# Patient Record
Sex: FEMALE | Race: WHITE | Employment: OTHER | URBAN - METROPOLITAN AREA
[De-identification: names, ages, dates, MRNs, and addresses within clinical notes are randomized per-mention and may not be internally consistent; named-entity substitution may affect disease eponyms.]

---

## 2017-01-25 ENCOUNTER — HOSPITAL ENCOUNTER (OUTPATIENT)
Dept: LAB | Facility: HOSPITAL | Age: 72
Discharge: HOME | End: 2017-01-25
Attending: INTERNAL MEDICINE
Payer: MEDICARE

## 2017-01-25 DIAGNOSIS — I48.0 PAROXYSMAL ATRIAL FIBRILLATION (HCC): ICD-10-CM

## 2017-01-25 DIAGNOSIS — I48.92 ATRIAL FLUTTER (HCC): ICD-10-CM

## 2017-01-25 LAB — POC INR: 2.3 (ref 0.8–1.3)

## 2017-01-25 PROCEDURE — 85610 PROTHROMBIN TIME: CPT

## 2017-02-08 ENCOUNTER — PRIOR ORIGINAL RECORDS (OUTPATIENT)
Dept: OTHER | Age: 72
End: 2017-02-08

## 2017-02-08 PROCEDURE — 84439 ASSAY OF FREE THYROXINE: CPT | Performed by: INTERNAL MEDICINE

## 2017-02-08 PROCEDURE — 84443 ASSAY THYROID STIM HORMONE: CPT | Performed by: INTERNAL MEDICINE

## 2017-02-08 PROCEDURE — 36415 COLL VENOUS BLD VENIPUNCTURE: CPT | Performed by: INTERNAL MEDICINE

## 2017-02-22 ENCOUNTER — HOSPITAL ENCOUNTER (OUTPATIENT)
Dept: LAB | Facility: HOSPITAL | Age: 72
Discharge: HOME | End: 2017-02-22
Attending: INTERNAL MEDICINE
Payer: MEDICARE

## 2017-02-22 DIAGNOSIS — I48.0 PAROXYSMAL ATRIAL FIBRILLATION (HCC): ICD-10-CM

## 2017-02-22 DIAGNOSIS — I48.92 ATRIAL FLUTTER (HCC): ICD-10-CM

## 2017-02-22 LAB — POC INR: 2.6 (ref 0.8–1.3)

## 2017-02-22 PROCEDURE — 85610 PROTHROMBIN TIME: CPT

## 2017-02-27 ENCOUNTER — PRIOR ORIGINAL RECORDS (OUTPATIENT)
Dept: OTHER | Age: 72
End: 2017-02-27

## 2017-03-02 LAB
FREE T4: 1.3 MG/DL
THYROID STIMULATING HORMONE: 2.06 MLU/L

## 2017-03-22 ENCOUNTER — PRIOR ORIGINAL RECORDS (OUTPATIENT)
Dept: OTHER | Age: 72
End: 2017-03-22

## 2017-03-22 ENCOUNTER — HOSPITAL ENCOUNTER (OUTPATIENT)
Dept: LAB | Facility: HOSPITAL | Age: 72
Discharge: HOME | End: 2017-03-22
Attending: INTERNAL MEDICINE
Payer: MEDICARE

## 2017-03-22 DIAGNOSIS — I48.0 PAROXYSMAL ATRIAL FIBRILLATION (HCC): ICD-10-CM

## 2017-03-22 DIAGNOSIS — I48.92 ATRIAL FLUTTER (HCC): ICD-10-CM

## 2017-03-22 LAB
ALT SERPL-CCNC: 20 U/L (ref 14–54)
AST SERPL-CCNC: 18 U/L (ref 15–41)
CHOLEST SMN-MCNC: 182 MG/DL (ref ?–200)
HDLC SERPL-MCNC: 90 MG/DL (ref 45–?)
HDLC SERPL: 2.02 {RATIO} (ref ?–4.44)
LDLC SERPL CALC-MCNC: 70 MG/DL (ref ?–130)
NONHDLC SERPL-MCNC: 92 MG/DL (ref ?–130)
POC INR: 2.7 (ref 0.8–1.3)
TRIGLYCERIDES: 108 MG/DL (ref ?–150)
VLDL: 22 MG/DL (ref 5–40)

## 2017-03-22 PROCEDURE — 85610 PROTHROMBIN TIME: CPT

## 2017-03-22 PROCEDURE — 80061 LIPID PANEL: CPT | Performed by: INTERNAL MEDICINE

## 2017-03-22 PROCEDURE — 36415 COLL VENOUS BLD VENIPUNCTURE: CPT | Performed by: INTERNAL MEDICINE

## 2017-03-22 PROCEDURE — 84450 TRANSFERASE (AST) (SGOT): CPT | Performed by: INTERNAL MEDICINE

## 2017-03-22 PROCEDURE — 84460 ALANINE AMINO (ALT) (SGPT): CPT | Performed by: INTERNAL MEDICINE

## 2017-03-28 LAB
ALT (SGPT): 20 U/L
AST (SGOT): 18 U/L
CHOLESTEROL, TOTAL: 182 MG/DL
HDL CHOLESTEROL: 90 MG/DL
LDL CHOLESTEROL: 70 MG/DL
TRIGLYCERIDES: 108 MG/DL

## 2017-04-18 ENCOUNTER — PRIOR ORIGINAL RECORDS (OUTPATIENT)
Dept: OTHER | Age: 72
End: 2017-04-18

## 2017-04-19 ENCOUNTER — HOSPITAL ENCOUNTER (OUTPATIENT)
Dept: LAB | Facility: HOSPITAL | Age: 72
Discharge: HOME | End: 2017-04-19
Attending: INTERNAL MEDICINE
Payer: MEDICARE

## 2017-04-19 DIAGNOSIS — I48.92 ATRIAL FLUTTER (HCC): ICD-10-CM

## 2017-04-19 DIAGNOSIS — I48.0 PAROXYSMAL ATRIAL FIBRILLATION (HCC): ICD-10-CM

## 2017-04-19 PROCEDURE — 85610 PROTHROMBIN TIME: CPT

## 2017-04-26 ENCOUNTER — OFFICE VISIT (OUTPATIENT)
Dept: HEMATOLOGY/ONCOLOGY | Facility: HOSPITAL | Age: 72
End: 2017-04-26
Attending: INTERNAL MEDICINE
Payer: MEDICARE

## 2017-04-26 VITALS
TEMPERATURE: 98 F | SYSTOLIC BLOOD PRESSURE: 104 MMHG | OXYGEN SATURATION: 98 % | HEART RATE: 64 BPM | DIASTOLIC BLOOD PRESSURE: 50 MMHG | BODY MASS INDEX: 26.53 KG/M2 | HEIGHT: 64.02 IN | RESPIRATION RATE: 18 BRPM | WEIGHT: 155.38 LBS

## 2017-04-26 DIAGNOSIS — M85.80 OSTEOPENIA, UNSPECIFIED LOCATION: ICD-10-CM

## 2017-04-26 DIAGNOSIS — C50.912 MALIGNANT NEOPLASM OF LEFT FEMALE BREAST, UNSPECIFIED SITE OF BREAST: Primary | ICD-10-CM

## 2017-04-26 PROCEDURE — 99213 OFFICE O/P EST LOW 20 MIN: CPT | Performed by: INTERNAL MEDICINE

## 2017-04-26 NOTE — PROGRESS NOTES
Pt here for 6 month MD f/u for hx of breast ca. Continues on anastrozole daily and tolerating well. Pt states she is feeling good and denies any complaints at this time.      Education Record    Learner:  Patient    Disease / Diagnosis: breast ca    Shay Sutherland

## 2017-04-26 NOTE — PROGRESS NOTES
[facility] Progress Note      Patient Name:  Maria C Peters  YOB: 1945  Medical Record Number:  [id number]    Date of visit: 4/26/2017    CHIEF COMPLAINT: Stage IIA left breast carcinoma, status post lumpectomy and radiation.     HPI total) by mouth daily. Disp: 3 tablet Rfl: 0   simvastatin (ZOCOR) 40 MG Oral Tab Take 40 mg by mouth nightly. Disp:  Rfl:    Vitamin B-12 (VITAMIN B12) 500 MCG Oral Tab Take 500 mcg by mouth daily.  Disp:  Rfl:    Metoprolol Tartrate (LOPRESSOR) 25 MG Oral GIGI Azevedo  [address], [place], [address]    4/26/2017

## 2017-05-17 ENCOUNTER — HOSPITAL ENCOUNTER (OUTPATIENT)
Dept: LAB | Facility: HOSPITAL | Age: 72
Discharge: HOME | End: 2017-05-17
Attending: INTERNAL MEDICINE
Payer: MEDICARE

## 2017-05-17 DIAGNOSIS — I48.0 PAROXYSMAL ATRIAL FIBRILLATION (HCC): ICD-10-CM

## 2017-05-17 PROCEDURE — 85610 PROTHROMBIN TIME: CPT

## 2017-05-31 ENCOUNTER — SNF/IP PROF CHARGE ONLY (OUTPATIENT)
Dept: HEMATOLOGY/ONCOLOGY | Facility: HOSPITAL | Age: 72
End: 2017-05-31

## 2017-05-31 DIAGNOSIS — C50.912 MALIGNANT NEOPLASM OF LEFT BREAST IN FEMALE, ESTROGEN RECEPTOR POSITIVE, UNSPECIFIED SITE OF BREAST (HCC): ICD-10-CM

## 2017-05-31 DIAGNOSIS — Z17.0 MALIGNANT NEOPLASM OF LEFT BREAST IN FEMALE, ESTROGEN RECEPTOR POSITIVE, UNSPECIFIED SITE OF BREAST (HCC): ICD-10-CM

## 2017-05-31 PROCEDURE — G9678 ONCOLOGY CARE MODEL SERVICE: HCPCS | Performed by: INTERNAL MEDICINE

## 2017-06-05 ENCOUNTER — HOSPITAL ENCOUNTER (EMERGENCY)
Facility: HOSPITAL | Age: 72
Discharge: HOME | End: 2017-06-06
Attending: EMERGENCY MEDICINE
Payer: MEDICARE

## 2017-06-05 DIAGNOSIS — R04.0 EPISTAXIS: Primary | ICD-10-CM

## 2017-06-05 PROCEDURE — 36415 COLL VENOUS BLD VENIPUNCTURE: CPT

## 2017-06-05 PROCEDURE — 85610 PROTHROMBIN TIME: CPT | Performed by: EMERGENCY MEDICINE

## 2017-06-05 PROCEDURE — 99283 EMERGENCY DEPT VISIT LOW MDM: CPT

## 2017-06-05 PROCEDURE — 85025 COMPLETE CBC W/AUTO DIFF WBC: CPT | Performed by: EMERGENCY MEDICINE

## 2017-06-05 PROCEDURE — 80053 COMPREHEN METABOLIC PANEL: CPT | Performed by: EMERGENCY MEDICINE

## 2017-06-06 VITALS
SYSTOLIC BLOOD PRESSURE: 149 MMHG | BODY MASS INDEX: 26.46 KG/M2 | DIASTOLIC BLOOD PRESSURE: 84 MMHG | RESPIRATION RATE: 18 BRPM | OXYGEN SATURATION: 96 % | TEMPERATURE: 99 F | HEART RATE: 75 BPM | WEIGHT: 155 LBS | HEIGHT: 64 IN

## 2017-06-06 NOTE — ED PROVIDER NOTES
Patient Seen in: [facility] Emergency Department    History   Patient presents with:  Nose Bleed (nasopharyngeal)    Stated Complaint: nose bleed    HPI    The patient is a 71-year-old female with a long history of atrial fibrillation who is been in Cap,  Take 1 capsule (5 mg total) by mouth daily.    Warfarin Sodium (COUMADIN) 5 MG Oral Tab,  Takes 2.5 mg on tues, thurs, Saturday and 5 mg mon, wed, fri, sun  Patient taking differently: Takes 2.5 mg on tues, thurs, Saturday and Sunday and 5 mg mon, wed symmetric motor strength and sensation. HEENT: No lymphadenopathy, TMs nml. Oropharynx nml. No blood in the retro-oropharynx. No active bleeding. There is a small amount of dried blood in her right nostril. No source of bleeding.   Left nostril is tariq On arrival the patient a mechanical nasal clip was placed. She was observed in the emergency department for a couple of hours. Blood was obtained and peripheral IV access was established. MDM     She has not had any further rebleeding.   She has been

## 2017-06-14 ENCOUNTER — HOSPITAL ENCOUNTER (OUTPATIENT)
Dept: LAB | Facility: HOSPITAL | Age: 72
Discharge: HOME | End: 2017-06-14
Attending: INTERNAL MEDICINE
Payer: MEDICARE

## 2017-06-14 DIAGNOSIS — I48.0 PAROXYSMAL ATRIAL FIBRILLATION (HCC): ICD-10-CM

## 2017-06-14 PROCEDURE — 85610 PROTHROMBIN TIME: CPT

## 2017-06-30 ENCOUNTER — SNF/IP PROF CHARGE ONLY (OUTPATIENT)
Dept: HEMATOLOGY/ONCOLOGY | Facility: HOSPITAL | Age: 72
End: 2017-06-30

## 2017-06-30 DIAGNOSIS — Z17.0 MALIGNANT NEOPLASM OF LEFT BREAST IN FEMALE, ESTROGEN RECEPTOR POSITIVE, UNSPECIFIED SITE OF BREAST (HCC): ICD-10-CM

## 2017-06-30 DIAGNOSIS — C50.912 MALIGNANT NEOPLASM OF LEFT BREAST IN FEMALE, ESTROGEN RECEPTOR POSITIVE, UNSPECIFIED SITE OF BREAST (HCC): ICD-10-CM

## 2017-06-30 PROCEDURE — G9678 ONCOLOGY CARE MODEL SERVICE: HCPCS | Performed by: INTERNAL MEDICINE

## 2017-07-05 RX ORDER — ANASTROZOLE 1 MG/1
TABLET ORAL
Qty: 90 TABLET | Refills: 2 | OUTPATIENT
Start: 2017-07-05

## 2017-07-06 DIAGNOSIS — C50.919 BREAST CANCER (HCC): Primary | ICD-10-CM

## 2017-07-06 RX ORDER — ANASTROZOLE 1 MG/1
TABLET ORAL
Qty: 90 TABLET | Refills: 2 | OUTPATIENT
Start: 2017-07-06

## 2017-07-07 RX ORDER — ANASTROZOLE 1 MG/1
1 TABLET ORAL
Qty: 90 TABLET | Refills: 3 | Status: SHIPPED | OUTPATIENT
Start: 2017-07-07 | End: 2018-06-18

## 2017-07-18 ENCOUNTER — HOSPITAL ENCOUNTER (OUTPATIENT)
Dept: LAB | Facility: HOSPITAL | Age: 72
Discharge: HOME | End: 2017-07-18
Attending: INTERNAL MEDICINE
Payer: MEDICARE

## 2017-07-18 DIAGNOSIS — I48.0 PAROXYSMAL ATRIAL FIBRILLATION (HCC): ICD-10-CM

## 2017-07-18 LAB — POC INR: 3.5 (ref 0.8–1.3)

## 2017-07-18 PROCEDURE — 85610 PROTHROMBIN TIME: CPT

## 2017-07-31 ENCOUNTER — SNF/IP PROF CHARGE ONLY (OUTPATIENT)
Dept: HEMATOLOGY/ONCOLOGY | Facility: HOSPITAL | Age: 72
End: 2017-07-31

## 2017-07-31 DIAGNOSIS — Z17.0 MALIGNANT NEOPLASM OF LEFT BREAST IN FEMALE, ESTROGEN RECEPTOR POSITIVE, UNSPECIFIED SITE OF BREAST (HCC): ICD-10-CM

## 2017-07-31 DIAGNOSIS — C50.912 MALIGNANT NEOPLASM OF LEFT BREAST IN FEMALE, ESTROGEN RECEPTOR POSITIVE, UNSPECIFIED SITE OF BREAST (HCC): ICD-10-CM

## 2017-07-31 PROCEDURE — G9678 ONCOLOGY CARE MODEL SERVICE: HCPCS | Performed by: INTERNAL MEDICINE

## 2017-08-02 ENCOUNTER — HOSPITAL ENCOUNTER (OUTPATIENT)
Dept: LAB | Facility: HOSPITAL | Age: 72
Discharge: HOME | End: 2017-08-02
Attending: INTERNAL MEDICINE
Payer: MEDICARE

## 2017-08-02 ENCOUNTER — PRIOR ORIGINAL RECORDS (OUTPATIENT)
Dept: OTHER | Age: 72
End: 2017-08-02

## 2017-08-02 DIAGNOSIS — Z51.81 MEDICATION MONITORING ENCOUNTER: ICD-10-CM

## 2017-08-02 DIAGNOSIS — I48.0 PAROXYSMAL ATRIAL FIBRILLATION (HCC): ICD-10-CM

## 2017-08-02 LAB
POC INR: 2.7 (ref 0.8–1.3)
TSI SER-ACNC: 1.58 MIU/ML (ref 0.35–5.5)

## 2017-08-02 PROCEDURE — 85610 PROTHROMBIN TIME: CPT

## 2017-08-02 PROCEDURE — 36415 COLL VENOUS BLD VENIPUNCTURE: CPT

## 2017-08-02 PROCEDURE — 84443 ASSAY THYROID STIM HORMONE: CPT

## 2017-08-16 LAB — THYROID STIMULATING HORMONE: 1.58 MLU/L

## 2017-08-30 ENCOUNTER — HOSPITAL ENCOUNTER (OUTPATIENT)
Dept: LAB | Facility: HOSPITAL | Age: 72
Discharge: HOME | End: 2017-08-30
Attending: INTERNAL MEDICINE
Payer: MEDICARE

## 2017-08-30 DIAGNOSIS — I48.0 PAROXYSMAL ATRIAL FIBRILLATION (HCC): ICD-10-CM

## 2017-08-30 LAB — POC INR: 3.3 (ref 0.8–1.3)

## 2017-08-30 PROCEDURE — 85610 PROTHROMBIN TIME: CPT

## 2017-08-31 ENCOUNTER — SNF/IP PROF CHARGE ONLY (OUTPATIENT)
Dept: HEMATOLOGY/ONCOLOGY | Facility: HOSPITAL | Age: 72
End: 2017-08-31

## 2017-08-31 DIAGNOSIS — Z17.0 MALIGNANT NEOPLASM OF LEFT BREAST IN FEMALE, ESTROGEN RECEPTOR POSITIVE, UNSPECIFIED SITE OF BREAST (HCC): ICD-10-CM

## 2017-08-31 DIAGNOSIS — C50.912 MALIGNANT NEOPLASM OF LEFT BREAST IN FEMALE, ESTROGEN RECEPTOR POSITIVE, UNSPECIFIED SITE OF BREAST (HCC): ICD-10-CM

## 2017-08-31 PROCEDURE — G9678 ONCOLOGY CARE MODEL SERVICE: HCPCS | Performed by: INTERNAL MEDICINE

## 2017-09-12 ENCOUNTER — EMPLOYEE HEALTH (OUTPATIENT)
Dept: OTHER | Facility: HOSPITAL | Age: 72
End: 2017-09-12
Attending: PREVENTIVE MEDICINE

## 2017-09-12 ENCOUNTER — CHARTING TRANS (OUTPATIENT)
Dept: OTHER | Age: 72
End: 2017-09-12

## 2017-09-12 DIAGNOSIS — Z11.1 SCREENING-PULMONARY TB: Primary | ICD-10-CM

## 2017-09-12 PROCEDURE — 86480 TB TEST CELL IMMUN MEASURE: CPT

## 2017-09-14 LAB
M TB TUBERC IFN-G BLD QL: NEGATIVE
M TB TUBERC IFN-G/MITOGEN IGNF BLD: 0 IU/ML
M TB TUBERC IGNF/MITOGEN IGNF CONTROL: >10 IU/ML
MITOGEN IGNF BCKGRD COR BLD-ACNC: 0.04 IU/ML

## 2017-09-20 ENCOUNTER — HOSPITAL ENCOUNTER (OUTPATIENT)
Dept: LAB | Facility: HOSPITAL | Age: 72
Discharge: HOME | End: 2017-09-20
Attending: INTERNAL MEDICINE
Payer: MEDICARE

## 2017-09-20 DIAGNOSIS — I48.0 PAROXYSMAL ATRIAL FIBRILLATION (HCC): ICD-10-CM

## 2017-09-20 LAB — POC INR: 2.8 (ref 0.8–1.3)

## 2017-09-20 PROCEDURE — 85610 PROTHROMBIN TIME: CPT

## 2017-09-30 ENCOUNTER — SNF/IP PROF CHARGE ONLY (OUTPATIENT)
Dept: HEMATOLOGY/ONCOLOGY | Facility: HOSPITAL | Age: 72
End: 2017-09-30

## 2017-09-30 DIAGNOSIS — Z17.0 MALIGNANT NEOPLASM OF LEFT BREAST IN FEMALE, ESTROGEN RECEPTOR POSITIVE, UNSPECIFIED SITE OF BREAST (HCC): ICD-10-CM

## 2017-09-30 DIAGNOSIS — C50.912 MALIGNANT NEOPLASM OF LEFT BREAST IN FEMALE, ESTROGEN RECEPTOR POSITIVE, UNSPECIFIED SITE OF BREAST (HCC): ICD-10-CM

## 2017-09-30 PROCEDURE — G9678 ONCOLOGY CARE MODEL SERVICE: HCPCS | Performed by: INTERNAL MEDICINE

## 2017-10-17 ENCOUNTER — HOSPITAL ENCOUNTER (OUTPATIENT)
Dept: LAB | Facility: HOSPITAL | Age: 72
Discharge: HOME | End: 2017-10-17
Attending: INTERNAL MEDICINE
Payer: MEDICARE

## 2017-10-17 DIAGNOSIS — I48.0 PAROXYSMAL ATRIAL FIBRILLATION (HCC): ICD-10-CM

## 2017-10-17 PROCEDURE — 85610 PROTHROMBIN TIME: CPT

## 2017-10-18 ENCOUNTER — HOSPITAL ENCOUNTER (OUTPATIENT)
Dept: MAMMOGRAPHY | Facility: HOSPITAL | Age: 72
Discharge: HOME | End: 2017-10-18
Attending: INTERNAL MEDICINE
Payer: MEDICARE

## 2017-10-18 DIAGNOSIS — C50.912 MALIGNANT NEOPLASM OF LEFT FEMALE BREAST (HCC): ICD-10-CM

## 2017-10-18 PROCEDURE — 77062 BREAST TOMOSYNTHESIS BI: CPT | Performed by: INTERNAL MEDICINE

## 2017-10-18 PROCEDURE — 77066 DX MAMMO INCL CAD BI: CPT | Performed by: INTERNAL MEDICINE

## 2017-10-25 ENCOUNTER — HOSPITAL ENCOUNTER (OUTPATIENT)
Dept: LAB | Facility: HOSPITAL | Age: 72
Discharge: HOME | End: 2017-10-25
Attending: INTERNAL MEDICINE
Payer: MEDICARE

## 2017-10-25 ENCOUNTER — PRIOR ORIGINAL RECORDS (OUTPATIENT)
Dept: OTHER | Age: 72
End: 2017-10-25

## 2017-10-25 PROCEDURE — 36415 COLL VENOUS BLD VENIPUNCTURE: CPT | Performed by: INTERNAL MEDICINE

## 2017-10-25 PROCEDURE — 84450 TRANSFERASE (AST) (SGOT): CPT | Performed by: INTERNAL MEDICINE

## 2017-10-25 PROCEDURE — 84460 ALANINE AMINO (ALT) (SGPT): CPT | Performed by: INTERNAL MEDICINE

## 2017-10-25 PROCEDURE — 82947 ASSAY GLUCOSE BLOOD QUANT: CPT | Performed by: INTERNAL MEDICINE

## 2017-10-25 PROCEDURE — 80061 LIPID PANEL: CPT | Performed by: INTERNAL MEDICINE

## 2017-10-31 ENCOUNTER — SNF/IP PROF CHARGE ONLY (OUTPATIENT)
Dept: HEMATOLOGY/ONCOLOGY | Facility: HOSPITAL | Age: 72
End: 2017-10-31

## 2017-10-31 DIAGNOSIS — Z17.0 MALIGNANT NEOPLASM OF LEFT BREAST IN FEMALE, ESTROGEN RECEPTOR POSITIVE, UNSPECIFIED SITE OF BREAST (HCC): ICD-10-CM

## 2017-10-31 DIAGNOSIS — C50.912 MALIGNANT NEOPLASM OF LEFT BREAST IN FEMALE, ESTROGEN RECEPTOR POSITIVE, UNSPECIFIED SITE OF BREAST (HCC): ICD-10-CM

## 2017-10-31 PROCEDURE — G9678 ONCOLOGY CARE MODEL SERVICE: HCPCS | Performed by: INTERNAL MEDICINE

## 2017-11-01 ENCOUNTER — OFFICE VISIT (OUTPATIENT)
Dept: HEMATOLOGY/ONCOLOGY | Facility: HOSPITAL | Age: 72
End: 2017-11-01
Attending: INTERNAL MEDICINE
Payer: MEDICARE

## 2017-11-01 VITALS
BODY MASS INDEX: 26.21 KG/M2 | HEIGHT: 64.02 IN | DIASTOLIC BLOOD PRESSURE: 63 MMHG | HEART RATE: 67 BPM | SYSTOLIC BLOOD PRESSURE: 118 MMHG | WEIGHT: 153.5 LBS | RESPIRATION RATE: 16 BRPM | TEMPERATURE: 97 F | OXYGEN SATURATION: 95 %

## 2017-11-01 DIAGNOSIS — M85.80 OSTEOPENIA, UNSPECIFIED LOCATION: ICD-10-CM

## 2017-11-01 DIAGNOSIS — I89.0 LYMPHEDEMA: ICD-10-CM

## 2017-11-01 DIAGNOSIS — Z17.0 MALIGNANT NEOPLASM OF LEFT BREAST IN FEMALE, ESTROGEN RECEPTOR POSITIVE, UNSPECIFIED SITE OF BREAST (HCC): ICD-10-CM

## 2017-11-01 DIAGNOSIS — C50.912 MALIGNANT NEOPLASM OF LEFT BREAST IN FEMALE, ESTROGEN RECEPTOR POSITIVE, UNSPECIFIED SITE OF BREAST (HCC): ICD-10-CM

## 2017-11-01 DIAGNOSIS — M89.9 BONE DISORDER: Primary | ICD-10-CM

## 2017-11-01 PROCEDURE — 99213 OFFICE O/P EST LOW 20 MIN: CPT | Performed by: INTERNAL MEDICINE

## 2017-11-01 NOTE — PROGRESS NOTES
[facility] Progress Note      Patient Name:  Mayi Vance  YOB: 1945  Medical Record Number:  [id number]    Date of visit: 11/1/2017    CHIEF COMPLAINT: Stage IIA left breast carcinoma, status post lumpectomy and radiation.     HPI MG Oral Tab Take 1 tablet (100 mg total) by mouth daily. Disp: 3 tablet Rfl: 0   simvastatin (ZOCOR) 40 MG Oral Tab Take 40 mg by mouth nightly. Disp:  Rfl:    Vitamin B-12 (VITAMIN B12) 500 MCG Oral Tab Take 500 mcg by mouth daily.  Disp:  Rfl:    Metoprol showed osteopenia iwith T score -1.5. Consider repeating in 2 years. # Left breast fullness: tethering noted left breast. Mild lymphedema left ant axillary fold-appears stable. No mass. Watch for now and consider lymphedema Rx if worse.     ORDERS JACI

## 2017-11-01 NOTE — PROGRESS NOTES
Pt here with spouse for 6 month MD f/u for hx of breast ca. Continues on anastrozole daily; denies complaints.      Education Record    Learner:  Patient    Disease / Diagnosis:    Barriers / Limitations:  None   Comments:    Method:  Discussion   Comments:

## 2017-11-10 LAB
ALT (SGPT): 22 U/L
AST (SGOT): 17 U/L
CHOLESTEROL, TOTAL: 168 MG/DL
GLUCOSE: 96 MG/DL
HDL CHOLESTEROL: 91 MG/DL
LDL CHOLESTEROL: 52 MG/DL
TRIGLYCERIDES: 124 MG/DL

## 2017-11-14 ENCOUNTER — PRIOR ORIGINAL RECORDS (OUTPATIENT)
Dept: OTHER | Age: 72
End: 2017-11-14

## 2017-11-14 ENCOUNTER — HOSPITAL ENCOUNTER (OUTPATIENT)
Dept: LAB | Facility: HOSPITAL | Age: 72
Discharge: HOME | End: 2017-11-14
Attending: INTERNAL MEDICINE
Payer: MEDICARE

## 2017-11-14 DIAGNOSIS — I48.0 PAROXYSMAL ATRIAL FIBRILLATION (HCC): ICD-10-CM

## 2017-11-14 PROCEDURE — 85610 PROTHROMBIN TIME: CPT

## 2017-11-24 ENCOUNTER — PRIOR ORIGINAL RECORDS (OUTPATIENT)
Dept: OTHER | Age: 72
End: 2017-11-24

## 2017-12-12 ENCOUNTER — HOSPITAL ENCOUNTER (OUTPATIENT)
Dept: LAB | Facility: HOSPITAL | Age: 72
Discharge: HOME | End: 2017-12-12
Attending: INTERNAL MEDICINE
Payer: MEDICARE

## 2017-12-12 DIAGNOSIS — I48.0 PAROXYSMAL ATRIAL FIBRILLATION (HCC): ICD-10-CM

## 2017-12-12 PROCEDURE — 85610 PROTHROMBIN TIME: CPT

## 2018-01-10 ENCOUNTER — HOSPITAL ENCOUNTER (OUTPATIENT)
Dept: LAB | Facility: HOSPITAL | Age: 73
Discharge: HOME | End: 2018-01-10
Attending: INTERNAL MEDICINE
Payer: MEDICARE

## 2018-01-10 DIAGNOSIS — I48.0 PAROXYSMAL ATRIAL FIBRILLATION (HCC): ICD-10-CM

## 2018-01-10 LAB — POC INR: 2.3 (ref 0.8–1.3)

## 2018-01-10 PROCEDURE — 85610 PROTHROMBIN TIME: CPT

## 2018-02-14 ENCOUNTER — PRIOR ORIGINAL RECORDS (OUTPATIENT)
Dept: OTHER | Age: 73
End: 2018-02-14

## 2018-02-14 ENCOUNTER — HOSPITAL ENCOUNTER (OUTPATIENT)
Dept: LAB | Facility: HOSPITAL | Age: 73
Discharge: HOME | End: 2018-02-14
Attending: INTERNAL MEDICINE
Payer: MEDICARE

## 2018-02-14 DIAGNOSIS — T46.2X5A HYPERTHYROIDISM SECONDARY TO AMIODARONE: ICD-10-CM

## 2018-02-14 DIAGNOSIS — E05.80 HYPERTHYROIDISM SECONDARY TO AMIODARONE: ICD-10-CM

## 2018-02-14 DIAGNOSIS — Z79.899 MEDICATION MANAGEMENT: ICD-10-CM

## 2018-02-14 DIAGNOSIS — I48.0 PAROXYSMAL ATRIAL FIBRILLATION (HCC): ICD-10-CM

## 2018-02-14 LAB
FREE T4: 1.3 NG/DL (ref 0.9–1.8)
POC INR: 2.4 (ref 0.8–1.3)
TSI SER-ACNC: 2.58 MIU/ML (ref 0.35–5.5)

## 2018-02-14 PROCEDURE — 36415 COLL VENOUS BLD VENIPUNCTURE: CPT

## 2018-02-14 PROCEDURE — 84439 ASSAY OF FREE THYROXINE: CPT

## 2018-02-14 PROCEDURE — 84443 ASSAY THYROID STIM HORMONE: CPT

## 2018-02-14 PROCEDURE — 85610 PROTHROMBIN TIME: CPT

## 2018-03-14 ENCOUNTER — HOSPITAL ENCOUNTER (OUTPATIENT)
Dept: LAB | Facility: HOSPITAL | Age: 73
Discharge: HOME | End: 2018-03-14
Attending: INTERNAL MEDICINE
Payer: MEDICARE

## 2018-03-14 DIAGNOSIS — I48.0 PAROXYSMAL ATRIAL FIBRILLATION (HCC): ICD-10-CM

## 2018-03-14 LAB — POC INR: 2.9 (ref 0.8–1.3)

## 2018-03-14 PROCEDURE — 85610 PROTHROMBIN TIME: CPT

## 2018-04-11 ENCOUNTER — HOSPITAL ENCOUNTER (OUTPATIENT)
Dept: LAB | Facility: HOSPITAL | Age: 73
Discharge: HOME | End: 2018-04-11
Attending: INTERNAL MEDICINE
Payer: MEDICARE

## 2018-04-11 ENCOUNTER — TELEPHONE (OUTPATIENT)
Dept: HEMATOLOGY/ONCOLOGY | Facility: HOSPITAL | Age: 73
End: 2018-04-11

## 2018-04-11 DIAGNOSIS — I48.0 PAROXYSMAL ATRIAL FIBRILLATION (HCC): ICD-10-CM

## 2018-04-11 DIAGNOSIS — M89.9 BONE DISORDER: ICD-10-CM

## 2018-04-11 PROCEDURE — 85610 PROTHROMBIN TIME: CPT

## 2018-04-11 PROCEDURE — 36415 COLL VENOUS BLD VENIPUNCTURE: CPT | Performed by: INTERNAL MEDICINE

## 2018-04-11 PROCEDURE — 82306 VITAMIN D 25 HYDROXY: CPT | Performed by: INTERNAL MEDICINE

## 2018-04-11 NOTE — TELEPHONE ENCOUNTER
Bhumi Woodruff MD  [facility] [address] [facility]             Call, Vit D ok, if she is on OTC, may continue      Spoke to  and left message with him.

## 2018-05-02 ENCOUNTER — OFFICE VISIT (OUTPATIENT)
Dept: HEMATOLOGY/ONCOLOGY | Facility: HOSPITAL | Age: 73
End: 2018-05-02
Attending: INTERNAL MEDICINE
Payer: MEDICARE

## 2018-05-02 VITALS
TEMPERATURE: 98 F | HEIGHT: 64.02 IN | WEIGHT: 153.19 LBS | BODY MASS INDEX: 26.15 KG/M2 | HEART RATE: 79 BPM | SYSTOLIC BLOOD PRESSURE: 123 MMHG | RESPIRATION RATE: 18 BRPM | DIASTOLIC BLOOD PRESSURE: 73 MMHG | OXYGEN SATURATION: 97 %

## 2018-05-02 DIAGNOSIS — C50.912 MALIGNANT NEOPLASM OF LEFT BREAST IN FEMALE, ESTROGEN RECEPTOR POSITIVE, UNSPECIFIED SITE OF BREAST (HCC): Primary | ICD-10-CM

## 2018-05-02 DIAGNOSIS — M85.88 OSTEOPENIA OF SPINE: ICD-10-CM

## 2018-05-02 DIAGNOSIS — Z17.0 MALIGNANT NEOPLASM OF LEFT BREAST IN FEMALE, ESTROGEN RECEPTOR POSITIVE, UNSPECIFIED SITE OF BREAST (HCC): Primary | ICD-10-CM

## 2018-05-02 PROCEDURE — 99213 OFFICE O/P EST LOW 20 MIN: CPT | Performed by: INTERNAL MEDICINE

## 2018-05-02 NOTE — PROGRESS NOTES
[facility] Progress Note      Patient Name:  Maria C Peters  YOB: 1945  Medical Record Number:  [id number]    Date of visit: 5/2/2018    CHIEF COMPLAINT: Stage IIA left breast carcinoma, status post lumpectomy and radiation.     HPI: 50 tablet Rfl: 11   Amiodarone HCl (PACERONE) 100 MG Oral Tab Take 1 tablet (100 mg total) by mouth daily. Disp: 3 tablet Rfl: 0   simvastatin (ZOCOR) 40 MG Oral Tab Take 40 mg by mouth nightly.  Disp:  Rfl:    Vitamin B-12 (VITAMIN B12) 500 MCG Oral Tab Ta positive lymph node. Discussed small benefit of extending endocrine Rx and she wishes to remain on it at least till 10/18 and will consider extending if DEXA is stable. # Osteopenia:  DEXA 10/16 showed osteopenia iwith T score -1.5. Repeat this fall.

## 2018-05-02 NOTE — PROGRESS NOTES
Pt here for 6 month MD f/u for h/o LB ca. Continues on anastrozole daily without complaint. Pt states she has been feeling well and denies any issues or concerns at this time.      Education Record    Learner:  Patient, spouse    Disease / Diagnosis:    Bar

## 2018-05-09 ENCOUNTER — HOSPITAL ENCOUNTER (OUTPATIENT)
Dept: LAB | Facility: HOSPITAL | Age: 73
Discharge: HOME | End: 2018-05-09
Attending: INTERNAL MEDICINE
Payer: MEDICARE

## 2018-05-09 ENCOUNTER — PRIOR ORIGINAL RECORDS (OUTPATIENT)
Dept: OTHER | Age: 73
End: 2018-05-09

## 2018-05-09 DIAGNOSIS — I48.0 PAROXYSMAL ATRIAL FIBRILLATION (HCC): ICD-10-CM

## 2018-05-09 PROCEDURE — 84450 TRANSFERASE (AST) (SGOT): CPT | Performed by: INTERNAL MEDICINE

## 2018-05-09 PROCEDURE — 80061 LIPID PANEL: CPT | Performed by: INTERNAL MEDICINE

## 2018-05-09 PROCEDURE — 85610 PROTHROMBIN TIME: CPT

## 2018-05-09 PROCEDURE — 84460 ALANINE AMINO (ALT) (SGPT): CPT | Performed by: INTERNAL MEDICINE

## 2018-05-09 PROCEDURE — 82947 ASSAY GLUCOSE BLOOD QUANT: CPT | Performed by: INTERNAL MEDICINE

## 2018-05-09 PROCEDURE — 36415 COLL VENOUS BLD VENIPUNCTURE: CPT | Performed by: INTERNAL MEDICINE

## 2018-05-17 LAB
ALT (SGPT): 20 U/L
AST (SGOT): 17 U/L
CHOLESTEROL, TOTAL: 166 MG/DL
GLUCOSE: 100 MG/DL
HDL CHOLESTEROL: 88 MG/DL
LDL CHOLESTEROL: 55 MG/DL
TRIGLYCERIDES: 113 MG/DL

## 2018-06-05 ENCOUNTER — PRIOR ORIGINAL RECORDS (OUTPATIENT)
Dept: OTHER | Age: 73
End: 2018-06-05

## 2018-06-06 ENCOUNTER — HOSPITAL ENCOUNTER (OUTPATIENT)
Dept: LAB | Facility: HOSPITAL | Age: 73
Discharge: HOME | End: 2018-06-06
Attending: INTERNAL MEDICINE
Payer: MEDICARE

## 2018-06-06 DIAGNOSIS — I48.92 ATRIAL FLUTTER (HCC): ICD-10-CM

## 2018-06-06 DIAGNOSIS — I48.0 PAROXYSMAL ATRIAL FIBRILLATION (HCC): ICD-10-CM

## 2018-06-06 PROCEDURE — 85610 PROTHROMBIN TIME: CPT

## 2018-06-15 ENCOUNTER — HOSPITAL ENCOUNTER (OUTPATIENT)
Dept: LAB | Facility: HOSPITAL | Age: 73
Discharge: HOME | End: 2018-06-15
Attending: INTERNAL MEDICINE
Payer: MEDICARE

## 2018-06-15 DIAGNOSIS — I48.92 ATRIAL FLUTTER (HCC): ICD-10-CM

## 2018-06-15 DIAGNOSIS — I48.0 PAROXYSMAL ATRIAL FIBRILLATION (HCC): ICD-10-CM

## 2018-06-15 PROCEDURE — 85610 PROTHROMBIN TIME: CPT

## 2018-06-18 DIAGNOSIS — C50.919 BREAST CANCER (HCC): ICD-10-CM

## 2018-06-19 RX ORDER — ANASTROZOLE 1 MG/1
TABLET ORAL
Qty: 90 TABLET | Refills: 2 | Status: SHIPPED | OUTPATIENT
Start: 2018-06-19 | End: 2019-03-05

## 2018-06-20 ENCOUNTER — HOSPITAL ENCOUNTER (OUTPATIENT)
Dept: LAB | Facility: HOSPITAL | Age: 73
Discharge: HOME | End: 2018-06-20
Attending: INTERNAL MEDICINE
Payer: MEDICARE

## 2018-06-20 DIAGNOSIS — I48.92 ATRIAL FLUTTER (HCC): ICD-10-CM

## 2018-06-20 DIAGNOSIS — I48.0 PAROXYSMAL ATRIAL FIBRILLATION (HCC): ICD-10-CM

## 2018-06-20 PROCEDURE — 85610 PROTHROMBIN TIME: CPT

## 2018-06-25 ENCOUNTER — HOSPITAL ENCOUNTER (OUTPATIENT)
Dept: LAB | Facility: HOSPITAL | Age: 73
Discharge: HOME | End: 2018-06-25
Attending: INTERNAL MEDICINE
Payer: MEDICARE

## 2018-06-25 DIAGNOSIS — I48.92 ATRIAL FLUTTER (HCC): ICD-10-CM

## 2018-06-25 DIAGNOSIS — I48.0 PAROXYSMAL ATRIAL FIBRILLATION (HCC): ICD-10-CM

## 2018-06-25 PROCEDURE — 85610 PROTHROMBIN TIME: CPT

## 2018-06-30 ENCOUNTER — SNF/IP PROF CHARGE ONLY (OUTPATIENT)
Dept: HEMATOLOGY/ONCOLOGY | Facility: HOSPITAL | Age: 73
End: 2018-06-30

## 2018-06-30 DIAGNOSIS — C50.912 MALIGNANT NEOPLASM OF LEFT BREAST IN FEMALE, ESTROGEN RECEPTOR POSITIVE, UNSPECIFIED SITE OF BREAST (HCC): ICD-10-CM

## 2018-06-30 DIAGNOSIS — Z17.0 MALIGNANT NEOPLASM OF LEFT BREAST IN FEMALE, ESTROGEN RECEPTOR POSITIVE, UNSPECIFIED SITE OF BREAST (HCC): ICD-10-CM

## 2018-06-30 PROCEDURE — G9678 ONCOLOGY CARE MODEL SERVICE: HCPCS | Performed by: INTERNAL MEDICINE

## 2018-07-05 ENCOUNTER — HOSPITAL ENCOUNTER (OUTPATIENT)
Dept: LAB | Facility: HOSPITAL | Age: 73
Discharge: HOME | End: 2018-07-05
Attending: INTERNAL MEDICINE
Payer: MEDICARE

## 2018-07-05 DIAGNOSIS — I48.92 ATRIAL FLUTTER (HCC): ICD-10-CM

## 2018-07-05 DIAGNOSIS — I48.0 PAROXYSMAL ATRIAL FIBRILLATION (HCC): ICD-10-CM

## 2018-07-05 LAB — POC INR: 2.4 (ref 0.8–1.3)

## 2018-07-05 PROCEDURE — 85610 PROTHROMBIN TIME: CPT

## 2018-07-17 ENCOUNTER — PRIOR ORIGINAL RECORDS (OUTPATIENT)
Dept: OTHER | Age: 73
End: 2018-07-17

## 2018-07-20 ENCOUNTER — HOSPITAL ENCOUNTER (OUTPATIENT)
Dept: LAB | Facility: HOSPITAL | Age: 73
Discharge: HOME | End: 2018-07-20
Attending: INTERNAL MEDICINE
Payer: MEDICARE

## 2018-07-20 DIAGNOSIS — I48.0 PAROXYSMAL ATRIAL FIBRILLATION (HCC): ICD-10-CM

## 2018-07-20 DIAGNOSIS — I48.92 ATRIAL FLUTTER (HCC): ICD-10-CM

## 2018-07-20 LAB — POC INR: 2.7 (ref 0.8–1.3)

## 2018-07-20 PROCEDURE — 85610 PROTHROMBIN TIME: CPT

## 2018-07-27 LAB
FREE T4: 1.3 MG/DL
THYROID STIMULATING HORMONE: 2.58 MLU/L

## 2018-07-31 ENCOUNTER — SNF/IP PROF CHARGE ONLY (OUTPATIENT)
Dept: HEMATOLOGY/ONCOLOGY | Facility: HOSPITAL | Age: 73
End: 2018-07-31

## 2018-07-31 DIAGNOSIS — Z17.0 MALIGNANT NEOPLASM OF LEFT BREAST IN FEMALE, ESTROGEN RECEPTOR POSITIVE, UNSPECIFIED SITE OF BREAST (HCC): ICD-10-CM

## 2018-07-31 DIAGNOSIS — C50.912 MALIGNANT NEOPLASM OF LEFT BREAST IN FEMALE, ESTROGEN RECEPTOR POSITIVE, UNSPECIFIED SITE OF BREAST (HCC): ICD-10-CM

## 2018-07-31 PROCEDURE — G9678 ONCOLOGY CARE MODEL SERVICE: HCPCS | Performed by: INTERNAL MEDICINE

## 2018-08-15 ENCOUNTER — APPOINTMENT (OUTPATIENT)
Dept: LAB | Facility: HOSPITAL | Age: 73
End: 2018-08-15
Attending: INTERNAL MEDICINE
Payer: MEDICARE

## 2018-08-15 ENCOUNTER — HOSPITAL ENCOUNTER (OUTPATIENT)
Dept: LAB | Facility: HOSPITAL | Age: 73
Discharge: HOME | End: 2018-08-15
Attending: INTERNAL MEDICINE
Payer: MEDICARE

## 2018-08-15 DIAGNOSIS — E05.80 HYPERTHYROIDISM SECONDARY TO AMIODARONE: ICD-10-CM

## 2018-08-15 DIAGNOSIS — Z79.899 MEDICATION MANAGEMENT: ICD-10-CM

## 2018-08-15 DIAGNOSIS — T46.2X5A HYPERTHYROIDISM SECONDARY TO AMIODARONE: ICD-10-CM

## 2018-08-15 LAB
INR BLD: 2.78 (ref 0.9–1.1)
PSA SERPL DL<=0.01 NG/ML-MCNC: 30.2 SECONDS (ref 12.4–14.7)
T4 FREE SERPL-MCNC: 1.3 NG/DL (ref 0.9–1.8)
TSI SER-ACNC: 1.74 MIU/ML (ref 0.35–5.5)

## 2018-08-15 PROCEDURE — 36415 COLL VENOUS BLD VENIPUNCTURE: CPT

## 2018-08-15 PROCEDURE — 84443 ASSAY THYROID STIM HORMONE: CPT

## 2018-08-15 PROCEDURE — 84439 ASSAY OF FREE THYROXINE: CPT

## 2018-08-15 PROCEDURE — 85610 PROTHROMBIN TIME: CPT | Performed by: INTERNAL MEDICINE

## 2018-08-31 ENCOUNTER — SNF/IP PROF CHARGE ONLY (OUTPATIENT)
Dept: HEMATOLOGY/ONCOLOGY | Facility: HOSPITAL | Age: 73
End: 2018-08-31

## 2018-08-31 DIAGNOSIS — Z17.0 MALIGNANT NEOPLASM OF LEFT BREAST IN FEMALE, ESTROGEN RECEPTOR POSITIVE, UNSPECIFIED SITE OF BREAST (HCC): ICD-10-CM

## 2018-08-31 DIAGNOSIS — C50.912 MALIGNANT NEOPLASM OF LEFT BREAST IN FEMALE, ESTROGEN RECEPTOR POSITIVE, UNSPECIFIED SITE OF BREAST (HCC): ICD-10-CM

## 2018-08-31 PROCEDURE — G9678 ONCOLOGY CARE MODEL SERVICE: HCPCS | Performed by: INTERNAL MEDICINE

## 2018-09-12 ENCOUNTER — PRIOR ORIGINAL RECORDS (OUTPATIENT)
Dept: OTHER | Age: 73
End: 2018-09-12

## 2018-09-12 ENCOUNTER — HOSPITAL ENCOUNTER (OUTPATIENT)
Dept: LAB | Facility: HOSPITAL | Age: 73
Discharge: HOME | End: 2018-09-12
Attending: INTERNAL MEDICINE
Payer: MEDICARE

## 2018-09-12 LAB
INR BLD: 2.46 (ref 0.9–1.1)
INR: 2.46
PSA SERPL DL<=0.01 NG/ML-MCNC: 27.5 SECONDS (ref 12.4–14.7)

## 2018-09-12 PROCEDURE — 36415 COLL VENOUS BLD VENIPUNCTURE: CPT | Performed by: INTERNAL MEDICINE

## 2018-09-12 PROCEDURE — 85610 PROTHROMBIN TIME: CPT | Performed by: INTERNAL MEDICINE

## 2018-09-30 ENCOUNTER — SNF/IP PROF CHARGE ONLY (OUTPATIENT)
Dept: HEMATOLOGY/ONCOLOGY | Facility: HOSPITAL | Age: 73
End: 2018-09-30

## 2018-09-30 DIAGNOSIS — Z17.0 MALIGNANT NEOPLASM OF LEFT BREAST IN FEMALE, ESTROGEN RECEPTOR POSITIVE, UNSPECIFIED SITE OF BREAST (HCC): ICD-10-CM

## 2018-09-30 DIAGNOSIS — C50.912 MALIGNANT NEOPLASM OF LEFT BREAST IN FEMALE, ESTROGEN RECEPTOR POSITIVE, UNSPECIFIED SITE OF BREAST (HCC): ICD-10-CM

## 2018-09-30 PROCEDURE — G9678 ONCOLOGY CARE MODEL SERVICE: HCPCS | Performed by: INTERNAL MEDICINE

## 2018-10-10 ENCOUNTER — PRIOR ORIGINAL RECORDS (OUTPATIENT)
Dept: OTHER | Age: 73
End: 2018-10-10

## 2018-10-10 ENCOUNTER — HOSPITAL ENCOUNTER (OUTPATIENT)
Dept: LAB | Facility: HOSPITAL | Age: 73
Discharge: HOME | End: 2018-10-10
Attending: INTERNAL MEDICINE
Payer: MEDICARE

## 2018-10-10 LAB — INR: 2.4

## 2018-10-10 PROCEDURE — 85610 PROTHROMBIN TIME: CPT | Performed by: INTERNAL MEDICINE

## 2018-10-10 PROCEDURE — 36415 COLL VENOUS BLD VENIPUNCTURE: CPT | Performed by: INTERNAL MEDICINE

## 2018-10-24 ENCOUNTER — HOSPITAL ENCOUNTER (OUTPATIENT)
Dept: MAMMOGRAPHY | Facility: HOSPITAL | Age: 73
Discharge: HOME | End: 2018-10-24
Attending: INTERNAL MEDICINE
Payer: MEDICARE

## 2018-10-24 ENCOUNTER — HOSPITAL ENCOUNTER (OUTPATIENT)
Dept: BONE DENSITY | Age: 73
Discharge: HOME | End: 2018-10-24
Attending: INTERNAL MEDICINE
Payer: MEDICARE

## 2018-10-24 DIAGNOSIS — C50.912 MALIGNANT NEOPLASM OF LEFT BREAST IN FEMALE, ESTROGEN RECEPTOR POSITIVE, UNSPECIFIED SITE OF BREAST (HCC): ICD-10-CM

## 2018-10-24 DIAGNOSIS — Z17.0 MALIGNANT NEOPLASM OF LEFT BREAST IN FEMALE, ESTROGEN RECEPTOR POSITIVE, UNSPECIFIED SITE OF BREAST (HCC): ICD-10-CM

## 2018-10-24 DIAGNOSIS — M85.88 OSTEOPENIA OF SPINE: ICD-10-CM

## 2018-10-24 PROCEDURE — 77080 DXA BONE DENSITY AXIAL: CPT | Performed by: INTERNAL MEDICINE

## 2018-10-24 PROCEDURE — 77062 BREAST TOMOSYNTHESIS BI: CPT | Performed by: INTERNAL MEDICINE

## 2018-10-24 PROCEDURE — 76642 ULTRASOUND BREAST LIMITED: CPT | Performed by: INTERNAL MEDICINE

## 2018-10-24 PROCEDURE — 77066 DX MAMMO INCL CAD BI: CPT | Performed by: INTERNAL MEDICINE

## 2018-10-25 ENCOUNTER — TELEPHONE (OUTPATIENT)
Dept: HEMATOLOGY/ONCOLOGY | Facility: HOSPITAL | Age: 73
End: 2018-10-25

## 2018-10-25 NOTE — TELEPHONE ENCOUNTER
Renetta Mata MD  [facility] [address] [facility]             Call, mammo ok      Pt's spouse aware of results and verbalized understanding.

## 2018-10-31 ENCOUNTER — SNF/IP PROF CHARGE ONLY (OUTPATIENT)
Dept: HEMATOLOGY/ONCOLOGY | Facility: HOSPITAL | Age: 73
End: 2018-10-31

## 2018-10-31 DIAGNOSIS — C50.912 MALIGNANT NEOPLASM OF LEFT BREAST IN FEMALE, ESTROGEN RECEPTOR POSITIVE, UNSPECIFIED SITE OF BREAST (HCC): ICD-10-CM

## 2018-10-31 DIAGNOSIS — Z17.0 MALIGNANT NEOPLASM OF LEFT BREAST IN FEMALE, ESTROGEN RECEPTOR POSITIVE, UNSPECIFIED SITE OF BREAST (HCC): ICD-10-CM

## 2018-10-31 PROCEDURE — G9678 ONCOLOGY CARE MODEL SERVICE: HCPCS | Performed by: INTERNAL MEDICINE

## 2018-11-05 ENCOUNTER — PRIOR ORIGINAL RECORDS (OUTPATIENT)
Dept: OTHER | Age: 73
End: 2018-11-05

## 2018-11-05 ENCOUNTER — HOSPITAL ENCOUNTER (OUTPATIENT)
Dept: LAB | Facility: HOSPITAL | Age: 73
Discharge: HOME | End: 2018-11-05
Attending: INTERNAL MEDICINE
Payer: MEDICARE

## 2018-11-05 LAB — INR: 2.47

## 2018-11-05 PROCEDURE — 85610 PROTHROMBIN TIME: CPT | Performed by: INTERNAL MEDICINE

## 2018-11-05 PROCEDURE — 36415 COLL VENOUS BLD VENIPUNCTURE: CPT | Performed by: INTERNAL MEDICINE

## 2018-11-07 ENCOUNTER — OFFICE VISIT (OUTPATIENT)
Dept: HEMATOLOGY/ONCOLOGY | Facility: HOSPITAL | Age: 73
End: 2018-11-07
Attending: INTERNAL MEDICINE
Payer: MEDICARE

## 2018-11-07 VITALS
OXYGEN SATURATION: 94 % | TEMPERATURE: 97 F | HEART RATE: 60 BPM | SYSTOLIC BLOOD PRESSURE: 144 MMHG | WEIGHT: 151.81 LBS | HEIGHT: 64.02 IN | BODY MASS INDEX: 25.92 KG/M2 | DIASTOLIC BLOOD PRESSURE: 77 MMHG | RESPIRATION RATE: 18 BRPM

## 2018-11-07 DIAGNOSIS — M85.88 OSTEOPENIA OF SPINE: ICD-10-CM

## 2018-11-07 DIAGNOSIS — C50.912 MALIGNANT NEOPLASM OF LEFT BREAST IN FEMALE, ESTROGEN RECEPTOR POSITIVE, UNSPECIFIED SITE OF BREAST (HCC): Primary | ICD-10-CM

## 2018-11-07 DIAGNOSIS — Z17.0 MALIGNANT NEOPLASM OF LEFT BREAST IN FEMALE, ESTROGEN RECEPTOR POSITIVE, UNSPECIFIED SITE OF BREAST (HCC): Primary | ICD-10-CM

## 2018-11-07 PROCEDURE — 99213 OFFICE O/P EST LOW 20 MIN: CPT | Performed by: INTERNAL MEDICINE

## 2018-11-07 NOTE — PROGRESS NOTES
[facility] Progress Note      Patient Name:  Bob Fox  YOB: 1945  Medical Record Number:  [id number]    Date of visit: 11/7/2018    CHIEF COMPLAINT: Stage IIA left breast carcinoma, status post lumpectomy and radiation.     HPI week. Hold x1 week. Repeat PRN with flares. Disp: 15 g Rfl: 3   ramipril (ALTACE) 5 MG Oral Cap Take 1 capsule (5 mg total) by mouth daily.  Disp: 30 capsule Rfl: 11   Warfarin Sodium (COUMADIN) 5 MG Oral Tab Takes 2.5 mg on tues, thurs, Saturday and 5 mg m psychosocial intervention were identified. ASSESSMENT AND PLAN:     # Stage II left breast carcinoma, status post lumpectomy (T1N1MX). She continues to tolerate anastrozole well. She completes 5 years of endocrine Rx in 3/18.   Only risk factor is posit

## 2018-11-07 NOTE — PROGRESS NOTES
Pt here for MD f/u for h/o breast ca. Continues on letrozole daily. Reports that the last 2 months she has been experiencing right shoulder pain and unable to lift past shoulder level; denies any trauma or injury to area.      Outpatient Oncology Care Plan

## 2018-11-30 ENCOUNTER — SNF/IP PROF CHARGE ONLY (OUTPATIENT)
Dept: HEMATOLOGY/ONCOLOGY | Facility: HOSPITAL | Age: 73
End: 2018-11-30

## 2018-11-30 DIAGNOSIS — Z17.0 MALIGNANT NEOPLASM OF LEFT BREAST IN FEMALE, ESTROGEN RECEPTOR POSITIVE, UNSPECIFIED SITE OF BREAST (HCC): ICD-10-CM

## 2018-11-30 DIAGNOSIS — C50.912 MALIGNANT NEOPLASM OF LEFT BREAST IN FEMALE, ESTROGEN RECEPTOR POSITIVE, UNSPECIFIED SITE OF BREAST (HCC): ICD-10-CM

## 2018-11-30 PROCEDURE — G9678 ONCOLOGY CARE MODEL SERVICE: HCPCS | Performed by: INTERNAL MEDICINE

## 2018-12-05 ENCOUNTER — HOSPITAL ENCOUNTER (OUTPATIENT)
Dept: LAB | Facility: HOSPITAL | Age: 73
Discharge: HOME | End: 2018-12-05
Attending: INTERNAL MEDICINE
Payer: MEDICARE

## 2018-12-05 ENCOUNTER — PRIOR ORIGINAL RECORDS (OUTPATIENT)
Dept: OTHER | Age: 73
End: 2018-12-05

## 2018-12-05 DIAGNOSIS — I48.92 ATRIAL FLUTTER (HCC): ICD-10-CM

## 2018-12-05 DIAGNOSIS — I48.0 PAROXYSMAL ATRIAL FIBRILLATION (HCC): ICD-10-CM

## 2018-12-05 LAB — INR: 3.4

## 2018-12-05 PROCEDURE — 85610 PROTHROMBIN TIME: CPT

## 2018-12-19 ENCOUNTER — HOSPITAL ENCOUNTER (OUTPATIENT)
Dept: LAB | Facility: HOSPITAL | Age: 73
Discharge: HOME | End: 2018-12-19
Attending: INTERNAL MEDICINE
Payer: MEDICARE

## 2018-12-19 ENCOUNTER — PRIOR ORIGINAL RECORDS (OUTPATIENT)
Dept: OTHER | Age: 73
End: 2018-12-19

## 2018-12-19 DIAGNOSIS — I48.0 PAROXYSMAL ATRIAL FIBRILLATION (HCC): ICD-10-CM

## 2018-12-19 DIAGNOSIS — I48.92 ATRIAL FLUTTER (HCC): ICD-10-CM

## 2018-12-19 LAB — INR: 3

## 2018-12-19 PROCEDURE — 85610 PROTHROMBIN TIME: CPT

## 2019-01-02 ENCOUNTER — HOSPITAL ENCOUNTER (OUTPATIENT)
Dept: LAB | Facility: HOSPITAL | Age: 74
Discharge: HOME | End: 2019-01-02
Attending: INTERNAL MEDICINE
Payer: MEDICARE

## 2019-01-02 ENCOUNTER — PRIOR ORIGINAL RECORDS (OUTPATIENT)
Dept: OTHER | Age: 74
End: 2019-01-02

## 2019-01-02 DIAGNOSIS — I48.92 ATRIAL FLUTTER (HCC): ICD-10-CM

## 2019-01-02 DIAGNOSIS — I48.0 PAROXYSMAL ATRIAL FIBRILLATION (HCC): ICD-10-CM

## 2019-01-02 LAB
INR: 2.3
POC INR: 2.3 (ref 0.8–1.3)

## 2019-01-02 PROCEDURE — 85610 PROTHROMBIN TIME: CPT

## 2019-01-16 ENCOUNTER — PRIOR ORIGINAL RECORDS (OUTPATIENT)
Dept: OTHER | Age: 74
End: 2019-01-16

## 2019-01-16 ENCOUNTER — HOSPITAL ENCOUNTER (OUTPATIENT)
Dept: LAB | Facility: HOSPITAL | Age: 74
Discharge: HOME | End: 2019-01-16
Attending: INTERNAL MEDICINE
Payer: MEDICARE

## 2019-01-16 DIAGNOSIS — I48.92 ATRIAL FLUTTER (HCC): ICD-10-CM

## 2019-01-16 DIAGNOSIS — I48.0 PAROXYSMAL ATRIAL FIBRILLATION (HCC): ICD-10-CM

## 2019-01-16 LAB
ALBUMIN SERPL-MCNC: 3.6 G/DL (ref 3.1–4.5)
ALBUMIN/GLOB SERPL: 0.9 {RATIO} (ref 1–2)
ALP LIVER SERPL-CCNC: 81 U/L (ref 55–142)
ALT SERPL-CCNC: 20 U/L (ref 14–54)
ANION GAP SERPL CALC-SCNC: 6 MMOL/L (ref 0–18)
AST SERPL-CCNC: 15 U/L (ref 15–41)
BASOPHILS # BLD AUTO: 0.02 X10(3) UL (ref 0–0.1)
BASOPHILS NFR BLD AUTO: 0.6 %
BILIRUB SERPL-MCNC: 0.4 MG/DL (ref 0.1–2)
BUN BLD-MCNC: 21 MG/DL (ref 8–20)
BUN/CREAT SERPL: 23.3 (ref 10–20)
CALCIUM BLD-MCNC: 8.9 MG/DL (ref 8.3–10.3)
CHLORIDE SERPL-SCNC: 110 MMOL/L (ref 101–111)
CHOLEST SMN-MCNC: 172 MG/DL (ref ?–200)
CO2 SERPL-SCNC: 25 MMOL/L (ref 22–32)
CREAT BLD-MCNC: 0.9 MG/DL (ref 0.55–1.02)
EOSINOPHIL # BLD AUTO: 0.02 X10(3) UL (ref 0–0.3)
EOSINOPHIL NFR BLD AUTO: 0.6 %
ERYTHROCYTE [DISTWIDTH] IN BLOOD BY AUTOMATED COUNT: 12.8 % (ref 11.5–16)
GLOBULIN PLAS-MCNC: 3.9 G/DL (ref 2.8–4.4)
GLUCOSE BLD-MCNC: 94 MG/DL (ref 70–99)
HCT VFR BLD AUTO: 40.1 % (ref 34–50)
HDLC SERPL-MCNC: 88 MG/DL (ref 40–59)
HGB BLD-MCNC: 13 G/DL (ref 12–16)
IMMATURE GRANULOCYTE COUNT: 0.01 X10(3) UL (ref 0–1)
IMMATURE GRANULOCYTE RATIO %: 0.3 %
INR: 2.5
LDLC SERPL CALC-MCNC: 63 MG/DL (ref ?–100)
LYMPHOCYTES # BLD AUTO: 1.61 X10(3) UL (ref 0.9–4)
LYMPHOCYTES NFR BLD AUTO: 46.1 %
M PROTEIN MFR SERPL ELPH: 7.5 G/DL (ref 6.4–8.2)
MCH RBC QN AUTO: 29 PG (ref 27–33.2)
MCHC RBC AUTO-ENTMCNC: 32.4 G/DL (ref 31–37)
MCV RBC AUTO: 89.3 FL (ref 81–100)
MONOCYTES # BLD AUTO: 0.65 X10(3) UL (ref 0.1–1)
MONOCYTES NFR BLD AUTO: 18.6 %
NEUTROPHIL ABS PRELIM: 1.18 X10 (3) UL (ref 1.3–6.7)
NEUTROPHILS # BLD AUTO: 1.18 X10(3) UL (ref 1.3–6.7)
NEUTROPHILS NFR BLD AUTO: 33.8 %
NONHDLC SERPL-MCNC: 84 MG/DL (ref ?–130)
OSMOLALITY SERPL CALC.SUM OF ELEC: 295 MOSM/KG (ref 275–295)
PLATELET # BLD AUTO: 172 10(3)UL (ref 150–450)
POC INR: 2.5 (ref 0.8–1.3)
POTASSIUM SERPL-SCNC: 4.3 MMOL/L (ref 3.6–5.1)
RBC # BLD AUTO: 4.49 X10(6)UL (ref 3.8–5.1)
RED CELL DISTRIBUTION WIDTH-SD: 42.4 FL (ref 35.1–46.3)
SODIUM SERPL-SCNC: 141 MMOL/L (ref 136–144)
TRIGL SERPL-MCNC: 106 MG/DL (ref 30–149)
VLDLC SERPL CALC-MCNC: 21 MG/DL (ref 0–30)
WBC # BLD AUTO: 3.5 X10(3) UL (ref 4–13)

## 2019-01-16 PROCEDURE — 80061 LIPID PANEL: CPT | Performed by: INTERNAL MEDICINE

## 2019-01-16 PROCEDURE — 36415 COLL VENOUS BLD VENIPUNCTURE: CPT | Performed by: INTERNAL MEDICINE

## 2019-01-16 PROCEDURE — 80053 COMPREHEN METABOLIC PANEL: CPT | Performed by: INTERNAL MEDICINE

## 2019-01-16 PROCEDURE — 85610 PROTHROMBIN TIME: CPT

## 2019-01-16 PROCEDURE — 85025 COMPLETE CBC W/AUTO DIFF WBC: CPT | Performed by: INTERNAL MEDICINE

## 2019-02-05 ENCOUNTER — PRIOR ORIGINAL RECORDS (OUTPATIENT)
Dept: OTHER | Age: 74
End: 2019-02-05

## 2019-02-05 ENCOUNTER — MYAURORA ACCOUNT LINK (OUTPATIENT)
Dept: OTHER | Age: 74
End: 2019-02-05

## 2019-02-06 LAB
ALBUMIN: 3.6 G/DL
ALKALINE PHOSPHATATE(ALK PHOS): 81 IU/L
BILIRUBIN TOTAL: 0.4 MG/DL
BUN: 21 MG/DL
CALCIUM: 8.9 MG/DL
CHLORIDE: 110 MEQ/L
CHOLESTEROL, TOTAL: 172 MG/DL
CREATININE, SERUM: 0.9 MG/DL
GLOBULIN: 3.9 G/DL
GLUCOSE: 94 MG/DL
HDL CHOLESTEROL: 88 MG/DL
HEMATOCRIT: 40.1 %
HEMOGLOBIN: 13 G/DL
LDL CHOLESTEROL: 63 MG/DL
PLATELETS: 172 K/UL
POTASSIUM, SERUM: 4.3 MEQ/L
PROTEIN, TOTAL: 7.5 G/DL
RED BLOOD COUNT: 4.49 X 10-6/U
SGOT (AST): 15 IU/L
SGPT (ALT): 20 IU/L
SODIUM: 141 MEQ/L
TRIGLYCERIDES: 106 MG/DL
WHITE BLOOD COUNT: 3.5 X 10-3/U

## 2019-02-13 ENCOUNTER — PRIOR ORIGINAL RECORDS (OUTPATIENT)
Dept: OTHER | Age: 74
End: 2019-02-13

## 2019-02-13 ENCOUNTER — ANTI-COAG (OUTPATIENT)
Dept: CARDIOLOGY | Age: 74
End: 2019-02-13

## 2019-02-13 ENCOUNTER — HOSPITAL ENCOUNTER (OUTPATIENT)
Dept: LAB | Facility: HOSPITAL | Age: 74
Discharge: HOME | End: 2019-02-13
Attending: INTERNAL MEDICINE
Payer: MEDICARE

## 2019-02-13 DIAGNOSIS — I48.92 ATRIAL FLUTTER, UNSPECIFIED TYPE (CMD): ICD-10-CM

## 2019-02-13 DIAGNOSIS — I48.92 ATRIAL FLUTTER (HCC): ICD-10-CM

## 2019-02-13 DIAGNOSIS — I48.0 PAROXYSMAL ATRIAL FIBRILLATION (HCC): ICD-10-CM

## 2019-02-13 DIAGNOSIS — I48.0 PAROXYSMAL ATRIAL FIBRILLATION (CMD): ICD-10-CM

## 2019-02-13 LAB
INR: 2.5
POC INR: 2.5 (ref 0.8–1.3)

## 2019-02-13 PROCEDURE — 85610 PROTHROMBIN TIME: CPT

## 2019-02-20 ENCOUNTER — HOSPITAL ENCOUNTER (OUTPATIENT)
Dept: LAB | Facility: HOSPITAL | Age: 74
Discharge: HOME | End: 2019-02-20
Attending: INTERNAL MEDICINE
Payer: MEDICARE

## 2019-02-20 DIAGNOSIS — T46.2X5A HYPERTHYROIDISM SECONDARY TO AMIODARONE: ICD-10-CM

## 2019-02-20 DIAGNOSIS — E05.80 HYPERTHYROIDISM SECONDARY TO AMIODARONE: ICD-10-CM

## 2019-02-20 DIAGNOSIS — Z79.899 ENCOUNTER FOR LONG-TERM (CURRENT) USE OF OTHER MEDICATIONS: ICD-10-CM

## 2019-02-20 LAB
T4 FREE SERPL-MCNC: 1.4 NG/DL (ref 0.8–1.7)
TSI SER-ACNC: 1.9 MIU/ML (ref 0.36–3.74)

## 2019-02-20 PROCEDURE — 84439 ASSAY OF FREE THYROXINE: CPT | Performed by: INTERNAL MEDICINE

## 2019-02-20 PROCEDURE — 84443 ASSAY THYROID STIM HORMONE: CPT | Performed by: INTERNAL MEDICINE

## 2019-02-20 PROCEDURE — 36415 COLL VENOUS BLD VENIPUNCTURE: CPT | Performed by: INTERNAL MEDICINE

## 2019-02-27 PROBLEM — I48.0 PAROXYSMAL ATRIAL FIBRILLATION (CMD): Status: ACTIVE | Noted: 2019-02-27

## 2019-02-27 PROBLEM — I48.92 UNSPECIFIED ATRIAL FLUTTER (CMD): Status: ACTIVE | Noted: 2019-02-27

## 2019-02-28 VITALS
HEIGHT: 63 IN | SYSTOLIC BLOOD PRESSURE: 118 MMHG | HEART RATE: 80 BPM | BODY MASS INDEX: 26.4 KG/M2 | WEIGHT: 149 LBS | DIASTOLIC BLOOD PRESSURE: 80 MMHG

## 2019-02-28 VITALS
BODY MASS INDEX: 26.93 KG/M2 | HEART RATE: 74 BPM | WEIGHT: 152 LBS | DIASTOLIC BLOOD PRESSURE: 90 MMHG | HEIGHT: 63 IN | SYSTOLIC BLOOD PRESSURE: 114 MMHG

## 2019-02-28 VITALS
BODY MASS INDEX: 27.11 KG/M2 | DIASTOLIC BLOOD PRESSURE: 74 MMHG | HEIGHT: 63 IN | HEART RATE: 66 BPM | SYSTOLIC BLOOD PRESSURE: 130 MMHG | WEIGHT: 153 LBS

## 2019-03-01 VITALS
HEIGHT: 63 IN | WEIGHT: 155 LBS | BODY MASS INDEX: 27.46 KG/M2 | SYSTOLIC BLOOD PRESSURE: 140 MMHG | DIASTOLIC BLOOD PRESSURE: 76 MMHG | HEART RATE: 64 BPM

## 2019-03-05 DIAGNOSIS — C50.919 BREAST CANCER (HCC): ICD-10-CM

## 2019-03-05 RX ORDER — ANASTROZOLE 1 MG/1
1 TABLET ORAL
COMMUNITY
Start: 2013-09-26 | End: 2019-09-24 | Stop reason: ALTCHOICE

## 2019-03-05 RX ORDER — RAMIPRIL 5 MG/1
CAPSULE ORAL
Qty: 90 CAPSULE | Refills: 1 | Status: SHIPPED | OUTPATIENT
Start: 2019-03-05 | End: 2019-09-03 | Stop reason: SDUPTHER

## 2019-03-05 RX ORDER — ANASTROZOLE 1 MG/1
TABLET ORAL
Qty: 90 TABLET | Refills: 1 | Status: SHIPPED | OUTPATIENT
Start: 2019-03-05 | End: 2019-09-03

## 2019-03-05 RX ORDER — AMIODARONE HYDROCHLORIDE 200 MG/1
200 TABLET ORAL
COMMUNITY
Start: 2017-12-19 | End: 2019-07-16 | Stop reason: SDUPTHER

## 2019-03-05 RX ORDER — AMOXICILLIN 500 MG/1
500 TABLET, FILM COATED ORAL
COMMUNITY
Start: 2014-01-21

## 2019-03-05 RX ORDER — RAMIPRIL 5 MG/1
5 CAPSULE ORAL
COMMUNITY
Start: 2018-10-11 | End: 2019-03-05 | Stop reason: SDUPTHER

## 2019-03-05 RX ORDER — CYANOCOBALAMIN (VITAMIN B-12) 1000 MCG
TABLET, SUBLINGUAL SUBLINGUAL
COMMUNITY
Start: 2018-07-17 | End: 2019-09-24 | Stop reason: SDUPTHER

## 2019-03-05 RX ORDER — WARFARIN SODIUM 5 MG/1
5 TABLET ORAL
COMMUNITY
Start: 2018-10-11 | End: 2019-07-15 | Stop reason: SDUPTHER

## 2019-03-05 RX ORDER — ASPIRIN 81 MG/1
81 TABLET ORAL
COMMUNITY
Start: 2018-07-17 | End: 2019-09-24 | Stop reason: ALTCHOICE

## 2019-03-05 RX ORDER — SIMVASTATIN 40 MG
40 TABLET ORAL
COMMUNITY
Start: 2018-02-12 | End: 2019-08-12 | Stop reason: SDUPTHER

## 2019-03-07 ENCOUNTER — TELEPHONE (OUTPATIENT)
Dept: CARDIOLOGY | Age: 74
End: 2019-03-07

## 2019-03-13 ENCOUNTER — ANTI-COAG (OUTPATIENT)
Dept: CARDIOLOGY | Age: 74
End: 2019-03-13

## 2019-03-13 ENCOUNTER — HOSPITAL ENCOUNTER (OUTPATIENT)
Dept: LAB | Facility: HOSPITAL | Age: 74
Discharge: HOME | End: 2019-03-13
Attending: INTERNAL MEDICINE
Payer: MEDICARE

## 2019-03-13 DIAGNOSIS — I48.0 PAROXYSMAL ATRIAL FIBRILLATION (CMD): ICD-10-CM

## 2019-03-13 DIAGNOSIS — I48.92 ATRIAL FLUTTER, UNSPECIFIED TYPE (CMD): ICD-10-CM

## 2019-03-13 DIAGNOSIS — I48.92 ATRIAL FLUTTER (HCC): ICD-10-CM

## 2019-03-13 DIAGNOSIS — I48.0 PAROXYSMAL ATRIAL FIBRILLATION (HCC): ICD-10-CM

## 2019-03-13 LAB
INR PPP: 2.3
POC INR: 2.3 (ref 0.8–1.3)

## 2019-03-13 PROCEDURE — 85610 PROTHROMBIN TIME: CPT

## 2019-04-10 ENCOUNTER — HOSPITAL ENCOUNTER (OUTPATIENT)
Dept: LAB | Facility: HOSPITAL | Age: 74
Discharge: HOME | End: 2019-04-10
Attending: INTERNAL MEDICINE
Payer: MEDICARE

## 2019-04-10 ENCOUNTER — ANTI-COAG (OUTPATIENT)
Dept: CARDIOLOGY | Age: 74
End: 2019-04-10

## 2019-04-10 DIAGNOSIS — I48.92 ATRIAL FLUTTER, UNSPECIFIED TYPE (CMD): ICD-10-CM

## 2019-04-10 DIAGNOSIS — I48.0 PAROXYSMAL ATRIAL FIBRILLATION (CMD): ICD-10-CM

## 2019-04-10 DIAGNOSIS — I48.0 PAROXYSMAL ATRIAL FIBRILLATION (HCC): ICD-10-CM

## 2019-04-10 DIAGNOSIS — I48.92 ATRIAL FLUTTER (HCC): ICD-10-CM

## 2019-04-10 LAB — INR PPP: 2.9

## 2019-04-10 PROCEDURE — 85610 PROTHROMBIN TIME: CPT

## 2019-05-06 NOTE — PROGRESS NOTES
[facility] Progress Note      Patient Name:  Ru Valdez  YOB: 1945  Medical Record Number:  [id number]    Date of visit: 5/7/2019    CHIEF COMPLAINT: Stage IIA left breast carcinoma, status post lumpectomy and radiation.     HPI: 600 PLUS-VIT D OR) Take 600 mg by mouth 2 (two) times daily. Disp:  Rfl:    Mometasone Furoate 0.1 % External Cream Apply to AAs of face BID x1 week. Hold x1 week. Repeat PRN with flares.  Disp: 15 g Rfl: 3   ramipril (ALTACE) 5 MG Oral Cap Take 1 capsule recommend 10 years of endocrine therapy. Plan to complete 3/23. Due for mammogram 10/19. # Osteopenia:  DEXA 10/18 showed osteopenia iwith T score -1.3 is better than DEXA 10/16. Repeat 2 years.       ORDERS PLACED:        [facility]AR[facility] 2D+3D DIAGNOSTIC MA

## 2019-05-07 ENCOUNTER — ANTI-COAG (OUTPATIENT)
Dept: CARDIOLOGY | Age: 74
End: 2019-05-07

## 2019-05-07 ENCOUNTER — HOSPITAL ENCOUNTER (OUTPATIENT)
Dept: LAB | Facility: HOSPITAL | Age: 74
Discharge: HOME | End: 2019-05-07
Attending: INTERNAL MEDICINE
Payer: MEDICARE

## 2019-05-07 ENCOUNTER — OFFICE VISIT (OUTPATIENT)
Dept: HEMATOLOGY/ONCOLOGY | Facility: HOSPITAL | Age: 74
End: 2019-05-07
Attending: INTERNAL MEDICINE
Payer: MEDICARE

## 2019-05-07 VITALS
DIASTOLIC BLOOD PRESSURE: 68 MMHG | WEIGHT: 145 LBS | HEART RATE: 59 BPM | SYSTOLIC BLOOD PRESSURE: 128 MMHG | TEMPERATURE: 96 F | RESPIRATION RATE: 18 BRPM | BODY MASS INDEX: 24.75 KG/M2 | HEIGHT: 64.02 IN

## 2019-05-07 DIAGNOSIS — I48.0 PAROXYSMAL ATRIAL FIBRILLATION (CMD): ICD-10-CM

## 2019-05-07 DIAGNOSIS — I48.0 PAROXYSMAL ATRIAL FIBRILLATION (HCC): ICD-10-CM

## 2019-05-07 DIAGNOSIS — I48.92 ATRIAL FLUTTER, UNSPECIFIED TYPE (CMD): ICD-10-CM

## 2019-05-07 DIAGNOSIS — Z17.0 MALIGNANT NEOPLASM OF LEFT BREAST IN FEMALE, ESTROGEN RECEPTOR POSITIVE, UNSPECIFIED SITE OF BREAST (HCC): Primary | ICD-10-CM

## 2019-05-07 DIAGNOSIS — C50.912 MALIGNANT NEOPLASM OF LEFT BREAST IN FEMALE, ESTROGEN RECEPTOR POSITIVE, UNSPECIFIED SITE OF BREAST (HCC): Primary | ICD-10-CM

## 2019-05-07 LAB — INR PPP: 3.2

## 2019-05-07 PROCEDURE — 99213 OFFICE O/P EST LOW 20 MIN: CPT | Performed by: INTERNAL MEDICINE

## 2019-05-07 PROCEDURE — 85610 PROTHROMBIN TIME: CPT

## 2019-05-07 RX ORDER — WARFARIN SODIUM 5 MG/1
5 TABLET ORAL NIGHTLY
COMMUNITY

## 2019-05-20 DIAGNOSIS — I48.92 ATRIAL FLUTTER, UNSPECIFIED TYPE (CMD): Primary | ICD-10-CM

## 2019-05-22 ENCOUNTER — ANTI-COAG (OUTPATIENT)
Dept: CARDIOLOGY | Age: 74
End: 2019-05-22

## 2019-05-22 ENCOUNTER — HOSPITAL ENCOUNTER (OUTPATIENT)
Dept: LAB | Facility: HOSPITAL | Age: 74
Discharge: HOME | End: 2019-05-22
Attending: INTERNAL MEDICINE
Payer: MEDICARE

## 2019-05-22 DIAGNOSIS — I48.92 ATRIAL FLUTTER, UNSPECIFIED TYPE (CMD): ICD-10-CM

## 2019-05-22 DIAGNOSIS — I48.0 PAROXYSMAL ATRIAL FIBRILLATION (CMD): ICD-10-CM

## 2019-05-22 DIAGNOSIS — I48.0 PAROXYSMAL ATRIAL FIBRILLATION (HCC): ICD-10-CM

## 2019-05-22 LAB — INR PPP: 2.6

## 2019-05-22 PROCEDURE — 85610 PROTHROMBIN TIME: CPT

## 2019-05-29 ENCOUNTER — HOSPITAL ENCOUNTER (OUTPATIENT)
Dept: CV DIAGNOSTICS | Facility: HOSPITAL | Age: 74
Discharge: HOME | End: 2019-05-29
Attending: INTERNAL MEDICINE
Payer: MEDICARE

## 2019-05-29 DIAGNOSIS — I48.92 ATRIAL FLUTTER, UNSPECIFIED TYPE (CMD): ICD-10-CM

## 2019-05-29 DIAGNOSIS — I48.92 ATRIAL FLUTTER, UNSPECIFIED TYPE (HCC): ICD-10-CM

## 2019-05-29 PROCEDURE — 93306 TTE W/DOPPLER COMPLETE: CPT | Performed by: INTERNAL MEDICINE

## 2019-06-10 ENCOUNTER — TELEPHONE (OUTPATIENT)
Dept: CARDIOLOGY | Age: 74
End: 2019-06-10

## 2019-06-12 ENCOUNTER — HOSPITAL ENCOUNTER (OUTPATIENT)
Dept: LAB | Facility: HOSPITAL | Age: 74
Discharge: HOME | End: 2019-06-12
Attending: INTERNAL MEDICINE
Payer: MEDICARE

## 2019-06-12 ENCOUNTER — ANTI-COAG (OUTPATIENT)
Dept: CARDIOLOGY | Age: 74
End: 2019-06-12

## 2019-06-12 ENCOUNTER — WALK IN (OUTPATIENT)
Dept: URGENT CARE | Age: 74
End: 2019-06-12

## 2019-06-12 VITALS
DIASTOLIC BLOOD PRESSURE: 68 MMHG | OXYGEN SATURATION: 95 % | HEART RATE: 64 BPM | WEIGHT: 140 LBS | BODY MASS INDEX: 23.9 KG/M2 | TEMPERATURE: 98 F | SYSTOLIC BLOOD PRESSURE: 118 MMHG | RESPIRATION RATE: 18 BRPM | HEIGHT: 64 IN

## 2019-06-12 DIAGNOSIS — I48.0 PAROXYSMAL ATRIAL FIBRILLATION (CMD): ICD-10-CM

## 2019-06-12 DIAGNOSIS — J02.9 SORE THROAT: ICD-10-CM

## 2019-06-12 DIAGNOSIS — I48.0 PAROXYSMAL ATRIAL FIBRILLATION (HCC): ICD-10-CM

## 2019-06-12 DIAGNOSIS — I48.92 ATRIAL FLUTTER, UNSPECIFIED TYPE (CMD): ICD-10-CM

## 2019-06-12 DIAGNOSIS — J02.9 PHARYNGITIS, UNSPECIFIED ETIOLOGY: Primary | ICD-10-CM

## 2019-06-12 LAB
INR PPP: 2.7
INTERNAL PROCEDURAL CONTROLS ACCEPTABLE: YES
S PYO AG THROAT QL IA.RAPID: NEGATIVE

## 2019-06-12 PROCEDURE — 99213 OFFICE O/P EST LOW 20 MIN: CPT | Performed by: NURSE PRACTITIONER

## 2019-06-12 PROCEDURE — 85610 PROTHROMBIN TIME: CPT

## 2019-06-12 PROCEDURE — 87880 STREP A ASSAY W/OPTIC: CPT | Performed by: NURSE PRACTITIONER

## 2019-06-12 RX ORDER — CHOLECALCIFEROL (VITAMIN D3) 125 MCG
500 CAPSULE ORAL
COMMUNITY

## 2019-06-12 RX ORDER — ANASTROZOLE 1 MG/1
TABLET ORAL
COMMUNITY
Start: 2019-03-05

## 2019-06-12 RX ORDER — TRIAMCINOLONE ACETONIDE 0.25 MG/ML
LOTION TOPICAL PRN
COMMUNITY
Start: 2018-09-07

## 2019-06-12 RX ORDER — AMIODARONE HYDROCHLORIDE 100 MG/1
100 TABLET ORAL
COMMUNITY
Start: 2015-12-14 | End: 2019-07-16 | Stop reason: SDUPTHER

## 2019-06-12 SDOH — HEALTH STABILITY: MENTAL HEALTH: HOW OFTEN DO YOU HAVE A DRINK CONTAINING ALCOHOL?: NEVER

## 2019-06-12 ASSESSMENT — ENCOUNTER SYMPTOMS
EYE REDNESS: 0
WEAKNESS: 0
ABDOMINAL PAIN: 0
CHILLS: 0
WHEEZING: 0
EYE PAIN: 0
RHINORRHEA: 1
VOMITING: 0
FEVER: 0
DIARRHEA: 0
HEADACHES: 0
SINUS PAIN: 0
NAUSEA: 0
CHEST TIGHTNESS: 0
ACTIVITY CHANGE: 0
SINUS PRESSURE: 0
SORE THROAT: 1
VERTIGO: 0
APPETITE CHANGE: 0
SHORTNESS OF BREATH: 0
DIZZINESS: 0
FATIGUE: 1
EYE DISCHARGE: 0
COUGH: 0
EYE ITCHING: 0

## 2019-06-30 ENCOUNTER — SNF/IP PROF CHARGE ONLY (OUTPATIENT)
Dept: HEMATOLOGY/ONCOLOGY | Facility: HOSPITAL | Age: 74
End: 2019-06-30

## 2019-06-30 DIAGNOSIS — Z17.0 MALIGNANT NEOPLASM OF LEFT BREAST IN FEMALE, ESTROGEN RECEPTOR POSITIVE, UNSPECIFIED SITE OF BREAST (HCC): ICD-10-CM

## 2019-06-30 DIAGNOSIS — C50.912 MALIGNANT NEOPLASM OF LEFT BREAST IN FEMALE, ESTROGEN RECEPTOR POSITIVE, UNSPECIFIED SITE OF BREAST (HCC): ICD-10-CM

## 2019-06-30 PROCEDURE — G9678 ONCOLOGY CARE MODEL SERVICE: HCPCS | Performed by: INTERNAL MEDICINE

## 2019-07-10 ENCOUNTER — ANTI-COAG (OUTPATIENT)
Dept: CARDIOLOGY | Age: 74
End: 2019-07-10

## 2019-07-10 ENCOUNTER — HOSPITAL ENCOUNTER (OUTPATIENT)
Dept: LAB | Facility: HOSPITAL | Age: 74
Discharge: HOME | End: 2019-07-10
Attending: INTERNAL MEDICINE
Payer: MEDICARE

## 2019-07-10 DIAGNOSIS — I48.92 ATRIAL FLUTTER, UNSPECIFIED TYPE (CMD): ICD-10-CM

## 2019-07-10 DIAGNOSIS — I48.0 PAROXYSMAL ATRIAL FIBRILLATION (CMD): ICD-10-CM

## 2019-07-10 DIAGNOSIS — I48.0 PAROXYSMAL ATRIAL FIBRILLATION (HCC): ICD-10-CM

## 2019-07-10 LAB
INR PPP: 2.6
POC INR: 2.6 (ref 0.8–1.3)

## 2019-07-10 PROCEDURE — 85610 PROTHROMBIN TIME: CPT

## 2019-07-15 RX ORDER — WARFARIN SODIUM 5 MG/1
TABLET ORAL
Qty: 90 TABLET | Refills: 0 | Status: SHIPPED | OUTPATIENT
Start: 2019-07-15 | End: 2019-11-11 | Stop reason: SDUPTHER

## 2019-07-16 ENCOUNTER — TELEPHONE (OUTPATIENT)
Dept: CARDIOLOGY | Age: 74
End: 2019-07-16

## 2019-07-16 RX ORDER — AMIODARONE HYDROCHLORIDE 200 MG/1
TABLET ORAL
Qty: 45 TABLET | Refills: 0 | OUTPATIENT
Start: 2019-07-16

## 2019-07-16 RX ORDER — AMIODARONE HYDROCHLORIDE 200 MG/1
200 TABLET ORAL DAILY
Qty: 30 TABLET | Refills: 6 | Status: SHIPPED | OUTPATIENT
Start: 2019-07-16 | End: 2019-09-24 | Stop reason: ALTCHOICE

## 2019-07-31 ENCOUNTER — SNF/IP PROF CHARGE ONLY (OUTPATIENT)
Dept: HEMATOLOGY/ONCOLOGY | Facility: HOSPITAL | Age: 74
End: 2019-07-31

## 2019-07-31 DIAGNOSIS — C50.912 MALIGNANT NEOPLASM OF LEFT BREAST IN FEMALE, ESTROGEN RECEPTOR POSITIVE, UNSPECIFIED SITE OF BREAST (HCC): ICD-10-CM

## 2019-07-31 DIAGNOSIS — Z17.0 MALIGNANT NEOPLASM OF LEFT BREAST IN FEMALE, ESTROGEN RECEPTOR POSITIVE, UNSPECIFIED SITE OF BREAST (HCC): ICD-10-CM

## 2019-07-31 PROCEDURE — G9678 ONCOLOGY CARE MODEL SERVICE: HCPCS | Performed by: INTERNAL MEDICINE

## 2019-08-07 ENCOUNTER — HOSPITAL ENCOUNTER (OUTPATIENT)
Dept: LAB | Facility: HOSPITAL | Age: 74
Discharge: HOME | End: 2019-08-07
Attending: INTERNAL MEDICINE
Payer: MEDICARE

## 2019-08-07 ENCOUNTER — ANTI-COAG (OUTPATIENT)
Dept: CARDIOLOGY | Age: 74
End: 2019-08-07

## 2019-08-07 DIAGNOSIS — I48.0 PAROXYSMAL ATRIAL FIBRILLATION (CMD): ICD-10-CM

## 2019-08-07 DIAGNOSIS — I48.92 ATRIAL FLUTTER, UNSPECIFIED TYPE (CMD): ICD-10-CM

## 2019-08-07 DIAGNOSIS — I48.0 PAROXYSMAL ATRIAL FIBRILLATION (HCC): ICD-10-CM

## 2019-08-07 LAB
INR PPP: 3.2
POC INR: 3.2 (ref 0.8–1.3)

## 2019-08-07 PROCEDURE — 85610 PROTHROMBIN TIME: CPT

## 2019-08-14 RX ORDER — SIMVASTATIN 40 MG
TABLET ORAL
Qty: 90 TABLET | Refills: 2 | Status: SHIPPED | OUTPATIENT
Start: 2019-08-14 | End: 2019-09-24 | Stop reason: SDUPTHER

## 2019-08-14 RX ORDER — SIMVASTATIN 40 MG
40 TABLET ORAL NIGHTLY
Qty: 90 TABLET | OUTPATIENT
Start: 2019-08-14

## 2019-08-27 ENCOUNTER — APPOINTMENT (OUTPATIENT)
Dept: GENERAL RADIOLOGY | Facility: HOSPITAL | Age: 74
End: 2019-08-27
Payer: MEDICARE

## 2019-08-27 ENCOUNTER — HOSPITAL ENCOUNTER (EMERGENCY)
Facility: HOSPITAL | Age: 74
Discharge: HOME | End: 2019-08-27
Attending: EMERGENCY MEDICINE
Payer: MEDICARE

## 2019-08-27 ENCOUNTER — APPOINTMENT (OUTPATIENT)
Dept: CT IMAGING | Facility: HOSPITAL | Age: 74
End: 2019-08-27
Attending: EMERGENCY MEDICINE
Payer: MEDICARE

## 2019-08-27 ENCOUNTER — ANTI-COAG (OUTPATIENT)
Dept: CARDIOLOGY | Age: 74
End: 2019-08-27

## 2019-08-27 VITALS
HEART RATE: 74 BPM | RESPIRATION RATE: 16 BRPM | SYSTOLIC BLOOD PRESSURE: 164 MMHG | TEMPERATURE: 98 F | BODY MASS INDEX: 24.75 KG/M2 | HEIGHT: 64 IN | WEIGHT: 145 LBS | OXYGEN SATURATION: 95 % | DIASTOLIC BLOOD PRESSURE: 81 MMHG

## 2019-08-27 DIAGNOSIS — S53.401A SPRAIN OF RIGHT ELBOW, INITIAL ENCOUNTER: ICD-10-CM

## 2019-08-27 DIAGNOSIS — I48.0 PAROXYSMAL ATRIAL FIBRILLATION (CMD): ICD-10-CM

## 2019-08-27 DIAGNOSIS — I48.92 ATRIAL FLUTTER, UNSPECIFIED TYPE (CMD): ICD-10-CM

## 2019-08-27 DIAGNOSIS — S80.02XA CONTUSION OF LEFT KNEE, INITIAL ENCOUNTER: ICD-10-CM
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
## 2019-08-27 LAB
INR BLD: 2.39 (ref 0.9–1.1)
INR PPP: 2.4
PSA SERPL DL<=0.01 NG/ML-MCNC: 27.6 SECONDS (ref 12.5–14.7)

## 2019-08-27 PROCEDURE — 36415 COLL VENOUS BLD VENIPUNCTURE: CPT

## 2019-08-27 PROCEDURE — 85610 PROTHROMBIN TIME: CPT | Performed by: EMERGENCY MEDICINE

## 2019-08-27 PROCEDURE — 99284 EMERGENCY DEPT VISIT MOD MDM: CPT

## 2019-08-27 PROCEDURE — 73080 X-RAY EXAM OF ELBOW: CPT | Performed by: EMERGENCY MEDICINE

## 2019-08-27 PROCEDURE — 70450 CT HEAD/BRAIN W/O DYE: CPT | Performed by: EMERGENCY MEDICINE

## 2019-08-27 PROCEDURE — 73560 X-RAY EXAM OF KNEE 1 OR 2: CPT | Performed by: EMERGENCY MEDICINE

## 2019-08-27 RX ORDER — ACETAMINOPHEN 500 MG
1000 TABLET ORAL ONCE
Status: COMPLETED | OUTPATIENT
Start: 2019-08-27 | End: 2019-08-27

## 2019-08-27 NOTE — ED PROVIDER NOTES
Patient Seen in: [facility] Emergency Department    History   Patient presents with:  Fall (musculoskeletal, neurologic)    Stated Complaint: fall/on coumadin/no head injury    HPI    71-year-old with history of atrial fibrillation on Coumadin prese HISTORY  3/1/2013    re-excision left lumpectomy   • RADIATION LEFT  2013                    Social History    Tobacco Use      Smoking status: Former Smoker        Types: Cigarettes        Quit date: 1988        Years since quittin.6      Smok - Abnormal; Notable for the following components:       Result Value    PT 27.6 (*)     INR 2.39 (*)     All other components within normal limits     CT brain: No acute intracranial abnormality or skull fracture  Right elbow: Mild joint effusion, no fract

## 2019-08-27 NOTE — ED INITIAL ASSESSMENT (HPI)
Patient tripped at home while walking from carpet to [employer] injuring her left knee and right elbow today at 1630. Patient is on coumadin, denies hitting her head.

## 2019-08-31 ENCOUNTER — SNF/IP PROF CHARGE ONLY (OUTPATIENT)
Dept: HEMATOLOGY/ONCOLOGY | Facility: HOSPITAL | Age: 74
End: 2019-08-31

## 2019-08-31 DIAGNOSIS — C50.912 MALIGNANT NEOPLASM OF LEFT BREAST IN FEMALE, ESTROGEN RECEPTOR POSITIVE, UNSPECIFIED SITE OF BREAST (HCC): ICD-10-CM

## 2019-08-31 DIAGNOSIS — Z17.0 MALIGNANT NEOPLASM OF LEFT BREAST IN FEMALE, ESTROGEN RECEPTOR POSITIVE, UNSPECIFIED SITE OF BREAST (HCC): ICD-10-CM

## 2019-08-31 PROCEDURE — G9678 ONCOLOGY CARE MODEL SERVICE: HCPCS | Performed by: INTERNAL MEDICINE

## 2019-09-03 DIAGNOSIS — C50.919 BREAST CANCER (HCC): ICD-10-CM

## 2019-09-03 RX ORDER — ANASTROZOLE 1 MG/1
TABLET ORAL
Qty: 90 TABLET | Refills: 0 | Status: SHIPPED | OUTPATIENT
Start: 2019-09-03 | End: 2019-11-30

## 2019-09-03 RX ORDER — RAMIPRIL 5 MG/1
CAPSULE ORAL
Qty: 90 CAPSULE | Refills: 0 | Status: SHIPPED | OUTPATIENT
Start: 2019-09-03 | End: 2019-09-24 | Stop reason: SDUPTHER

## 2019-09-11 ENCOUNTER — HOSPITAL ENCOUNTER (OUTPATIENT)
Dept: LAB | Facility: HOSPITAL | Age: 74
Discharge: HOME | End: 2019-09-11
Attending: INTERNAL MEDICINE
Payer: MEDICARE

## 2019-09-11 DIAGNOSIS — E03.9 HYPOTHYROIDISM, UNSPECIFIED TYPE: ICD-10-CM

## 2019-09-11 LAB
T4 FREE SERPL-MCNC: 1.3 NG/DL (ref 0.8–1.7)
TSI SER-ACNC: 1.54 MIU/ML (ref 0.36–3.74)

## 2019-09-11 PROCEDURE — 36415 COLL VENOUS BLD VENIPUNCTURE: CPT

## 2019-09-11 PROCEDURE — 84443 ASSAY THYROID STIM HORMONE: CPT

## 2019-09-11 PROCEDURE — 84439 ASSAY OF FREE THYROXINE: CPT

## 2019-09-24 ENCOUNTER — ANTI-COAG (OUTPATIENT)
Dept: CARDIOLOGY | Age: 74
End: 2019-09-24

## 2019-09-24 ENCOUNTER — OFFICE VISIT (OUTPATIENT)
Dept: CARDIOLOGY | Age: 74
End: 2019-09-24

## 2019-09-24 ENCOUNTER — HOSPITAL ENCOUNTER (OUTPATIENT)
Dept: LAB | Facility: HOSPITAL | Age: 74
Discharge: HOME | End: 2019-09-24
Attending: INTERNAL MEDICINE
Payer: MEDICARE

## 2019-09-24 VITALS
SYSTOLIC BLOOD PRESSURE: 98 MMHG | DIASTOLIC BLOOD PRESSURE: 68 MMHG | WEIGHT: 138 LBS | HEIGHT: 64 IN | HEART RATE: 68 BPM | BODY MASS INDEX: 23.56 KG/M2

## 2019-09-24 DIAGNOSIS — E03.9 HYPOTHYROIDISM, UNSPECIFIED TYPE: ICD-10-CM

## 2019-09-24 DIAGNOSIS — I48.0 PAROXYSMAL ATRIAL FIBRILLATION (CMD): ICD-10-CM

## 2019-09-24 DIAGNOSIS — I71.20 THORACIC AORTIC ANEURYSM WITHOUT RUPTURE (CMD): ICD-10-CM

## 2019-09-24 DIAGNOSIS — E78.00 PURE HYPERCHOLESTEROLEMIA: ICD-10-CM

## 2019-09-24 DIAGNOSIS — I25.10 CORONARY ARTERY DISEASE INVOLVING NATIVE CORONARY ARTERY OF NATIVE HEART WITHOUT ANGINA PECTORIS: ICD-10-CM

## 2019-09-24 DIAGNOSIS — I48.92 ATRIAL FLUTTER, UNSPECIFIED TYPE (CMD): ICD-10-CM

## 2019-09-24 DIAGNOSIS — Z95.1 HX OF CABG: Primary | ICD-10-CM

## 2019-09-24 DIAGNOSIS — I10 ESSENTIAL HYPERTENSION: ICD-10-CM

## 2019-09-24 DIAGNOSIS — I48.0 PAROXYSMAL ATRIAL FIBRILLATION (HCC): ICD-10-CM

## 2019-09-24 LAB
INR PPP: 2.9
POC INR: 2.9 (ref 0.8–1.3)

## 2019-09-24 PROCEDURE — 99214 OFFICE O/P EST MOD 30 MIN: CPT | Performed by: INTERNAL MEDICINE

## 2019-09-24 PROCEDURE — 85610 PROTHROMBIN TIME: CPT

## 2019-09-24 RX ORDER — AMIODARONE HYDROCHLORIDE 100 MG/1
100 TABLET ORAL DAILY
COMMUNITY

## 2019-09-24 RX ORDER — SIMVASTATIN 40 MG
40 TABLET ORAL AT BEDTIME
Qty: 90 TABLET | Refills: 3 | Status: SHIPPED | OUTPATIENT
Start: 2019-09-24 | End: 2021-02-08

## 2019-09-24 RX ORDER — RAMIPRIL 5 MG/1
5 CAPSULE ORAL DAILY
Qty: 90 CAPSULE | Refills: 3 | Status: SHIPPED | OUTPATIENT
Start: 2019-09-24 | End: 2020-11-12

## 2019-09-24 SDOH — HEALTH STABILITY: MENTAL HEALTH: HOW OFTEN DO YOU HAVE A DRINK CONTAINING ALCOHOL?: NEVER

## 2019-09-30 ENCOUNTER — SNF/IP PROF CHARGE ONLY (OUTPATIENT)
Dept: HEMATOLOGY/ONCOLOGY | Facility: HOSPITAL | Age: 74
End: 2019-09-30

## 2019-09-30 DIAGNOSIS — C50.912 MALIGNANT NEOPLASM OF LEFT BREAST IN FEMALE, ESTROGEN RECEPTOR POSITIVE, UNSPECIFIED SITE OF BREAST (HCC): ICD-10-CM

## 2019-09-30 DIAGNOSIS — Z17.0 MALIGNANT NEOPLASM OF LEFT BREAST IN FEMALE, ESTROGEN RECEPTOR POSITIVE, UNSPECIFIED SITE OF BREAST (HCC): ICD-10-CM

## 2019-09-30 PROCEDURE — G9678 ONCOLOGY CARE MODEL SERVICE: HCPCS | Performed by: INTERNAL MEDICINE

## 2019-10-23 ENCOUNTER — ANTI-COAG (OUTPATIENT)
Dept: CARDIOLOGY | Age: 74
End: 2019-10-23

## 2019-10-23 ENCOUNTER — HOSPITAL ENCOUNTER (OUTPATIENT)
Dept: LAB | Facility: HOSPITAL | Age: 74
Discharge: HOME | End: 2019-10-23
Attending: INTERNAL MEDICINE
Payer: MEDICARE

## 2019-10-23 DIAGNOSIS — I48.0 PAROXYSMAL ATRIAL FIBRILLATION (CMD): ICD-10-CM

## 2019-10-23 DIAGNOSIS — I48.92 ATRIAL FLUTTER, UNSPECIFIED TYPE (CMD): ICD-10-CM

## 2019-10-23 DIAGNOSIS — I48.0 PAROXYSMAL ATRIAL FIBRILLATION (HCC): ICD-10-CM

## 2019-10-23 LAB — INR PPP: 2.8

## 2019-10-23 PROCEDURE — 85610 PROTHROMBIN TIME: CPT

## 2019-10-30 ENCOUNTER — HOSPITAL ENCOUNTER (OUTPATIENT)
Dept: MAMMOGRAPHY | Facility: HOSPITAL | Age: 74
Discharge: HOME | End: 2019-10-30
Attending: INTERNAL MEDICINE
Payer: MEDICARE

## 2019-10-30 DIAGNOSIS — Z17.0 MALIGNANT NEOPLASM OF LEFT BREAST IN FEMALE, ESTROGEN RECEPTOR POSITIVE, UNSPECIFIED SITE OF BREAST (HCC): ICD-10-CM

## 2019-10-30 DIAGNOSIS — C50.912 MALIGNANT NEOPLASM OF LEFT BREAST IN FEMALE, ESTROGEN RECEPTOR POSITIVE, UNSPECIFIED SITE OF BREAST (HCC): ICD-10-CM

## 2019-10-30 PROCEDURE — 77062 BREAST TOMOSYNTHESIS BI: CPT | Performed by: INTERNAL MEDICINE

## 2019-10-30 PROCEDURE — 77066 DX MAMMO INCL CAD BI: CPT | Performed by: INTERNAL MEDICINE

## 2019-10-31 ENCOUNTER — SNF/IP PROF CHARGE ONLY (OUTPATIENT)
Dept: HEMATOLOGY/ONCOLOGY | Facility: HOSPITAL | Age: 74
End: 2019-10-31

## 2019-10-31 DIAGNOSIS — C50.912 MALIGNANT NEOPLASM OF LEFT BREAST IN FEMALE, ESTROGEN RECEPTOR POSITIVE, UNSPECIFIED SITE OF BREAST (HCC): ICD-10-CM

## 2019-10-31 DIAGNOSIS — Z17.0 MALIGNANT NEOPLASM OF LEFT BREAST IN FEMALE, ESTROGEN RECEPTOR POSITIVE, UNSPECIFIED SITE OF BREAST (HCC): ICD-10-CM

## 2019-10-31 PROCEDURE — G9678 ONCOLOGY CARE MODEL SERVICE: HCPCS | Performed by: INTERNAL MEDICINE

## 2019-11-11 RX ORDER — WARFARIN SODIUM 5 MG/1
TABLET ORAL
Qty: 90 TABLET | Refills: 1 | Status: SHIPPED | OUTPATIENT
Start: 2019-11-11 | End: 2020-10-26

## 2019-11-11 NOTE — PROGRESS NOTES
[facility] Progress Note      Patient Name:  Jean Alatorre  YOB: 1945  Medical Record Number:  [id number]    Date of visit: 11/12/2019    CHIEF COMPLAINT: Stage IIA left breast carcinoma, status post lumpectomy and radiation.     HP 600 PLUS-VIT D OR) Take 600 mg by mouth 2 (two) times daily. • Mometasone Furoate 0.1 % External Cream Apply to AAs of face BID x1 week. Hold x1 week. Repeat PRN with flares.  15 g 3   • ramipril (ALTACE) 5 MG Oral Cap Take 1 capsule (5 mg total) by m HER2 negative. Continues to tolerate anastrozole well. Given +LN, recommend 10 years of endocrine therapy. Plan to complete 3/23. Mammogram 10/19 ok, next due 10/20.      # Osteopenia:  DEXA 10/18 showed osteopenia with T score -1.3 is better than DEXA 1

## 2019-11-12 ENCOUNTER — OFFICE VISIT (OUTPATIENT)
Dept: HEMATOLOGY/ONCOLOGY | Facility: HOSPITAL | Age: 74
End: 2019-11-12
Attending: INTERNAL MEDICINE
Payer: MEDICARE

## 2019-11-12 VITALS
SYSTOLIC BLOOD PRESSURE: 136 MMHG | BODY MASS INDEX: 23.22 KG/M2 | HEIGHT: 64.02 IN | OXYGEN SATURATION: 93 % | DIASTOLIC BLOOD PRESSURE: 72 MMHG | TEMPERATURE: 98 F | RESPIRATION RATE: 18 BRPM | HEART RATE: 55 BPM | WEIGHT: 136 LBS

## 2019-11-12 DIAGNOSIS — C50.912 MALIGNANT NEOPLASM OF LEFT BREAST IN FEMALE, ESTROGEN RECEPTOR POSITIVE, UNSPECIFIED SITE OF BREAST (HCC): Primary | ICD-10-CM

## 2019-11-12 DIAGNOSIS — Z17.0 MALIGNANT NEOPLASM OF LEFT BREAST IN FEMALE, ESTROGEN RECEPTOR POSITIVE, UNSPECIFIED SITE OF BREAST (HCC): Primary | ICD-10-CM

## 2019-11-12 PROCEDURE — 99213 OFFICE O/P EST LOW 20 MIN: CPT | Performed by: INTERNAL MEDICINE

## 2019-11-12 NOTE — PROGRESS NOTES
MD f/u for BEATRIZ ca. Continues on anastrozole without issue or complaint. Reports that she is doing well.      Education Record    Learner:  Patient, spouse    Barriers / Limitations:  None   Comments:    Method:  Discussion   Comments:    General Topics:  Dory

## 2019-11-22 ENCOUNTER — HOSPITAL ENCOUNTER (OUTPATIENT)
Dept: LAB | Facility: HOSPITAL | Age: 74
Discharge: HOME | End: 2019-11-22
Attending: INTERNAL MEDICINE
Payer: MEDICARE

## 2019-11-22 ENCOUNTER — ANTI-COAG (OUTPATIENT)
Dept: CARDIOLOGY | Age: 74
End: 2019-11-22

## 2019-11-22 DIAGNOSIS — I48.0 PAROXYSMAL ATRIAL FIBRILLATION (CMD): ICD-10-CM

## 2019-11-22 DIAGNOSIS — I48.0 PAROXYSMAL ATRIAL FIBRILLATION (HCC): ICD-10-CM

## 2019-11-22 DIAGNOSIS — I48.92 ATRIAL FLUTTER, UNSPECIFIED TYPE (CMD): ICD-10-CM

## 2019-11-22 LAB — INR PPP: 2.6

## 2019-11-22 PROCEDURE — 85610 PROTHROMBIN TIME: CPT

## 2019-11-30 ENCOUNTER — SNF/IP PROF CHARGE ONLY (OUTPATIENT)
Dept: HEMATOLOGY/ONCOLOGY | Facility: HOSPITAL | Age: 74
End: 2019-11-30

## 2019-11-30 DIAGNOSIS — C50.912 MALIGNANT NEOPLASM OF LEFT BREAST IN FEMALE, ESTROGEN RECEPTOR POSITIVE, UNSPECIFIED SITE OF BREAST (HCC): ICD-10-CM

## 2019-11-30 DIAGNOSIS — Z17.0 MALIGNANT NEOPLASM OF LEFT BREAST IN FEMALE, ESTROGEN RECEPTOR POSITIVE, UNSPECIFIED SITE OF BREAST (HCC): ICD-10-CM

## 2019-11-30 DIAGNOSIS — C50.919 BREAST CANCER (HCC): ICD-10-CM

## 2019-11-30 PROCEDURE — G9678 ONCOLOGY CARE MODEL SERVICE: HCPCS | Performed by: INTERNAL MEDICINE

## 2019-12-01 RX ORDER — ANASTROZOLE 1 MG/1
TABLET ORAL
Qty: 90 TABLET | Refills: 3 | Status: SHIPPED | OUTPATIENT
Start: 2019-12-01 | End: 2021-04-23

## 2019-12-18 ENCOUNTER — ANTI-COAG (OUTPATIENT)
Dept: CARDIOLOGY | Age: 74
End: 2019-12-18

## 2019-12-18 ENCOUNTER — HOSPITAL ENCOUNTER (OUTPATIENT)
Dept: LAB | Facility: HOSPITAL | Age: 74
Discharge: HOME | End: 2019-12-18
Attending: INTERNAL MEDICINE
Payer: MEDICARE

## 2019-12-18 DIAGNOSIS — I48.0 PAROXYSMAL ATRIAL FIBRILLATION (CMD): ICD-10-CM

## 2019-12-18 DIAGNOSIS — I48.92 ATRIAL FLUTTER, UNSPECIFIED TYPE (CMD): ICD-10-CM

## 2019-12-18 DIAGNOSIS — I48.0 PAROXYSMAL ATRIAL FIBRILLATION (HCC): ICD-10-CM

## 2019-12-18 LAB — INR PPP: 2.7

## 2019-12-18 PROCEDURE — 85610 PROTHROMBIN TIME: CPT

## 2020-01-15 ENCOUNTER — EMPLOYEE HEALTH (OUTPATIENT)
Dept: OTHER | Facility: HOSPITAL | Age: 75
End: 2020-01-15
Attending: INTERNAL MEDICINE
Payer: MEDICARE

## 2020-01-15 ENCOUNTER — HOSPITAL ENCOUNTER (OUTPATIENT)
Dept: LAB | Facility: HOSPITAL | Age: 75
Discharge: HOME | End: 2020-01-15
Attending: INTERNAL MEDICINE
Payer: MEDICARE

## 2020-01-15 ENCOUNTER — ANTI-COAG (OUTPATIENT)
Dept: CARDIOLOGY | Age: 75
End: 2020-01-15

## 2020-01-15 DIAGNOSIS — I48.0 PAROXYSMAL ATRIAL FIBRILLATION (HCC): ICD-10-CM

## 2020-01-15 DIAGNOSIS — Z11.1 SCREENING-PULMONARY TB: Primary | ICD-10-CM

## 2020-01-15 DIAGNOSIS — I48.0 PAROXYSMAL ATRIAL FIBRILLATION (CMD): ICD-10-CM

## 2020-01-15 DIAGNOSIS — I48.92 ATRIAL FLUTTER, UNSPECIFIED TYPE (CMD): ICD-10-CM

## 2020-01-15 LAB
INR PPP: 2.6
POC INR: 2.6 (ref 0.8–1.3)

## 2020-01-15 PROCEDURE — 85610 PROTHROMBIN TIME: CPT

## 2020-01-15 PROCEDURE — 86480 TB TEST CELL IMMUN MEASURE: CPT

## 2020-01-17 LAB
M TB TUBERC IFN-G BLD QL: NEGATIVE
M TB TUBERC IFN-G/MITOGEN IGNF BLD: 0.09 IU/ML
M TB TUBERC IFN-G/MITOGEN IGNF BLD: 0.12 IU/ML
M TB TUBERC IGNF/MITOGEN IGNF CONTROL: 9.78 IU/ML
MITOGEN IGNF BCKGRD COR BLD-ACNC: 0.03 IU/ML

## 2020-01-22 ENCOUNTER — HOSPITAL ENCOUNTER (OUTPATIENT)
Dept: LAB | Facility: HOSPITAL | Age: 75
Discharge: HOME | End: 2020-01-22
Attending: INTERNAL MEDICINE
Payer: MEDICARE

## 2020-01-22 LAB
ALBUMIN SERPL-MCNC: 3.7 G/DL (ref 3.4–5)
ALBUMIN/GLOB SERPL: 0.9 {RATIO} (ref 1–2)
ALP LIVER SERPL-CCNC: 91 U/L (ref 55–142)
ALT SERPL-CCNC: 19 U/L (ref 13–56)
ANION GAP SERPL CALC-SCNC: 6 MMOL/L (ref 0–18)
AST SERPL-CCNC: 16 U/L (ref 15–37)
BASOPHILS # BLD AUTO: 0.03 X10(3) UL (ref 0–0.2)
BASOPHILS NFR BLD AUTO: 0.9 %
BILIRUB SERPL-MCNC: 0.5 MG/DL (ref 0.1–2)
BUN BLD-MCNC: 19 MG/DL (ref 7–18)
BUN/CREAT SERPL: 21.1 (ref 10–20)
CALCIUM BLD-MCNC: 9.7 MG/DL (ref 8.5–10.1)
CHLORIDE SERPL-SCNC: 110 MMOL/L (ref 98–112)
CHOLEST SMN-MCNC: 173 MG/DL (ref ?–200)
CO2 SERPL-SCNC: 26 MMOL/L (ref 21–32)
CREAT BLD-MCNC: 0.9 MG/DL (ref 0.55–1.02)
DEPRECATED RDW RBC AUTO: 41.2 FL (ref 35.1–46.3)
EOSINOPHIL # BLD AUTO: 0.03 X10(3) UL (ref 0–0.7)
EOSINOPHIL NFR BLD AUTO: 0.9 %
ERYTHROCYTE [DISTWIDTH] IN BLOOD BY AUTOMATED COUNT: 12.4 % (ref 11–15)
GLOBULIN PLAS-MCNC: 4.3 G/DL (ref 2.8–4.4)
GLUCOSE BLD-MCNC: 94 MG/DL (ref 70–99)
HCT VFR BLD AUTO: 40.5 % (ref 35–48)
HDLC SERPL-MCNC: 99 MG/DL (ref 40–59)
HGB BLD-MCNC: 13.2 G/DL (ref 12–16)
IMM GRANULOCYTES # BLD AUTO: 0.02 X10(3) UL (ref 0–1)
IMM GRANULOCYTES NFR BLD: 0.6 %
LDLC SERPL CALC-MCNC: 59 MG/DL (ref ?–100)
LYMPHOCYTES # BLD AUTO: 1.58 X10(3) UL (ref 1–4)
LYMPHOCYTES NFR BLD AUTO: 47.3 %
M PROTEIN MFR SERPL ELPH: 8 G/DL (ref 6.4–8.2)
MCH RBC QN AUTO: 29.5 PG (ref 26–34)
MCHC RBC AUTO-ENTMCNC: 32.6 G/DL (ref 31–37)
MCV RBC AUTO: 90.4 FL (ref 80–100)
MONOCYTES # BLD AUTO: 0.63 X10(3) UL (ref 0.1–1)
MONOCYTES NFR BLD AUTO: 18.9 %
NEUTROPHILS # BLD AUTO: 1.05 X10 (3) UL (ref 1.5–7.7)
NEUTROPHILS # BLD AUTO: 1.05 X10(3) UL (ref 1.5–7.7)
NEUTROPHILS NFR BLD AUTO: 31.4 %
NONHDLC SERPL-MCNC: 74 MG/DL (ref ?–130)
OSMOLALITY SERPL CALC.SUM OF ELEC: 296 MOSM/KG (ref 275–295)
PATIENT FASTING Y/N/NP: YES
PATIENT FASTING Y/N/NP: YES
PLATELET # BLD AUTO: 174 10(3)UL (ref 150–450)
POTASSIUM SERPL-SCNC: 4 MMOL/L (ref 3.5–5.1)
RBC # BLD AUTO: 4.48 X10(6)UL (ref 3.8–5.3)
SODIUM SERPL-SCNC: 142 MMOL/L (ref 136–145)
TRIGL SERPL-MCNC: 73 MG/DL (ref 30–149)
VLDLC SERPL CALC-MCNC: 15 MG/DL (ref 0–30)
WBC # BLD AUTO: 3.3 X10(3) UL (ref 4–11)

## 2020-01-22 PROCEDURE — 80053 COMPREHEN METABOLIC PANEL: CPT | Performed by: INTERNAL MEDICINE

## 2020-01-22 PROCEDURE — 36415 COLL VENOUS BLD VENIPUNCTURE: CPT | Performed by: INTERNAL MEDICINE

## 2020-01-22 PROCEDURE — 85025 COMPLETE CBC W/AUTO DIFF WBC: CPT | Performed by: INTERNAL MEDICINE

## 2020-01-22 PROCEDURE — 80061 LIPID PANEL: CPT | Performed by: INTERNAL MEDICINE

## 2020-01-23 ENCOUNTER — TELEPHONE (OUTPATIENT)
Dept: CARDIOLOGY | Age: 75
End: 2020-01-23

## 2020-02-12 ENCOUNTER — HOSPITAL ENCOUNTER (OUTPATIENT)
Dept: LAB | Facility: HOSPITAL | Age: 75
Discharge: HOME | End: 2020-02-12
Attending: INTERNAL MEDICINE
Payer: MEDICARE

## 2020-02-12 ENCOUNTER — ANTI-COAG (OUTPATIENT)
Dept: CARDIOLOGY | Age: 75
End: 2020-02-12

## 2020-02-12 DIAGNOSIS — I48.0 PAROXYSMAL ATRIAL FIBRILLATION (CMD): ICD-10-CM

## 2020-02-12 DIAGNOSIS — I48.92 ATRIAL FLUTTER, UNSPECIFIED TYPE (CMD): ICD-10-CM

## 2020-02-12 DIAGNOSIS — I48.0 PAROXYSMAL ATRIAL FIBRILLATION (HCC): ICD-10-CM

## 2020-02-12 LAB
INR PPP: 3.1 (ref 2–3)
POC INR: 3.1 (ref 0.8–1.3)

## 2020-02-12 PROCEDURE — 85610 PROTHROMBIN TIME: CPT

## 2020-02-26 ENCOUNTER — HOSPITAL ENCOUNTER (OUTPATIENT)
Dept: LAB | Facility: HOSPITAL | Age: 75
Discharge: HOME | End: 2020-02-26
Attending: INTERNAL MEDICINE
Payer: MEDICARE

## 2020-02-26 ENCOUNTER — ANTI-COAG (OUTPATIENT)
Dept: CARDIOLOGY | Age: 75
End: 2020-02-26

## 2020-02-26 DIAGNOSIS — I48.92 ATRIAL FLUTTER, UNSPECIFIED TYPE (CMD): ICD-10-CM

## 2020-02-26 DIAGNOSIS — I48.0 PAROXYSMAL ATRIAL FIBRILLATION (HCC): ICD-10-CM

## 2020-02-26 DIAGNOSIS — I48.0 PAROXYSMAL ATRIAL FIBRILLATION (CMD): ICD-10-CM

## 2020-02-26 LAB
INR PPP: 3.1 (ref 2–3)
POC INR: 3.1 (ref 0.8–1.3)

## 2020-02-26 PROCEDURE — 85610 PROTHROMBIN TIME: CPT

## 2020-03-10 ENCOUNTER — OFFICE VISIT (OUTPATIENT)
Dept: CARDIOLOGY | Age: 75
End: 2020-03-10

## 2020-03-10 VITALS
DIASTOLIC BLOOD PRESSURE: 82 MMHG | HEIGHT: 64 IN | HEART RATE: 56 BPM | BODY MASS INDEX: 23.05 KG/M2 | SYSTOLIC BLOOD PRESSURE: 128 MMHG | WEIGHT: 135 LBS

## 2020-03-10 DIAGNOSIS — Z95.1 HX OF CABG: ICD-10-CM

## 2020-03-10 DIAGNOSIS — E78.00 PURE HYPERCHOLESTEROLEMIA: ICD-10-CM

## 2020-03-10 DIAGNOSIS — I48.0 PAROXYSMAL ATRIAL FIBRILLATION (CMD): Primary | ICD-10-CM

## 2020-03-10 DIAGNOSIS — I25.10 CORONARY ARTERY DISEASE INVOLVING NATIVE CORONARY ARTERY OF NATIVE HEART WITHOUT ANGINA PECTORIS: ICD-10-CM

## 2020-03-10 DIAGNOSIS — I71.20 THORACIC AORTIC ANEURYSM WITHOUT RUPTURE (CMD): ICD-10-CM

## 2020-03-10 DIAGNOSIS — I48.92 ATRIAL FLUTTER, UNSPECIFIED TYPE (CMD): ICD-10-CM

## 2020-03-10 DIAGNOSIS — I10 ESSENTIAL HYPERTENSION: ICD-10-CM

## 2020-03-10 PROCEDURE — 99214 OFFICE O/P EST MOD 30 MIN: CPT | Performed by: INTERNAL MEDICINE

## 2020-03-10 RX ORDER — MOMETASONE FUROATE 1 MG/G
CREAM TOPICAL
COMMUNITY
Start: 2017-12-04

## 2020-03-10 SDOH — HEALTH STABILITY: PHYSICAL HEALTH: ON AVERAGE, HOW MANY MINUTES DO YOU ENGAGE IN EXERCISE AT THIS LEVEL?: 0 MIN

## 2020-03-10 SDOH — SOCIAL STABILITY: SOCIAL NETWORK: ARE YOU MARRIED, WIDOWED, DIVORCED, SEPARATED, NEVER MARRIED, OR LIVING WITH A PARTNER?: MARRIED

## 2020-03-10 SDOH — HEALTH STABILITY: PHYSICAL HEALTH: ON AVERAGE, HOW MANY DAYS PER WEEK DO YOU ENGAGE IN MODERATE TO STRENUOUS EXERCISE (LIKE A BRISK WALK)?: 0 DAYS

## 2020-03-10 SDOH — HEALTH STABILITY: MENTAL HEALTH: HOW OFTEN DO YOU HAVE A DRINK CONTAINING ALCOHOL?: NEVER

## 2020-03-10 ASSESSMENT — PATIENT HEALTH QUESTIONNAIRE - PHQ9
1. LITTLE INTEREST OR PLEASURE IN DOING THINGS: NOT AT ALL
SUM OF ALL RESPONSES TO PHQ9 QUESTIONS 1 AND 2: 0
SUM OF ALL RESPONSES TO PHQ9 QUESTIONS 1 AND 2: 0
2. FEELING DOWN, DEPRESSED OR HOPELESS: NOT AT ALL

## 2020-03-11 ENCOUNTER — ANTI-COAG (OUTPATIENT)
Dept: CARDIOLOGY | Age: 75
End: 2020-03-11

## 2020-03-11 ENCOUNTER — HOSPITAL ENCOUNTER (OUTPATIENT)
Dept: LAB | Facility: HOSPITAL | Age: 75
Discharge: HOME | End: 2020-03-11
Attending: INTERNAL MEDICINE
Payer: MEDICARE

## 2020-03-11 DIAGNOSIS — I48.0 PAROXYSMAL ATRIAL FIBRILLATION (HCC): ICD-10-CM

## 2020-03-11 DIAGNOSIS — I48.92 ATRIAL FLUTTER, UNSPECIFIED TYPE (CMD): ICD-10-CM

## 2020-03-11 DIAGNOSIS — I48.0 PAROXYSMAL ATRIAL FIBRILLATION (CMD): ICD-10-CM

## 2020-03-11 LAB
INR PPP: 2.5
POC INR: 2.5 (ref 0.8–1.3)

## 2020-03-11 PROCEDURE — 85610 PROTHROMBIN TIME: CPT

## 2020-03-18 ENCOUNTER — HOSPITAL ENCOUNTER (OUTPATIENT)
Dept: LAB | Facility: HOSPITAL | Age: 75
Discharge: HOME | End: 2020-03-18
Attending: INTERNAL MEDICINE
Payer: MEDICARE

## 2020-03-18 DIAGNOSIS — E03.9 HYPOTHYROIDISM, UNSPECIFIED TYPE: ICD-10-CM

## 2020-03-18 LAB
T4 FREE SERPL-MCNC: 1.3 NG/DL (ref 0.8–1.7)
TSI SER-ACNC: 2.22 MIU/ML (ref 0.36–3.74)

## 2020-03-18 PROCEDURE — 84443 ASSAY THYROID STIM HORMONE: CPT

## 2020-03-18 PROCEDURE — 36415 COLL VENOUS BLD VENIPUNCTURE: CPT

## 2020-03-18 PROCEDURE — 84439 ASSAY OF FREE THYROXINE: CPT

## 2020-04-08 ENCOUNTER — HOSPITAL ENCOUNTER (OUTPATIENT)
Dept: LAB | Facility: HOSPITAL | Age: 75
Discharge: HOME | End: 2020-04-08
Attending: INTERNAL MEDICINE
Payer: MEDICARE

## 2020-04-08 ENCOUNTER — ANTI-COAG (OUTPATIENT)
Dept: CARDIOLOGY | Age: 75
End: 2020-04-08

## 2020-04-08 DIAGNOSIS — I48.0 PAROXYSMAL ATRIAL FIBRILLATION (HCC): ICD-10-CM

## 2020-04-08 DIAGNOSIS — I48.0 PAROXYSMAL ATRIAL FIBRILLATION (CMD): ICD-10-CM

## 2020-04-08 DIAGNOSIS — I48.92 ATRIAL FLUTTER, UNSPECIFIED TYPE (CMD): ICD-10-CM

## 2020-04-08 LAB — INR PPP: 2.5

## 2020-04-08 PROCEDURE — 85610 PROTHROMBIN TIME: CPT

## 2020-04-15 ENCOUNTER — TELEPHONE (OUTPATIENT)
Dept: HEMATOLOGY/ONCOLOGY | Facility: HOSPITAL | Age: 75
End: 2020-04-15

## 2020-04-15 NOTE — TELEPHONE ENCOUNTER
Spoke with patient regarding rescheduling her follow up due to the corona virus. Pt verbalized understanding, and is agreeable with the plan. appt rescheduled.

## 2020-05-06 ENCOUNTER — HOSPITAL ENCOUNTER (OUTPATIENT)
Dept: LAB | Facility: HOSPITAL | Age: 75
Discharge: HOME | End: 2020-05-06
Attending: INTERNAL MEDICINE
Payer: MEDICARE

## 2020-05-06 ENCOUNTER — ANTI-COAG (OUTPATIENT)
Dept: CARDIOLOGY | Age: 75
End: 2020-05-06

## 2020-05-06 DIAGNOSIS — Z79.01 LONG TERM (CURRENT) USE OF ANTICOAGULANTS: ICD-10-CM

## 2020-05-06 DIAGNOSIS — I48.0 PAROXYSMAL ATRIAL FIBRILLATION (CMD): ICD-10-CM

## 2020-05-06 DIAGNOSIS — I48.92 ATRIAL FLUTTER, UNSPECIFIED TYPE (CMD): ICD-10-CM

## 2020-05-06 LAB — INR PPP: 2.1

## 2020-05-06 PROCEDURE — 85610 PROTHROMBIN TIME: CPT

## 2020-05-12 ENCOUNTER — APPOINTMENT (OUTPATIENT)
Dept: HEMATOLOGY/ONCOLOGY | Facility: HOSPITAL | Age: 75
End: 2020-05-12
Attending: INTERNAL MEDICINE
Payer: MEDICARE

## 2020-06-03 ENCOUNTER — HOSPITAL ENCOUNTER (OUTPATIENT)
Dept: LAB | Facility: HOSPITAL | Age: 75
Discharge: HOME | End: 2020-06-03
Attending: INTERNAL MEDICINE
Payer: MEDICARE

## 2020-06-03 ENCOUNTER — ANTI-COAG (OUTPATIENT)
Dept: CARDIOLOGY | Age: 75
End: 2020-06-03

## 2020-06-03 DIAGNOSIS — I48.0 PAROXYSMAL ATRIAL FIBRILLATION (CMD): ICD-10-CM

## 2020-06-03 DIAGNOSIS — I48.92 ATRIAL FLUTTER, UNSPECIFIED TYPE (CMD): ICD-10-CM

## 2020-06-03 DIAGNOSIS — Z79.01 LONG TERM (CURRENT) USE OF ANTICOAGULANTS: ICD-10-CM

## 2020-06-03 LAB — INR PPP: 2.2

## 2020-06-03 PROCEDURE — 85610 PROTHROMBIN TIME: CPT

## 2020-07-01 ENCOUNTER — ANTI-COAG (OUTPATIENT)
Dept: CARDIOLOGY | Age: 75
End: 2020-07-01

## 2020-07-01 ENCOUNTER — HOSPITAL ENCOUNTER (OUTPATIENT)
Dept: LAB | Facility: HOSPITAL | Age: 75
Discharge: HOME | End: 2020-07-01
Attending: INTERNAL MEDICINE
Payer: MEDICARE

## 2020-07-01 DIAGNOSIS — Z79.01 LONG TERM (CURRENT) USE OF ANTICOAGULANTS: ICD-10-CM

## 2020-07-01 DIAGNOSIS — I48.0 PAROXYSMAL ATRIAL FIBRILLATION (CMD): ICD-10-CM

## 2020-07-01 DIAGNOSIS — I48.92 ATRIAL FLUTTER, UNSPECIFIED TYPE (CMD): ICD-10-CM

## 2020-07-01 LAB
INR PPP: 2
POC INR: 2 (ref 0.8–1.3)

## 2020-07-01 PROCEDURE — 85610 PROTHROMBIN TIME: CPT

## 2020-07-21 NOTE — PROGRESS NOTES
[facility] Progress Note      Patient Name:  Chino Barth  YOB: 1945  Medical Record Number:  [id number]    Date of visit: 7/22/2020    CHIEF COMPLAINT: Stage IIA L breast carcinoma, s/p lumpectomy and radiation.     HPI:  77 year % External Cream Apply to AAs of face BID x1 week. Hold x1 week. Repeat PRN with flares. 15 g 3   • ramipril (ALTACE) 5 MG Oral Cap Take 1 capsule (5 mg total) by mouth daily.  30 capsule 11   • Amiodarone HCl (PACERONE) 100 MG Oral Tab Take 1 tablet (100 m 3/13, continue till 3/23. Given +LN, recommend 10 years of endocrine therapy. Mammogram 10/19 ok, next due 10/20. # Osteopenia:  DEXA 10/18 showed osteopenia with T score -1.3 is better than DEXA 10/16. Next due 10/20.       ORDERS PLACED:        XR

## 2020-07-22 ENCOUNTER — OFFICE VISIT (OUTPATIENT)
Dept: HEMATOLOGY/ONCOLOGY | Facility: HOSPITAL | Age: 75
End: 2020-07-22
Attending: INTERNAL MEDICINE
Payer: MEDICARE

## 2020-07-22 VITALS
HEIGHT: 64.02 IN | SYSTOLIC BLOOD PRESSURE: 131 MMHG | RESPIRATION RATE: 16 BRPM | BODY MASS INDEX: 22.47 KG/M2 | WEIGHT: 131.63 LBS | OXYGEN SATURATION: 95 % | HEART RATE: 56 BPM | DIASTOLIC BLOOD PRESSURE: 64 MMHG

## 2020-07-22 DIAGNOSIS — Z17.0 MALIGNANT NEOPLASM OF LEFT BREAST IN FEMALE, ESTROGEN RECEPTOR POSITIVE, UNSPECIFIED SITE OF BREAST (HCC): ICD-10-CM

## 2020-07-22 DIAGNOSIS — Z78.0 POSTMENOPAUSAL: Primary | ICD-10-CM

## 2020-07-22 DIAGNOSIS — Z17.0 MALIGNANT NEOPLASM OF LOWER-OUTER QUADRANT OF LEFT BREAST OF FEMALE, ESTROGEN RECEPTOR POSITIVE (HCC): ICD-10-CM

## 2020-07-22 DIAGNOSIS — C50.912 MALIGNANT NEOPLASM OF LEFT BREAST IN FEMALE, ESTROGEN RECEPTOR POSITIVE, UNSPECIFIED SITE OF BREAST (HCC): ICD-10-CM

## 2020-07-22 DIAGNOSIS — C50.512 MALIGNANT NEOPLASM OF LOWER-OUTER QUADRANT OF LEFT BREAST OF FEMALE, ESTROGEN RECEPTOR POSITIVE (HCC): ICD-10-CM

## 2020-07-22 PROCEDURE — 99213 OFFICE O/P EST LOW 20 MIN: CPT | Performed by: INTERNAL MEDICINE

## 2020-07-22 NOTE — PROGRESS NOTES
Routine MD visit. Imaging up to date. Denies any issues with letrozole.    Education Record    Learner:  Patient/ spouse    Disease / [facility] of care    Barriers / Limitations:  None   Comments:    Method:  Discussion   Comments:    General Topic

## 2020-07-29 ENCOUNTER — HOSPITAL ENCOUNTER (OUTPATIENT)
Dept: LAB | Facility: HOSPITAL | Age: 75
Discharge: HOME | End: 2020-07-29
Attending: INTERNAL MEDICINE
Payer: MEDICARE

## 2020-07-29 ENCOUNTER — ANTI-COAG (OUTPATIENT)
Dept: CARDIOLOGY | Age: 75
End: 2020-07-29

## 2020-07-29 DIAGNOSIS — I48.92 ATRIAL FLUTTER, UNSPECIFIED TYPE (CMD): ICD-10-CM

## 2020-07-29 DIAGNOSIS — I48.0 PAROXYSMAL ATRIAL FIBRILLATION (CMD): ICD-10-CM

## 2020-07-29 DIAGNOSIS — Z79.01 LONG TERM (CURRENT) USE OF ANTICOAGULANTS: ICD-10-CM

## 2020-07-29 LAB
INR PPP: 2.1
POC INR: 2.1 (ref 0.8–1.3)

## 2020-07-29 PROCEDURE — 85610 PROTHROMBIN TIME: CPT

## 2020-08-26 ENCOUNTER — HOSPITAL ENCOUNTER (OUTPATIENT)
Dept: LAB | Facility: HOSPITAL | Age: 75
Discharge: HOME | End: 2020-08-26
Attending: INTERNAL MEDICINE
Payer: MEDICARE

## 2020-08-26 DIAGNOSIS — Z79.01 LONG TERM (CURRENT) USE OF ANTICOAGULANTS: ICD-10-CM

## 2020-08-26 LAB
INR PPP: 2.3
POC INR: 2.3 (ref 0.8–1.3)

## 2020-08-26 PROCEDURE — 85610 PROTHROMBIN TIME: CPT

## 2020-08-28 ENCOUNTER — ANTI-COAG (OUTPATIENT)
Dept: CARDIOLOGY | Age: 75
End: 2020-08-28

## 2020-08-28 DIAGNOSIS — I48.0 PAROXYSMAL ATRIAL FIBRILLATION (CMD): ICD-10-CM

## 2020-08-28 DIAGNOSIS — I48.92 ATRIAL FLUTTER, UNSPECIFIED TYPE (CMD): ICD-10-CM

## 2020-09-08 ENCOUNTER — APPOINTMENT (OUTPATIENT)
Dept: CARDIOLOGY | Age: 75
End: 2020-09-08

## 2020-09-22 ENCOUNTER — OFFICE VISIT (OUTPATIENT)
Dept: CARDIOLOGY | Age: 75
End: 2020-09-22

## 2020-09-22 VITALS
DIASTOLIC BLOOD PRESSURE: 64 MMHG | WEIGHT: 130 LBS | BODY MASS INDEX: 22.2 KG/M2 | SYSTOLIC BLOOD PRESSURE: 104 MMHG | HEART RATE: 51 BPM | HEIGHT: 64 IN

## 2020-09-22 DIAGNOSIS — I71.20 THORACIC AORTIC ANEURYSM WITHOUT RUPTURE (CMD): ICD-10-CM

## 2020-09-22 DIAGNOSIS — I48.0 PAROXYSMAL ATRIAL FIBRILLATION (CMD): Primary | ICD-10-CM

## 2020-09-22 DIAGNOSIS — I25.10 CORONARY ARTERY DISEASE INVOLVING NATIVE CORONARY ARTERY OF NATIVE HEART WITHOUT ANGINA PECTORIS: ICD-10-CM

## 2020-09-22 DIAGNOSIS — I10 ESSENTIAL HYPERTENSION: ICD-10-CM

## 2020-09-22 DIAGNOSIS — Z95.1 HX OF CABG: ICD-10-CM

## 2020-09-22 DIAGNOSIS — E78.00 PURE HYPERCHOLESTEROLEMIA: ICD-10-CM

## 2020-09-22 PROCEDURE — 99214 OFFICE O/P EST MOD 30 MIN: CPT | Performed by: INTERNAL MEDICINE

## 2020-09-22 PROCEDURE — 93000 ELECTROCARDIOGRAM COMPLETE: CPT | Performed by: INTERNAL MEDICINE

## 2020-09-22 SDOH — SOCIAL STABILITY: SOCIAL NETWORK: ARE YOU MARRIED, WIDOWED, DIVORCED, SEPARATED, NEVER MARRIED, OR LIVING WITH A PARTNER?: MARRIED

## 2020-09-22 SDOH — HEALTH STABILITY: PHYSICAL HEALTH: ON AVERAGE, HOW MANY DAYS PER WEEK DO YOU ENGAGE IN MODERATE TO STRENUOUS EXERCISE (LIKE A BRISK WALK)?: 3 DAYS

## 2020-09-22 SDOH — HEALTH STABILITY: MENTAL HEALTH: HOW OFTEN DO YOU HAVE A DRINK CONTAINING ALCOHOL?: NEVER

## 2020-09-22 SDOH — HEALTH STABILITY: PHYSICAL HEALTH: ON AVERAGE, HOW MANY MINUTES DO YOU ENGAGE IN EXERCISE AT THIS LEVEL?: 60 MIN

## 2020-09-22 ASSESSMENT — PATIENT HEALTH QUESTIONNAIRE - PHQ9
CLINICAL INTERPRETATION OF PHQ2 SCORE: NO FURTHER SCREENING NEEDED
1. LITTLE INTEREST OR PLEASURE IN DOING THINGS: NOT AT ALL
CLINICAL INTERPRETATION OF PHQ9 SCORE: NO FURTHER SCREENING NEEDED
SUM OF ALL RESPONSES TO PHQ9 QUESTIONS 1 AND 2: 0
SUM OF ALL RESPONSES TO PHQ9 QUESTIONS 1 AND 2: 0
2. FEELING DOWN, DEPRESSED OR HOPELESS: NOT AT ALL

## 2020-09-23 ENCOUNTER — ANTI-COAG (OUTPATIENT)
Dept: CARDIOLOGY | Age: 75
End: 2020-09-23

## 2020-09-23 ENCOUNTER — HOSPITAL ENCOUNTER (OUTPATIENT)
Dept: LAB | Facility: HOSPITAL | Age: 75
Discharge: HOME | End: 2020-09-23
Attending: INTERNAL MEDICINE
Payer: MEDICARE

## 2020-09-23 DIAGNOSIS — I48.0 PAROXYSMAL ATRIAL FIBRILLATION (CMD): ICD-10-CM

## 2020-09-23 DIAGNOSIS — Z79.01 LONG TERM (CURRENT) USE OF ANTICOAGULANTS: ICD-10-CM

## 2020-09-23 LAB
INR PPP: 2
POC INR: 2 (ref 0.8–1.3)

## 2020-09-23 PROCEDURE — 85610 PROTHROMBIN TIME: CPT

## 2020-09-30 ENCOUNTER — HOSPITAL ENCOUNTER (OUTPATIENT)
Dept: LAB | Facility: HOSPITAL | Age: 75
Discharge: HOME | End: 2020-09-30
Attending: INTERNAL MEDICINE
Payer: MEDICARE

## 2020-09-30 DIAGNOSIS — Z79.899 MEDICATION MANAGEMENT: ICD-10-CM

## 2020-09-30 PROCEDURE — 84439 ASSAY OF FREE THYROXINE: CPT

## 2020-09-30 PROCEDURE — 84443 ASSAY THYROID STIM HORMONE: CPT

## 2020-09-30 PROCEDURE — 80061 LIPID PANEL: CPT | Performed by: INTERNAL MEDICINE

## 2020-09-30 PROCEDURE — 36415 COLL VENOUS BLD VENIPUNCTURE: CPT | Performed by: INTERNAL MEDICINE

## 2020-10-02 ENCOUNTER — TELEPHONE (OUTPATIENT)
Dept: CARDIOLOGY | Age: 75
End: 2020-10-02

## 2020-10-02 NOTE — PROGRESS NOTES
, on HIPAA, calling to request lab results. Pt informed of MD's notes. Requesting copy of results. Address verified, and letter pended to be mailed.  verbalized understanding.

## 2020-10-21 ENCOUNTER — HOSPITAL ENCOUNTER (OUTPATIENT)
Dept: LAB | Facility: HOSPITAL | Age: 75
Discharge: HOME | End: 2020-10-21
Attending: INTERNAL MEDICINE
Payer: MEDICARE

## 2020-10-21 ENCOUNTER — ANTI-COAG (OUTPATIENT)
Dept: CARDIOLOGY | Age: 75
End: 2020-10-21

## 2020-10-21 DIAGNOSIS — Z79.01 LONG TERM (CURRENT) USE OF ANTICOAGULANTS: ICD-10-CM

## 2020-10-21 DIAGNOSIS — I48.0 PAROXYSMAL ATRIAL FIBRILLATION (CMD): ICD-10-CM

## 2020-10-21 LAB — INR PPP: 1.8

## 2020-10-21 PROCEDURE — 85610 PROTHROMBIN TIME: CPT

## 2020-10-26 RX ORDER — WARFARIN SODIUM 5 MG/1
TABLET ORAL
Qty: 90 TABLET | Refills: 1 | Status: SHIPPED | OUTPATIENT
Start: 2020-10-26

## 2020-10-26 RX ORDER — AMIODARONE HYDROCHLORIDE 200 MG/1
100 TABLET ORAL DAILY
Qty: 45 TABLET | Refills: 1 | Status: SHIPPED | OUTPATIENT
Start: 2020-10-26 | End: 2021-04-16

## 2020-11-04 ENCOUNTER — HOSPITAL ENCOUNTER (OUTPATIENT)
Dept: LAB | Facility: HOSPITAL | Age: 75
Discharge: HOME | End: 2020-11-04
Attending: INTERNAL MEDICINE
Payer: MEDICARE

## 2020-11-04 ENCOUNTER — HOSPITAL ENCOUNTER (OUTPATIENT)
Dept: BONE DENSITY | Age: 75
Discharge: HOME | End: 2020-11-04
Attending: INTERNAL MEDICINE
Payer: MEDICARE

## 2020-11-04 ENCOUNTER — ANTI-COAG (OUTPATIENT)
Dept: CARDIOLOGY | Age: 75
End: 2020-11-04

## 2020-11-04 ENCOUNTER — HOSPITAL ENCOUNTER (OUTPATIENT)
Dept: MAMMOGRAPHY | Facility: HOSPITAL | Age: 75
Discharge: HOME | End: 2020-11-04
Attending: INTERNAL MEDICINE
Payer: MEDICARE

## 2020-11-04 DIAGNOSIS — Z17.0 MALIGNANT NEOPLASM OF LEFT BREAST IN FEMALE, ESTROGEN RECEPTOR POSITIVE, UNSPECIFIED SITE OF BREAST (HCC): ICD-10-CM

## 2020-11-04 DIAGNOSIS — Z79.01 LONG TERM (CURRENT) USE OF ANTICOAGULANTS: ICD-10-CM

## 2020-11-04 DIAGNOSIS — C50.512 MALIGNANT NEOPLASM OF LOWER-OUTER QUADRANT OF LEFT BREAST OF FEMALE, ESTROGEN RECEPTOR POSITIVE (HCC): ICD-10-CM

## 2020-11-04 DIAGNOSIS — Z17.0 MALIGNANT NEOPLASM OF LOWER-OUTER QUADRANT OF LEFT BREAST OF FEMALE, ESTROGEN RECEPTOR POSITIVE (HCC): ICD-10-CM

## 2020-11-04 DIAGNOSIS — I48.0 PAROXYSMAL ATRIAL FIBRILLATION (CMD): ICD-10-CM

## 2020-11-04 DIAGNOSIS — C50.912 MALIGNANT NEOPLASM OF LEFT BREAST IN FEMALE, ESTROGEN RECEPTOR POSITIVE, UNSPECIFIED SITE OF BREAST (HCC): ICD-10-CM

## 2020-11-04 DIAGNOSIS — Z78.0 POSTMENOPAUSAL: ICD-10-CM

## 2020-11-04 LAB — INR PPP: 2.3

## 2020-11-04 PROCEDURE — 77066 DX MAMMO INCL CAD BI: CPT | Performed by: INTERNAL MEDICINE

## 2020-11-04 PROCEDURE — 77062 BREAST TOMOSYNTHESIS BI: CPT | Performed by: INTERNAL MEDICINE

## 2020-11-04 PROCEDURE — 77080 DXA BONE DENSITY AXIAL: CPT | Performed by: INTERNAL MEDICINE

## 2020-11-04 PROCEDURE — 85610 PROTHROMBIN TIME: CPT

## 2020-11-09 ENCOUNTER — TELEPHONE (OUTPATIENT)
Dept: HEMATOLOGY/ONCOLOGY | Facility: HOSPITAL | Age: 75
End: 2020-11-09

## 2020-11-09 NOTE — TELEPHONE ENCOUNTER
WILIAMM reviewing the message from Dr Abhishek Tucker regarding her dexa scan. Asked her to call back with any additional questions.

## 2020-11-12 DIAGNOSIS — C50.919 BREAST CANCER (HCC): ICD-10-CM

## 2020-11-12 RX ORDER — RAMIPRIL 5 MG/1
CAPSULE ORAL
Qty: 90 CAPSULE | Refills: 1 | Status: SHIPPED | OUTPATIENT
Start: 2020-11-12 | End: 2021-04-23

## 2020-11-12 RX ORDER — ANASTROZOLE 1 MG/1
TABLET ORAL
Qty: 90 TABLET | Refills: 0 | OUTPATIENT
Start: 2020-11-12

## 2020-11-12 RX ORDER — ANASTROZOLE 1 MG/1
1 TABLET ORAL DAILY
Qty: 90 TABLET | Refills: 1 | Status: SHIPPED | OUTPATIENT
Start: 2020-11-12 | End: 2021-01-27

## 2020-12-03 ENCOUNTER — HOSPITAL ENCOUNTER (OUTPATIENT)
Dept: LAB | Facility: HOSPITAL | Age: 75
Discharge: HOME | End: 2020-12-03
Attending: INTERNAL MEDICINE
Payer: MEDICARE

## 2020-12-03 ENCOUNTER — ANTI-COAG (OUTPATIENT)
Dept: CARDIOLOGY | Age: 75
End: 2020-12-03

## 2020-12-03 DIAGNOSIS — I48.0 PAROXYSMAL ATRIAL FIBRILLATION (CMD): ICD-10-CM

## 2020-12-03 DIAGNOSIS — Z79.01 LONG TERM (CURRENT) USE OF ANTICOAGULANTS: ICD-10-CM

## 2020-12-03 LAB — INR PPP: 2.3

## 2020-12-03 PROCEDURE — 85610 PROTHROMBIN TIME: CPT

## 2020-12-30 ENCOUNTER — HOSPITAL ENCOUNTER (OUTPATIENT)
Dept: LAB | Facility: HOSPITAL | Age: 75
Discharge: HOME | End: 2020-12-30
Attending: INTERNAL MEDICINE
Payer: MEDICARE

## 2020-12-30 DIAGNOSIS — Z79.01 LONG TERM (CURRENT) USE OF ANTICOAGULANTS: ICD-10-CM

## 2020-12-30 LAB
INR PPP: 2.6
POC INR: 2.6 (ref 0.8–1.3)

## 2020-12-30 PROCEDURE — 85610 PROTHROMBIN TIME: CPT

## 2020-12-31 ENCOUNTER — ANTI-COAG (OUTPATIENT)
Dept: CARDIOLOGY | Age: 75
End: 2020-12-31

## 2020-12-31 DIAGNOSIS — I48.0 PAROXYSMAL ATRIAL FIBRILLATION (CMD): ICD-10-CM

## 2021-01-26 NOTE — PROGRESS NOTES
[facility] Progress Note      Patient Name:  Madison Jackson  YOB: 1945  Medical Record Number:  [id number]    Date of visit: 1/27/2021      CHIEF COMPLAINT: Stage IIA L breast ca, s/p lumpectomy, RT.    HPI:  77year old female that Vitamin B-12 (VITAMIN B12) 500 MCG Oral Tab Take 500 mcg by mouth daily. • Metoprolol Tartrate (LOPRESSOR) 25 MG Oral Tab Take 1 tablet by mouth 2 (two) times daily.  60 tablet 11       VITALS:  Height: 162.6 cm (5' 4.02\") (01/27 0181)  Weight: 59.7 kg [facility] [address], [facility], [address]    1/27/2021

## 2021-01-27 ENCOUNTER — OFFICE VISIT (OUTPATIENT)
Dept: HEMATOLOGY/ONCOLOGY | Facility: HOSPITAL | Age: 76
End: 2021-01-27
Attending: INTERNAL MEDICINE
Payer: MEDICARE

## 2021-01-27 ENCOUNTER — HOSPITAL ENCOUNTER (OUTPATIENT)
Dept: LAB | Facility: HOSPITAL | Age: 76
Discharge: HOME | End: 2021-01-27
Attending: INTERNAL MEDICINE
Payer: MEDICARE

## 2021-01-27 ENCOUNTER — ANTI-COAG (OUTPATIENT)
Dept: CARDIOLOGY | Age: 76
End: 2021-01-27

## 2021-01-27 VITALS
RESPIRATION RATE: 16 BRPM | HEIGHT: 64.02 IN | OXYGEN SATURATION: 96 % | TEMPERATURE: 97 F | BODY MASS INDEX: 22.47 KG/M2 | WEIGHT: 131.63 LBS | HEART RATE: 58 BPM | DIASTOLIC BLOOD PRESSURE: 73 MMHG | SYSTOLIC BLOOD PRESSURE: 127 MMHG

## 2021-01-27 DIAGNOSIS — M85.80 OSTEOPENIA, UNSPECIFIED LOCATION: ICD-10-CM

## 2021-01-27 DIAGNOSIS — Z79.01 LONG TERM (CURRENT) USE OF ANTICOAGULANTS: ICD-10-CM

## 2021-01-27 DIAGNOSIS — C50.912 MALIGNANT NEOPLASM OF LEFT BREAST IN FEMALE, ESTROGEN RECEPTOR POSITIVE, UNSPECIFIED SITE OF BREAST (HCC): Primary | ICD-10-CM

## 2021-01-27 DIAGNOSIS — Z78.0 POSTMENOPAUSAL: ICD-10-CM

## 2021-01-27 DIAGNOSIS — I48.0 PAROXYSMAL ATRIAL FIBRILLATION (CMD): ICD-10-CM

## 2021-01-27 DIAGNOSIS — Z17.0 MALIGNANT NEOPLASM OF LEFT BREAST IN FEMALE, ESTROGEN RECEPTOR POSITIVE, UNSPECIFIED SITE OF BREAST (HCC): Primary | ICD-10-CM

## 2021-01-27 LAB
INR PPP: 2.8
POC INR: 2.8 (ref 0.8–1.3)

## 2021-01-27 PROCEDURE — 99213 OFFICE O/P EST LOW 20 MIN: CPT | Performed by: INTERNAL MEDICINE

## 2021-01-27 PROCEDURE — 85610 PROTHROMBIN TIME: CPT

## 2021-02-06 DIAGNOSIS — Z23 NEED FOR VACCINATION: ICD-10-CM

## 2021-02-08 RX ORDER — SIMVASTATIN 40 MG
TABLET ORAL
Qty: 90 TABLET | Refills: 0 | Status: SHIPPED | OUTPATIENT
Start: 2021-02-08 | End: 2021-02-11

## 2021-02-09 RX ORDER — SIMVASTATIN 40 MG
TABLET ORAL
Qty: 90 TABLET | Refills: 0 | OUTPATIENT
Start: 2021-02-09

## 2021-02-11 RX ORDER — SIMVASTATIN 40 MG
TABLET ORAL
Qty: 90 TABLET | Refills: 0 | Status: SHIPPED | OUTPATIENT
Start: 2021-02-11 | End: 2021-04-23

## 2021-02-24 ENCOUNTER — ANTI-COAG (OUTPATIENT)
Dept: CARDIOLOGY | Age: 76
End: 2021-02-24

## 2021-02-24 ENCOUNTER — HOSPITAL ENCOUNTER (OUTPATIENT)
Dept: LAB | Facility: HOSPITAL | Age: 76
Discharge: HOME | End: 2021-02-24
Attending: INTERNAL MEDICINE
Payer: MEDICARE

## 2021-02-24 DIAGNOSIS — I48.0 PAROXYSMAL ATRIAL FIBRILLATION (CMD): ICD-10-CM

## 2021-02-24 DIAGNOSIS — Z79.01 LONG TERM (CURRENT) USE OF ANTICOAGULANTS: ICD-10-CM

## 2021-02-24 LAB
INR PPP: 2.5
POC INR: 2.5 (ref 0.8–1.3)

## 2021-02-24 PROCEDURE — 85610 PROTHROMBIN TIME: CPT

## 2021-03-07 ENCOUNTER — IMMUNIZATION (OUTPATIENT)
Dept: LAB | Age: 76
End: 2021-03-07
Attending: HOSPITALIST
Payer: MEDICARE

## 2021-03-07 DIAGNOSIS — Z23 NEED FOR VACCINATION: Primary | ICD-10-CM

## 2021-03-07 PROCEDURE — 0001A SARSCOV2 VAC 30MCG/0.3ML IM: CPT

## 2021-03-24 ENCOUNTER — HOSPITAL ENCOUNTER (OUTPATIENT)
Dept: LAB | Facility: HOSPITAL | Age: 76
Discharge: HOME | End: 2021-03-24
Attending: INTERNAL MEDICINE
Payer: MEDICARE

## 2021-03-24 ENCOUNTER — ANTI-COAG (OUTPATIENT)
Dept: CARDIOLOGY | Age: 76
End: 2021-03-24

## 2021-03-24 DIAGNOSIS — Z79.01 LONG TERM (CURRENT) USE OF ANTICOAGULANTS: ICD-10-CM

## 2021-03-24 DIAGNOSIS — I48.0 PAROXYSMAL ATRIAL FIBRILLATION (CMD): ICD-10-CM

## 2021-03-24 LAB
INR PPP: 2.3
POC INR: 2.3 (ref 0.8–1.3)

## 2021-03-24 PROCEDURE — 85610 PROTHROMBIN TIME: CPT

## 2021-03-28 ENCOUNTER — IMMUNIZATION (OUTPATIENT)
Dept: LAB | Age: 76
End: 2021-03-28
Attending: HOSPITALIST
Payer: MEDICARE

## 2021-03-28 DIAGNOSIS — Z23 NEED FOR VACCINATION: Primary | ICD-10-CM

## 2021-03-28 PROCEDURE — 0002A SARSCOV2 VAC 30MCG/0.3ML IM: CPT

## 2021-03-30 ENCOUNTER — OFFICE VISIT (OUTPATIENT)
Dept: CARDIOLOGY | Age: 76
End: 2021-03-30

## 2021-03-30 VITALS
BODY MASS INDEX: 22.71 KG/M2 | DIASTOLIC BLOOD PRESSURE: 72 MMHG | HEART RATE: 54 BPM | SYSTOLIC BLOOD PRESSURE: 130 MMHG | HEIGHT: 64 IN | WEIGHT: 133 LBS

## 2021-03-30 DIAGNOSIS — I48.0 PAROXYSMAL ATRIAL FIBRILLATION (CMD): Primary | ICD-10-CM

## 2021-03-30 DIAGNOSIS — I71.20 THORACIC AORTIC ANEURYSM WITHOUT RUPTURE (CMD): ICD-10-CM

## 2021-03-30 DIAGNOSIS — Z95.1 HX OF CABG: ICD-10-CM

## 2021-03-30 DIAGNOSIS — I25.10 CORONARY ARTERY DISEASE INVOLVING NATIVE CORONARY ARTERY OF NATIVE HEART WITHOUT ANGINA PECTORIS: ICD-10-CM

## 2021-03-30 DIAGNOSIS — I10 ESSENTIAL HYPERTENSION: ICD-10-CM

## 2021-03-30 DIAGNOSIS — E78.00 PURE HYPERCHOLESTEROLEMIA: ICD-10-CM

## 2021-03-30 PROCEDURE — 93000 ELECTROCARDIOGRAM COMPLETE: CPT | Performed by: INTERNAL MEDICINE

## 2021-03-30 PROCEDURE — 99214 OFFICE O/P EST MOD 30 MIN: CPT | Performed by: INTERNAL MEDICINE

## 2021-03-30 ASSESSMENT — PATIENT HEALTH QUESTIONNAIRE - PHQ9
CLINICAL INTERPRETATION OF PHQ2 SCORE: NO FURTHER SCREENING NEEDED
SUM OF ALL RESPONSES TO PHQ9 QUESTIONS 1 AND 2: 0
CLINICAL INTERPRETATION OF PHQ9 SCORE: NO FURTHER SCREENING NEEDED
1. LITTLE INTEREST OR PLEASURE IN DOING THINGS: NOT AT ALL
2. FEELING DOWN, DEPRESSED OR HOPELESS: NOT AT ALL
SUM OF ALL RESPONSES TO PHQ9 QUESTIONS 1 AND 2: 0

## 2021-04-05 ENCOUNTER — OFFICE VISIT (OUTPATIENT)
Dept: FAMILY MEDICINE CLINIC | Facility: CLINIC | Age: 76
End: 2021-04-05
Payer: MEDICARE

## 2021-04-05 ENCOUNTER — LAB ENCOUNTER (OUTPATIENT)
Dept: LAB | Age: 76
End: 2021-04-05
Attending: FAMILY MEDICINE
Payer: MEDICARE

## 2021-04-05 VITALS
HEIGHT: 64 IN | HEART RATE: 63 BPM | DIASTOLIC BLOOD PRESSURE: 80 MMHG | BODY MASS INDEX: 22.88 KG/M2 | WEIGHT: 134 LBS | SYSTOLIC BLOOD PRESSURE: 134 MMHG | RESPIRATION RATE: 16 BRPM

## 2021-04-05 DIAGNOSIS — Z85.3 HISTORY OF BREAST CANCER: ICD-10-CM

## 2021-04-05 DIAGNOSIS — T46.2X5A HYPERTHYROIDISM SECONDARY TO AMIODARONE: Primary | ICD-10-CM

## 2021-04-05 DIAGNOSIS — T46.2X5A HYPERTHYROIDISM SECONDARY TO AMIODARONE: ICD-10-CM

## 2021-04-05 DIAGNOSIS — E05.80 HYPERTHYROIDISM SECONDARY TO AMIODARONE: ICD-10-CM

## 2021-04-05 DIAGNOSIS — E05.80 HYPERTHYROIDISM SECONDARY TO AMIODARONE: Primary | ICD-10-CM

## 2021-04-05 PROBLEM — I45.10 RBBB: Status: ACTIVE | Noted: 2021-04-05

## 2021-04-05 PROCEDURE — 84439 ASSAY OF FREE THYROXINE: CPT

## 2021-04-05 PROCEDURE — 36415 COLL VENOUS BLD VENIPUNCTURE: CPT

## 2021-04-05 PROCEDURE — 99203 OFFICE O/P NEW LOW 30 MIN: CPT | Performed by: FAMILY MEDICINE

## 2021-04-05 PROCEDURE — 84443 ASSAY THYROID STIM HORMONE: CPT

## 2021-04-05 NOTE — PROGRESS NOTES
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 MUSCULOSKELETAL:  Denies muscle, back pain, joint pain or stiffness.     EXAM:   /80 (BP Location: Right arm, Patient Position: Sitting, Cuff Size: adult)   Pulse 63   Resp 16   Ht 5' 4\" (1.626 m) of today.      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

## 2021-04-07 DIAGNOSIS — E05.80 HYPERTHYROIDISM SECONDARY TO AMIODARONE: Primary | ICD-10-CM

## 2021-04-07 DIAGNOSIS — T46.2X5A HYPERTHYROIDISM SECONDARY TO AMIODARONE: Primary | ICD-10-CM

## 2021-04-16 RX ORDER — AMIODARONE HYDROCHLORIDE 200 MG/1
TABLET ORAL
Qty: 45 TABLET | Refills: 1 | Status: SHIPPED | OUTPATIENT
Start: 2021-04-16

## 2021-04-19 ENCOUNTER — TELEPHONE (OUTPATIENT)
Dept: CARDIOLOGY | Age: 76
End: 2021-04-19

## 2021-04-19 DIAGNOSIS — I48.0 PAROXYSMAL ATRIAL FIBRILLATION (CMD): Primary | ICD-10-CM

## 2021-04-19 DIAGNOSIS — Z79.01 LONG TERM (CURRENT) USE OF ANTICOAGULANTS: ICD-10-CM

## 2021-04-20 DIAGNOSIS — I48.0 PAROXYSMAL ATRIAL FIBRILLATION (CMD): Primary | ICD-10-CM

## 2021-04-20 DIAGNOSIS — I48.92 ATRIAL FLUTTER, UNSPECIFIED TYPE (CMD): ICD-10-CM

## 2021-04-21 ENCOUNTER — HOSPITAL ENCOUNTER (OUTPATIENT)
Dept: LAB | Facility: HOSPITAL | Age: 76
Discharge: HOME | End: 2021-04-21
Attending: INTERNAL MEDICINE
Payer: MEDICARE

## 2021-04-21 ENCOUNTER — ANTI-COAG (OUTPATIENT)
Dept: CARDIOLOGY | Age: 76
End: 2021-04-21

## 2021-04-21 DIAGNOSIS — I48.0 PAROXYSMAL ATRIAL FIBRILLATION (HCC): ICD-10-CM

## 2021-04-21 DIAGNOSIS — I48.0 PAROXYSMAL ATRIAL FIBRILLATION (CMD): ICD-10-CM

## 2021-04-21 DIAGNOSIS — I48.92 ATRIAL FLUTTER (HCC): ICD-10-CM

## 2021-04-21 DIAGNOSIS — I48.92 ATRIAL FLUTTER, UNSPECIFIED TYPE (CMD): ICD-10-CM

## 2021-04-21 LAB — INR PPP: 2.4

## 2021-04-21 PROCEDURE — 85610 PROTHROMBIN TIME: CPT

## 2021-04-23 DIAGNOSIS — C50.919 BREAST CANCER (HCC): ICD-10-CM

## 2021-04-23 RX ORDER — SIMVASTATIN 40 MG
TABLET ORAL
Qty: 90 TABLET | Refills: 3 | Status: SHIPPED | OUTPATIENT
Start: 2021-04-23

## 2021-04-23 RX ORDER — ANASTROZOLE 1 MG/1
1 TABLET ORAL DAILY
Qty: 90 TABLET | Refills: 3 | Status: SHIPPED | OUTPATIENT
Start: 2021-04-23 | End: 2022-02-02

## 2021-04-23 RX ORDER — RAMIPRIL 5 MG/1
CAPSULE ORAL
Qty: 90 CAPSULE | Refills: 3 | Status: SHIPPED | OUTPATIENT
Start: 2021-04-23

## 2021-05-19 ENCOUNTER — HOSPITAL ENCOUNTER (OUTPATIENT)
Dept: LAB | Facility: HOSPITAL | Age: 76
Discharge: HOME | End: 2021-05-19
Attending: INTERNAL MEDICINE
Payer: MEDICARE

## 2021-05-19 DIAGNOSIS — I48.0 PAROXYSMAL ATRIAL FIBRILLATION (HCC): ICD-10-CM

## 2021-05-19 DIAGNOSIS — I48.92 ATRIAL FLUTTER (HCC): ICD-10-CM

## 2021-05-19 LAB — INR PPP: 2.4

## 2021-05-19 PROCEDURE — 85610 PROTHROMBIN TIME: CPT

## 2021-05-20 ENCOUNTER — ANTI-COAG (OUTPATIENT)
Dept: CARDIOLOGY | Age: 76
End: 2021-05-20

## 2021-05-20 DIAGNOSIS — I48.92 ATRIAL FLUTTER, UNSPECIFIED TYPE (CMD): ICD-10-CM

## 2021-05-20 DIAGNOSIS — I48.0 PAROXYSMAL ATRIAL FIBRILLATION (CMD): ICD-10-CM

## 2021-06-16 ENCOUNTER — TELEPHONE (OUTPATIENT)
Dept: FAMILY MEDICINE CLINIC | Facility: CLINIC | Age: 76
End: 2021-06-16

## 2021-06-16 ENCOUNTER — HOSPITAL ENCOUNTER (OUTPATIENT)
Dept: LAB | Facility: HOSPITAL | Age: 76
Discharge: HOME | End: 2021-06-16
Attending: INTERNAL MEDICINE
Payer: MEDICARE

## 2021-06-16 ENCOUNTER — TELEPHONE (OUTPATIENT)
Dept: CARDIOLOGY | Age: 76
End: 2021-06-16

## 2021-06-16 ENCOUNTER — ANTI-COAG (OUTPATIENT)
Dept: CARDIOLOGY | Age: 76
End: 2021-06-16

## 2021-06-16 DIAGNOSIS — I48.0 PAROXYSMAL ATRIAL FIBRILLATION (HCC): ICD-10-CM

## 2021-06-16 DIAGNOSIS — I48.0 PAROXYSMAL ATRIAL FIBRILLATION (CMD): Primary | ICD-10-CM

## 2021-06-16 DIAGNOSIS — I48.92 ATRIAL FLUTTER (HCC): ICD-10-CM

## 2021-06-16 LAB — INR PPP: 2.7

## 2021-06-16 PROCEDURE — 85610 PROTHROMBIN TIME: CPT

## 2021-07-14 ENCOUNTER — HOSPITAL ENCOUNTER (OUTPATIENT)
Dept: LAB | Facility: HOSPITAL | Age: 76
Discharge: HOME | End: 2021-07-14
Attending: INTERNAL MEDICINE
Payer: MEDICARE

## 2021-07-14 DIAGNOSIS — I48.0 PAROXYSMAL ATRIAL FIBRILLATION (HCC): ICD-10-CM

## 2021-07-14 DIAGNOSIS — I48.92 ATRIAL FLUTTER (HCC): ICD-10-CM

## 2021-07-14 LAB — POC INR: 2.4 (ref 0.9–1.1)

## 2021-07-14 PROCEDURE — 85610 PROTHROMBIN TIME: CPT

## 2021-07-21 ENCOUNTER — TELEPHONE (OUTPATIENT)
Dept: CARDIOLOGY | Age: 76
End: 2021-07-21

## 2021-07-21 DIAGNOSIS — I48.0 PAROXYSMAL ATRIAL FIBRILLATION (CMD): Primary | ICD-10-CM

## 2021-07-27 NOTE — PROGRESS NOTES
[facility] Progress Note      Patient Name:  Brittany Barraza  YOB: 1945  Medical Record Number:  [id number]    Date of visit: 7/28/2021      CHIEF COMPLAINT: Stage IIA L breast ca, s/p lumpectomy, RT.    HPI:  77year old female that 5 MG Oral Cap Take 1 capsule (5 mg total) by mouth daily. 30 capsule 11   • Amiodarone HCl (PACERONE) 100 MG Oral Tab Take 1 tablet (100 mg total) by mouth daily. 3 tablet 0   • simvastatin (ZOCOR) 40 MG Oral Tab Take 40 mg by mouth nightly.      • Vitamin [facility] 2D+3D SCREENING BILAT (CPT=77067/90051)      Return in about 6 months (around 1/28/2022) for MD visit. Jaden Davis M.D.     [facility]    [address], [address]    7/28/2021

## 2021-07-28 ENCOUNTER — OFFICE VISIT (OUTPATIENT)
Dept: HEMATOLOGY/ONCOLOGY | Facility: HOSPITAL | Age: 76
End: 2021-07-28
Attending: INTERNAL MEDICINE
Payer: MEDICARE

## 2021-07-28 VITALS
DIASTOLIC BLOOD PRESSURE: 74 MMHG | WEIGHT: 131.19 LBS | BODY MASS INDEX: 23 KG/M2 | RESPIRATION RATE: 16 BRPM | OXYGEN SATURATION: 95 % | HEART RATE: 53 BPM | SYSTOLIC BLOOD PRESSURE: 139 MMHG | TEMPERATURE: 98 F

## 2021-07-28 DIAGNOSIS — Z01.419 WELL WOMAN EXAM: Primary | ICD-10-CM

## 2021-07-28 DIAGNOSIS — Z12.31 ENCOUNTER FOR SCREENING MAMMOGRAM FOR MALIGNANT NEOPLASM OF BREAST: ICD-10-CM

## 2021-07-28 PROCEDURE — 99213 OFFICE O/P EST LOW 20 MIN: CPT | Performed by: INTERNAL MEDICINE

## 2021-07-28 NOTE — PROGRESS NOTES
Education Record    Learner:  Patient/ spouse    Disease / [address] breast cancer    Barriers / Limitations:  None   Comments:    Method:  Discussion   Comments:    General Topics:  Plan of care reviewed   Comments:    Outcome:  Shows understanding

## 2021-08-11 ENCOUNTER — HOSPITAL ENCOUNTER (OUTPATIENT)
Dept: LAB | Facility: HOSPITAL | Age: 76
Discharge: HOME | End: 2021-08-11
Attending: INTERNAL MEDICINE
Payer: MEDICARE

## 2021-08-11 DIAGNOSIS — I48.92 ATRIAL FLUTTER (HCC): ICD-10-CM

## 2021-08-11 DIAGNOSIS — I48.0 PAROXYSMAL ATRIAL FIBRILLATION (HCC): ICD-10-CM

## 2021-08-11 LAB — POC INR: 2.9 (ref 0.9–1.1)

## 2021-08-11 PROCEDURE — 85610 PROTHROMBIN TIME: CPT

## 2021-09-08 ENCOUNTER — HOSPITAL ENCOUNTER (OUTPATIENT)
Dept: LAB | Facility: HOSPITAL | Age: 76
Discharge: HOME | End: 2021-09-08
Attending: INTERNAL MEDICINE
Payer: MEDICARE

## 2021-09-08 DIAGNOSIS — I48.0 PAROXYSMAL ATRIAL FIBRILLATION (HCC): ICD-10-CM

## 2021-09-08 DIAGNOSIS — I48.92 ATRIAL FLUTTER (HCC): ICD-10-CM

## 2021-09-08 LAB — POC INR: 2.6 (ref 0.9–1.1)

## 2021-09-08 PROCEDURE — 85610 PROTHROMBIN TIME: CPT

## 2021-09-29 ENCOUNTER — HOSPITAL ENCOUNTER (OUTPATIENT)
Dept: LAB | Facility: HOSPITAL | Age: 76
Discharge: HOME | End: 2021-09-29
Attending: INTERNAL MEDICINE
Payer: MEDICARE

## 2021-09-29 PROCEDURE — 80061 LIPID PANEL: CPT | Performed by: INTERNAL MEDICINE

## 2021-09-29 PROCEDURE — 36415 COLL VENOUS BLD VENIPUNCTURE: CPT | Performed by: INTERNAL MEDICINE

## 2021-10-04 ENCOUNTER — IMMUNIZATION (OUTPATIENT)
Dept: LAB | Facility: HOSPITAL | Age: 76
End: 2021-10-04
Attending: EMERGENCY MEDICINE
Payer: MEDICARE

## 2021-10-04 DIAGNOSIS — Z23 NEED FOR VACCINATION: Primary | ICD-10-CM

## 2021-10-04 PROCEDURE — 0003A SARSCOV2 VAC 30MCG/0.3ML IM: CPT

## 2021-10-06 ENCOUNTER — HOSPITAL ENCOUNTER (OUTPATIENT)
Dept: LAB | Facility: HOSPITAL | Age: 76
Discharge: HOME | End: 2021-10-06
Attending: INTERNAL MEDICINE
Payer: MEDICARE

## 2021-10-06 ENCOUNTER — HOSPITAL ENCOUNTER (OUTPATIENT)
Dept: LAB | Facility: HOSPITAL | Age: 76
Discharge: HOME | End: 2021-10-06
Attending: FAMILY MEDICINE
Payer: MEDICARE

## 2021-10-06 DIAGNOSIS — I48.0 PAROXYSMAL ATRIAL FIBRILLATION (HCC): ICD-10-CM

## 2021-10-06 DIAGNOSIS — I48.92 ATRIAL FLUTTER (HCC): ICD-10-CM

## 2021-10-06 DIAGNOSIS — E05.80 HYPERTHYROIDISM SECONDARY TO AMIODARONE: ICD-10-CM

## 2021-10-06 DIAGNOSIS — T46.2X5A HYPERTHYROIDISM SECONDARY TO AMIODARONE: ICD-10-CM

## 2021-10-06 PROCEDURE — 36415 COLL VENOUS BLD VENIPUNCTURE: CPT | Performed by: FAMILY MEDICINE

## 2021-10-06 PROCEDURE — 84443 ASSAY THYROID STIM HORMONE: CPT | Performed by: FAMILY MEDICINE

## 2021-10-06 PROCEDURE — 85610 PROTHROMBIN TIME: CPT

## 2021-10-06 PROCEDURE — 84439 ASSAY OF FREE THYROXINE: CPT | Performed by: FAMILY MEDICINE

## 2021-10-14 ENCOUNTER — OFFICE VISIT (OUTPATIENT)
Dept: FAMILY MEDICINE CLINIC | Facility: CLINIC | Age: 76
End: 2021-10-14
Payer: MEDICARE

## 2021-10-14 VITALS
HEART RATE: 56 BPM | DIASTOLIC BLOOD PRESSURE: 70 MMHG | SYSTOLIC BLOOD PRESSURE: 124 MMHG | TEMPERATURE: 98 F | RESPIRATION RATE: 16 BRPM | HEIGHT: 64 IN | WEIGHT: 141 LBS | BODY MASS INDEX: 24.07 KG/M2

## 2021-10-14 DIAGNOSIS — T46.2X5A HYPERTHYROIDISM SECONDARY TO AMIODARONE: Primary | ICD-10-CM

## 2021-10-14 DIAGNOSIS — E05.80 HYPERTHYROIDISM SECONDARY TO AMIODARONE: Primary | ICD-10-CM

## 2021-10-14 PROCEDURE — 99213 OFFICE O/P EST LOW 20 MIN: CPT | Performed by: FAMILY MEDICINE

## 2021-10-14 NOTE — PROGRESS NOTES
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   Patient has a history of A. fi drinks      Types: 4 Glasses of wine per week      Comment: occasionally 4 glasses red wine weekly    Drug use: No    Family History:  Family History   Problem Relation Age of Onset   • Heart Disorder Father    • Diabetes Mother    • Breast Cancer Self 80 extremities. MUSCULOSKELETAL:  Denies muscle, back pain, joint pain or stiffness.     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

## 2021-10-19 ENCOUNTER — APPOINTMENT (OUTPATIENT)
Dept: CARDIOLOGY | Age: 76
End: 2021-10-19

## 2021-11-03 ENCOUNTER — HOSPITAL ENCOUNTER (OUTPATIENT)
Dept: LAB | Facility: HOSPITAL | Age: 76
Discharge: HOME | End: 2021-11-03
Attending: INTERNAL MEDICINE
Payer: MEDICARE

## 2021-11-03 DIAGNOSIS — I48.0 PAROXYSMAL ATRIAL FIBRILLATION (HCC): ICD-10-CM

## 2021-11-03 DIAGNOSIS — I48.92 ATRIAL FLUTTER (HCC): ICD-10-CM

## 2021-11-03 PROCEDURE — 85610 PROTHROMBIN TIME: CPT

## 2021-11-10 ENCOUNTER — HOSPITAL ENCOUNTER (OUTPATIENT)
Dept: MAMMOGRAPHY | Facility: HOSPITAL | Age: 76
Discharge: HOME | End: 2021-11-10
Attending: INTERNAL MEDICINE
Payer: MEDICARE

## 2021-11-10 DIAGNOSIS — Z12.31 ENCOUNTER FOR SCREENING MAMMOGRAM FOR MALIGNANT NEOPLASM OF BREAST: ICD-10-CM

## 2021-11-10 DIAGNOSIS — Z01.419 WELL WOMAN EXAM: ICD-10-CM

## 2021-11-10 PROCEDURE — 77063 BREAST TOMOSYNTHESIS BI: CPT | Performed by: INTERNAL MEDICINE

## 2021-11-10 PROCEDURE — 77067 SCR MAMMO BI INCL CAD: CPT | Performed by: INTERNAL MEDICINE

## 2021-11-15 ENCOUNTER — TELEPHONE (OUTPATIENT)
Dept: HEMATOLOGY/ONCOLOGY | Facility: HOSPITAL | Age: 76
End: 2021-11-15

## 2021-11-15 NOTE — TELEPHONE ENCOUNTER
Returned the call. Reviewed the attached message on the mammogram, stable with repeat in 1 year. Verbalized understanding.

## 2021-11-30 ENCOUNTER — HOSPITAL ENCOUNTER (OUTPATIENT)
Dept: LAB | Facility: HOSPITAL | Age: 76
Discharge: HOME | End: 2021-11-30
Attending: INTERNAL MEDICINE
Payer: MEDICARE

## 2021-11-30 DIAGNOSIS — I48.92 ATRIAL FLUTTER (HCC): ICD-10-CM

## 2021-11-30 DIAGNOSIS — I48.0 PAROXYSMAL ATRIAL FIBRILLATION (HCC): ICD-10-CM

## 2021-11-30 PROCEDURE — 85610 PROTHROMBIN TIME: CPT

## 2021-12-16 PROBLEM — Z95.2 S/P AVR (AORTIC VALVE REPLACEMENT) AND AORTOPLASTY: Status: ACTIVE | Noted: 2021-12-16

## 2021-12-16 PROBLEM — Z51.81 MEDICATION MONITORING ENCOUNTER: Status: ACTIVE | Noted: 2021-12-16

## 2021-12-16 PROBLEM — I70.0 AORTIC ATHEROSCLEROSIS: Status: ACTIVE | Noted: 2021-12-16

## 2021-12-16 PROBLEM — I70.0 AORTIC ATHEROSCLEROSIS (HCC): Status: ACTIVE | Noted: 2021-12-16

## 2021-12-27 ENCOUNTER — HOSPITAL ENCOUNTER (OUTPATIENT)
Dept: LAB | Facility: HOSPITAL | Age: 76
Discharge: HOME | End: 2021-12-27
Attending: INTERNAL MEDICINE
Payer: MEDICARE

## 2021-12-27 DIAGNOSIS — I48.0 PAROXYSMAL ATRIAL FIBRILLATION (HCC): ICD-10-CM

## 2021-12-27 DIAGNOSIS — I48.92 ATRIAL FLUTTER (HCC): ICD-10-CM

## 2021-12-27 LAB — INR BLDC: 2.4 (ref 0.9–1.1)

## 2021-12-27 PROCEDURE — 85610 PROTHROMBIN TIME: CPT

## 2022-01-20 ENCOUNTER — HOSPITAL ENCOUNTER (OUTPATIENT)
Dept: LAB | Facility: HOSPITAL | Age: 77
Discharge: HOME | End: 2022-01-20
Attending: INTERNAL MEDICINE
Payer: MEDICARE

## 2022-01-20 DIAGNOSIS — I48.92 ATRIAL FLUTTER (HCC): ICD-10-CM

## 2022-01-20 DIAGNOSIS — I48.0 PAROXYSMAL ATRIAL FIBRILLATION (HCC): ICD-10-CM

## 2022-01-20 LAB — INR BLDC: 2.2 (ref 0.9–1.1)

## 2022-01-20 PROCEDURE — 85610 PROTHROMBIN TIME: CPT

## 2022-02-02 ENCOUNTER — OFFICE VISIT (OUTPATIENT)
Dept: HEMATOLOGY/ONCOLOGY | Facility: HOSPITAL | Age: 77
End: 2022-02-02
Attending: INTERNAL MEDICINE
Payer: MEDICARE

## 2022-02-02 VITALS
WEIGHT: 132 LBS | SYSTOLIC BLOOD PRESSURE: 124 MMHG | HEIGHT: 62.6 IN | DIASTOLIC BLOOD PRESSURE: 61 MMHG | HEART RATE: 61 BPM | OXYGEN SATURATION: 97 % | BODY MASS INDEX: 23.68 KG/M2 | TEMPERATURE: 98 F | RESPIRATION RATE: 16 BRPM

## 2022-02-02 DIAGNOSIS — C50.919 BREAST CANCER (HCC): ICD-10-CM

## 2022-02-02 DIAGNOSIS — M85.80 OSTEOPENIA, UNSPECIFIED LOCATION: ICD-10-CM

## 2022-02-02 DIAGNOSIS — Z78.0 POST-MENOPAUSAL: ICD-10-CM

## 2022-02-02 DIAGNOSIS — Z12.31 SCREENING MAMMOGRAM FOR BREAST CANCER: Primary | ICD-10-CM

## 2022-02-02 PROCEDURE — 99213 OFFICE O/P EST LOW 20 MIN: CPT | Performed by: INTERNAL MEDICINE

## 2022-02-02 RX ORDER — AMIODARONE HYDROCHLORIDE 200 MG/1
100 TABLET ORAL DAILY
COMMUNITY
Start: 2022-01-04

## 2022-02-02 RX ORDER — ANASTROZOLE 1 MG/1
1 TABLET ORAL DAILY
Qty: 90 TABLET | Refills: 3 | Status: SHIPPED | OUTPATIENT
Start: 2022-02-02

## 2022-02-02 NOTE — PROGRESS NOTES
Education Record    Learner:  Patient/ spouse    Disease / [address] breast cancer      Barriers / Limitations:  None   Comments:    Method:  Discussion   Comments:    General Topics:  Plan of care reviewed   Comments:    Outcome:  Shows understanding   Comments:  Taking anastrazole. Breast imaging up to date.

## 2022-02-15 ENCOUNTER — HOSPITAL ENCOUNTER (OUTPATIENT)
Dept: LAB | Facility: HOSPITAL | Age: 77
Discharge: HOME | End: 2022-02-15
Attending: INTERNAL MEDICINE
Payer: MEDICARE

## 2022-02-15 DIAGNOSIS — I48.92 ATRIAL FLUTTER (HCC): ICD-10-CM

## 2022-02-15 DIAGNOSIS — I48.0 PAROXYSMAL ATRIAL FIBRILLATION (HCC): ICD-10-CM

## 2022-02-15 LAB — INR BLDC: 2.5 (ref 0.9–1.1)

## 2022-02-15 PROCEDURE — 85610 PROTHROMBIN TIME: CPT

## 2022-03-04 ENCOUNTER — OFFICE VISIT (OUTPATIENT)
Dept: FAMILY MEDICINE CLINIC | Facility: CLINIC | Age: 77
End: 2022-03-04
Payer: MEDICARE

## 2022-03-04 DIAGNOSIS — H81.10 BENIGN PAROXYSMAL VERTIGO, UNSPECIFIED LATERALITY: ICD-10-CM

## 2022-03-04 DIAGNOSIS — H65.01 NON-RECURRENT ACUTE SEROUS OTITIS MEDIA OF RIGHT EAR: Primary | ICD-10-CM

## 2022-03-04 PROCEDURE — 99213 OFFICE O/P EST LOW 20 MIN: CPT | Performed by: NURSE PRACTITIONER

## 2022-03-04 RX ORDER — PREDNISONE 20 MG/1
TABLET ORAL
Qty: 10 TABLET | Refills: 0 | Status: SHIPPED | OUTPATIENT
Start: 2022-03-04

## 2022-03-04 RX ORDER — AMOXICILLIN 500 MG/1
TABLET, FILM COATED ORAL
Qty: 21 TABLET | Refills: 0 | Status: SHIPPED | OUTPATIENT
Start: 2022-03-04

## 2022-03-08 ENCOUNTER — HOSPITAL ENCOUNTER (OUTPATIENT)
Dept: LAB | Facility: HOSPITAL | Age: 77
Discharge: HOME | End: 2022-03-08
Attending: INTERNAL MEDICINE
Payer: MEDICARE

## 2022-03-08 DIAGNOSIS — I48.0 PAROXYSMAL ATRIAL FIBRILLATION (HCC): ICD-10-CM

## 2022-03-08 DIAGNOSIS — I48.92 ATRIAL FLUTTER (HCC): ICD-10-CM

## 2022-03-08 LAB — INR BLDC: 3.2 (ref 0.9–1.1)

## 2022-03-08 PROCEDURE — 85610 PROTHROMBIN TIME: CPT

## 2022-03-09 ENCOUNTER — APPOINTMENT (OUTPATIENT)
Dept: CT IMAGING | Facility: HOSPITAL | Age: 77
End: 2022-03-09
Attending: EMERGENCY MEDICINE
Payer: MEDICARE

## 2022-03-09 ENCOUNTER — HOSPITAL ENCOUNTER (EMERGENCY)
Facility: HOSPITAL | Age: 77
Discharge: HOME | End: 2022-03-09
Attending: EMERGENCY MEDICINE
Payer: MEDICARE

## 2022-03-09 ENCOUNTER — TELEPHONE (OUTPATIENT)
Dept: CASE MANAGEMENT | Facility: HOSPITAL | Age: 77
End: 2022-03-09

## 2022-03-09 VITALS
HEART RATE: 64 BPM | WEIGHT: 130 LBS | OXYGEN SATURATION: 95 % | DIASTOLIC BLOOD PRESSURE: 91 MMHG | HEIGHT: 64 IN | TEMPERATURE: 98 F | RESPIRATION RATE: 18 BRPM | SYSTOLIC BLOOD PRESSURE: 176 MMHG | BODY MASS INDEX: 22.2 KG/M2

## 2022-03-09 VITALS
HEART RATE: 60 BPM | TEMPERATURE: 98 F | DIASTOLIC BLOOD PRESSURE: 88 MMHG | RESPIRATION RATE: 16 BRPM | SYSTOLIC BLOOD PRESSURE: 122 MMHG

## 2022-03-09 DIAGNOSIS — M54.9 MID BACK PAIN: Primary | ICD-10-CM

## 2022-03-09 DIAGNOSIS — R11.2 NAUSEA AND VOMITING, UNSPECIFIED VOMITING TYPE: ICD-10-CM

## 2022-03-09 DIAGNOSIS — R10.9 ABDOMINAL PAIN, ACUTE: ICD-10-CM

## 2022-03-09 DIAGNOSIS — T88.7XXA MEDICATION SIDE EFFECT: ICD-10-CM

## 2022-03-09 LAB
ALBUMIN SERPL-MCNC: 3.9 G/DL (ref 3.4–5)
ALBUMIN/GLOB SERPL: 1 {RATIO} (ref 1–2)
ALP LIVER SERPL-CCNC: 74 U/L
ALT SERPL-CCNC: 39 U/L
ANION GAP SERPL CALC-SCNC: 4 MMOL/L (ref 0–18)
AST SERPL-CCNC: 35 U/L (ref 15–37)
BASOPHILS # BLD AUTO: 0 X10(3) UL (ref 0–0.2)
BASOPHILS NFR BLD AUTO: 0 %
BILIRUB SERPL-MCNC: 0.6 MG/DL (ref 0.1–2)
BILIRUB UR QL STRIP.AUTO: NEGATIVE
BUN BLD-MCNC: 22 MG/DL (ref 7–18)
CALCIUM BLD-MCNC: 9.4 MG/DL (ref 8.5–10.1)
CHLORIDE SERPL-SCNC: 107 MMOL/L (ref 98–112)
CLARITY UR REFRACT.AUTO: CLEAR
CO2 SERPL-SCNC: 27 MMOL/L (ref 21–32)
EOSINOPHIL # BLD AUTO: 0 X10(3) UL (ref 0–0.7)
EOSINOPHIL NFR BLD AUTO: 0 %
ERYTHROCYTE [DISTWIDTH] IN BLOOD BY AUTOMATED COUNT: 12.9 %
GLOBULIN PLAS-MCNC: 4.1 G/DL (ref 2.8–4.4)
GLUCOSE BLD-MCNC: 123 MG/DL (ref 70–99)
GLUCOSE UR STRIP.AUTO-MCNC: NEGATIVE MG/DL
HCT VFR BLD AUTO: 39.5 %
HGB BLD-MCNC: 13.4 G/DL
IMM GRANULOCYTES # BLD AUTO: 0.05 X10(3) UL (ref 0–1)
IMM GRANULOCYTES NFR BLD: 0.7 %
KETONES UR STRIP.AUTO-MCNC: NEGATIVE MG/DL
LEUKOCYTE ESTERASE UR QL STRIP.AUTO: NEGATIVE
LIPASE SERPL-CCNC: 256 U/L (ref 73–393)
LYMPHOCYTES NFR BLD AUTO: 18.4 %
MCH RBC QN AUTO: 29.5 PG (ref 26–34)
MCHC RBC AUTO-ENTMCNC: 33.9 G/DL (ref 31–37)
MCV RBC AUTO: 86.8 FL
MONOCYTES # BLD AUTO: 0.67 X10(3) UL (ref 0.1–1)
MONOCYTES NFR BLD AUTO: 9.9 %
NEUTROPHILS # BLD AUTO: 4.82 X10 (3) UL (ref 1.5–7.7)
NEUTROPHILS # BLD AUTO: 4.82 X10(3) UL (ref 1.5–7.7)
NEUTROPHILS NFR BLD AUTO: 71 %
NITRITE UR QL STRIP.AUTO: NEGATIVE
OSMOLALITY SERPL CALC.SUM OF ELEC: 291 MOSM/KG (ref 275–295)
PH UR STRIP.AUTO: 7 [PH] (ref 5–8)
PLATELET # BLD AUTO: 173 10(3)UL (ref 150–450)
POTASSIUM SERPL-SCNC: 4 MMOL/L (ref 3.5–5.1)
PROT SERPL-MCNC: 8 G/DL (ref 6.4–8.2)
PROT UR STRIP.AUTO-MCNC: NEGATIVE MG/DL
RBC # BLD AUTO: 4.55 X10(6)UL
RBC #/AREA URNS AUTO: >10 /HPF
SODIUM SERPL-SCNC: 138 MMOL/L (ref 136–145)
SP GR UR STRIP.AUTO: 1.01 (ref 1–1.03)
UROBILINOGEN UR STRIP.AUTO-MCNC: <2 MG/DL
WBC # BLD AUTO: 6.8 X10(3) UL (ref 4–11)

## 2022-03-09 PROCEDURE — 99284 EMERGENCY DEPT VISIT MOD MDM: CPT

## 2022-03-09 PROCEDURE — 96361 HYDRATE IV INFUSION ADD-ON: CPT

## 2022-03-09 PROCEDURE — 80053 COMPREHEN METABOLIC PANEL: CPT | Performed by: EMERGENCY MEDICINE

## 2022-03-09 PROCEDURE — 74176 CT ABD & PELVIS W/O CONTRAST: CPT | Performed by: EMERGENCY MEDICINE

## 2022-03-09 PROCEDURE — 85025 COMPLETE CBC W/AUTO DIFF WBC: CPT | Performed by: EMERGENCY MEDICINE

## 2022-03-09 PROCEDURE — 96374 THER/PROPH/DIAG INJ IV PUSH: CPT

## 2022-03-09 PROCEDURE — 83690 ASSAY OF LIPASE: CPT | Performed by: EMERGENCY MEDICINE

## 2022-03-09 PROCEDURE — 96375 TX/PRO/DX INJ NEW DRUG ADDON: CPT

## 2022-03-09 PROCEDURE — 81001 URINALYSIS AUTO W/SCOPE: CPT | Performed by: EMERGENCY MEDICINE

## 2022-03-09 RX ORDER — MORPHINE SULFATE 2 MG/ML
2 INJECTION, SOLUTION INTRAMUSCULAR; INTRAVENOUS ONCE
Status: DISCONTINUED | OUTPATIENT
Start: 2022-03-09 | End: 2022-03-09

## 2022-03-09 RX ORDER — ONDANSETRON 4 MG/1
4 TABLET, ORALLY DISINTEGRATING ORAL EVERY 8 HOURS PRN
Qty: 10 TABLET | Refills: 0 | Status: SHIPPED | OUTPATIENT
Start: 2022-03-09

## 2022-03-09 RX ORDER — HYDROCODONE BITARTRATE AND ACETAMINOPHEN 5; 325 MG/1; MG/1
1 TABLET ORAL EVERY 6 HOURS PRN
Qty: 20 TABLET | Refills: 0 | Status: SHIPPED | OUTPATIENT
Start: 2022-03-09

## 2022-03-09 RX ORDER — ORPHENADRINE CITRATE 100 MG/1
100 TABLET, EXTENDED RELEASE ORAL 2 TIMES DAILY PRN
Qty: 20 TABLET | Refills: 0 | Status: SHIPPED | OUTPATIENT
Start: 2022-03-09 | End: 2022-03-09

## 2022-03-09 RX ORDER — MORPHINE SULFATE 2 MG/ML
2 INJECTION, SOLUTION INTRAMUSCULAR; INTRAVENOUS ONCE
Status: COMPLETED | OUTPATIENT
Start: 2022-03-09 | End: 2022-03-09

## 2022-03-09 RX ORDER — MORPHINE SULFATE 2 MG/ML
INJECTION, SOLUTION INTRAMUSCULAR; INTRAVENOUS
Status: DISCONTINUED
Start: 2022-03-09 | End: 2022-03-09

## 2022-03-09 RX ORDER — ORPHENADRINE CITRATE 30 MG/ML
30 INJECTION INTRAMUSCULAR; INTRAVENOUS ONCE
Status: COMPLETED | OUTPATIENT
Start: 2022-03-09 | End: 2022-03-09

## 2022-03-09 RX ORDER — ONDANSETRON 2 MG/ML
4 INJECTION INTRAMUSCULAR; INTRAVENOUS ONCE
Status: COMPLETED | OUTPATIENT
Start: 2022-03-09 | End: 2022-03-09

## 2022-03-09 RX ORDER — LIDOCAINE 50 MG/G
1 PATCH TOPICAL EVERY 24 HOURS
Qty: 30 PATCH | Refills: 0 | Status: SHIPPED | OUTPATIENT
Start: 2022-03-09

## 2022-03-09 NOTE — PROGRESS NOTES
[facility] pharmacy did not get Zofran RX. Pharmacy did receive per [employer]. [id number], [phone]. Per Essence Powers received, insurance requiring prior auth. Sherita Ocasio will use coupon to fill and patient can . Called patient back.  verbalized understanding.

## 2022-03-09 NOTE — ED INITIAL ASSESSMENT (HPI)
Lower abd pain radiating to back started [id number]. + n/V.  Started on amoxicillin and prednisone on Friday for ear infection

## 2022-03-15 ENCOUNTER — HOSPITAL ENCOUNTER (OUTPATIENT)
Dept: LAB | Facility: HOSPITAL | Age: 77
Discharge: HOME | End: 2022-03-15
Attending: INTERNAL MEDICINE
Payer: MEDICARE

## 2022-03-15 DIAGNOSIS — I48.92 ATRIAL FLUTTER (HCC): ICD-10-CM

## 2022-03-15 DIAGNOSIS — I48.0 PAROXYSMAL ATRIAL FIBRILLATION (HCC): ICD-10-CM

## 2022-03-15 LAB — INR BLDC: 2.6 (ref 0.9–1.1)

## 2022-03-15 PROCEDURE — 85610 PROTHROMBIN TIME: CPT

## 2022-04-13 ENCOUNTER — HOSPITAL ENCOUNTER (OUTPATIENT)
Dept: LAB | Facility: HOSPITAL | Age: 77
Discharge: HOME | End: 2022-04-13
Attending: INTERNAL MEDICINE
Payer: MEDICARE

## 2022-04-13 DIAGNOSIS — I48.92 ATRIAL FLUTTER (HCC): ICD-10-CM

## 2022-04-13 DIAGNOSIS — I48.0 PAROXYSMAL ATRIAL FIBRILLATION (HCC): ICD-10-CM

## 2022-04-13 LAB — INR BLDC: 2.7 (ref 0.9–1.1)

## 2022-04-13 PROCEDURE — 85610 PROTHROMBIN TIME: CPT

## 2022-04-20 ENCOUNTER — HOSPITAL ENCOUNTER (OUTPATIENT)
Dept: LAB | Facility: HOSPITAL | Age: 77
Discharge: HOME | End: 2022-04-20
Attending: INTERNAL MEDICINE
Payer: MEDICARE

## 2022-04-20 ENCOUNTER — HOSPITAL ENCOUNTER (OUTPATIENT)
Dept: GENERAL RADIOLOGY | Facility: HOSPITAL | Age: 77
Discharge: HOME | End: 2022-04-20
Attending: PEDIATRICS
Payer: MEDICARE

## 2022-04-20 DIAGNOSIS — I48.0 PAROXYSMAL ATRIAL FIBRILLATION (HCC): ICD-10-CM

## 2022-04-20 DIAGNOSIS — I10 HYPERTENSION: ICD-10-CM

## 2022-04-20 DIAGNOSIS — T46.2X5A HYPERTHYROIDISM SECONDARY TO AMIODARONE: ICD-10-CM

## 2022-04-20 DIAGNOSIS — E05.80 HYPERTHYROIDISM SECONDARY TO AMIODARONE: ICD-10-CM

## 2022-04-20 DIAGNOSIS — Z51.81 MEDICATION MONITORING ENCOUNTER: ICD-10-CM

## 2022-04-20 DIAGNOSIS — I25.10 CAD (CORONARY ARTERY DISEASE): ICD-10-CM

## 2022-04-20 LAB — TSI SER-ACNC: 2.11 MIU/ML (ref 0.36–3.74)

## 2022-04-20 PROCEDURE — 84443 ASSAY THYROID STIM HORMONE: CPT

## 2022-04-20 PROCEDURE — 36415 COLL VENOUS BLD VENIPUNCTURE: CPT

## 2022-04-20 PROCEDURE — 71046 X-RAY EXAM CHEST 2 VIEWS: CPT | Performed by: INTERNAL MEDICINE

## 2022-06-01 ENCOUNTER — HOSPITAL ENCOUNTER (OUTPATIENT)
Dept: LAB | Facility: HOSPITAL | Age: 77
Discharge: HOME | End: 2022-06-01
Attending: INTERNAL MEDICINE
Payer: MEDICARE

## 2022-06-01 LAB — INR BLDC: 2.4 (ref 0.9–1.1)

## 2022-06-01 PROCEDURE — 85610 PROTHROMBIN TIME: CPT | Performed by: INTERNAL MEDICINE

## 2022-06-29 ENCOUNTER — HOSPITAL ENCOUNTER (OUTPATIENT)
Dept: LAB | Facility: HOSPITAL | Age: 77
Discharge: HOME | End: 2022-06-29
Attending: INTERNAL MEDICINE
Payer: MEDICARE

## 2022-06-29 DIAGNOSIS — I48.0 PAROXYSMAL ATRIAL FIBRILLATION (HCC): ICD-10-CM

## 2022-06-29 LAB — INR BLDC: 2.2 (ref 0.9–1.1)

## 2022-06-29 PROCEDURE — 85610 PROTHROMBIN TIME: CPT | Performed by: INTERNAL MEDICINE

## 2022-07-26 ENCOUNTER — HOSPITAL ENCOUNTER (OUTPATIENT)
Dept: LAB | Facility: HOSPITAL | Age: 77
Discharge: HOME | End: 2022-07-26
Attending: INTERNAL MEDICINE
Payer: MEDICARE

## 2022-07-26 DIAGNOSIS — I48.0 PAROXYSMAL ATRIAL FIBRILLATION (HCC): ICD-10-CM

## 2022-07-26 LAB — INR BLDC: 3.2 (ref 0.9–1.1)

## 2022-07-26 PROCEDURE — 85610 PROTHROMBIN TIME: CPT | Performed by: INTERNAL MEDICINE

## 2022-08-09 ENCOUNTER — HOSPITAL ENCOUNTER (OUTPATIENT)
Dept: LAB | Facility: HOSPITAL | Age: 77
Discharge: HOME | End: 2022-08-09
Attending: INTERNAL MEDICINE
Payer: MEDICARE

## 2022-08-09 DIAGNOSIS — I48.0 PAROXYSMAL ATRIAL FIBRILLATION (HCC): ICD-10-CM

## 2022-08-09 LAB — INR BLDC: 2.5 (ref 0.9–1.1)

## 2022-08-09 PROCEDURE — 85610 PROTHROMBIN TIME: CPT | Performed by: INTERNAL MEDICINE

## 2022-09-06 ENCOUNTER — HOSPITAL ENCOUNTER (OUTPATIENT)
Dept: LAB | Facility: HOSPITAL | Age: 77
Discharge: HOME | End: 2022-09-06
Attending: INTERNAL MEDICINE
Payer: MEDICARE

## 2022-09-06 ENCOUNTER — APPOINTMENT (OUTPATIENT)
Dept: LAB | Facility: HOSPITAL | Age: 77
End: 2022-09-06
Attending: INTERNAL MEDICINE
Payer: MEDICARE

## 2022-09-06 DIAGNOSIS — I48.0 PAROXYSMAL ATRIAL FIBRILLATION (HCC): ICD-10-CM

## 2022-09-06 LAB — INR BLDC: 2.9 (ref 0.9–1.1)

## 2022-09-06 PROCEDURE — 85610 PROTHROMBIN TIME: CPT | Performed by: INTERNAL MEDICINE

## 2022-10-05 ENCOUNTER — HOSPITAL ENCOUNTER (OUTPATIENT)
Dept: LAB | Facility: HOSPITAL | Age: 77
Discharge: HOME | End: 2022-10-05
Attending: INTERNAL MEDICINE
Payer: MEDICARE

## 2022-10-05 DIAGNOSIS — I48.0 PAROXYSMAL ATRIAL FIBRILLATION (HCC): ICD-10-CM

## 2022-10-05 LAB — INR BLDC: 2.9 (ref 0.9–1.1)

## 2022-10-05 PROCEDURE — 85610 PROTHROMBIN TIME: CPT | Performed by: INTERNAL MEDICINE

## 2022-11-01 ENCOUNTER — HOSPITAL ENCOUNTER (OUTPATIENT)
Dept: LAB | Facility: HOSPITAL | Age: 77
Discharge: HOME | End: 2022-11-01
Attending: INTERNAL MEDICINE
Payer: MEDICARE

## 2022-11-01 DIAGNOSIS — I48.0 PAROXYSMAL ATRIAL FIBRILLATION (HCC): ICD-10-CM

## 2022-11-01 LAB — INR BLDC: 3.3 (ref 0.9–1.1)

## 2022-11-01 PROCEDURE — 85610 PROTHROMBIN TIME: CPT | Performed by: INTERNAL MEDICINE

## 2022-11-09 ENCOUNTER — HOSPITAL ENCOUNTER (OUTPATIENT)
Dept: LAB | Facility: HOSPITAL | Age: 77
Discharge: HOME | End: 2022-11-09
Attending: INTERNAL MEDICINE
Payer: MEDICARE

## 2022-11-09 DIAGNOSIS — I48.0 PAROXYSMAL ATRIAL FIBRILLATION (HCC): ICD-10-CM

## 2022-11-09 LAB — INR BLDC: 2.5 (ref 0.9–1.1)

## 2022-11-09 PROCEDURE — 85610 PROTHROMBIN TIME: CPT | Performed by: INTERNAL MEDICINE

## 2022-11-16 ENCOUNTER — HOSPITAL ENCOUNTER (OUTPATIENT)
Dept: MAMMOGRAPHY | Facility: HOSPITAL | Age: 77
Discharge: HOME | End: 2022-11-16
Attending: INTERNAL MEDICINE
Payer: MEDICARE

## 2022-11-16 ENCOUNTER — TELEPHONE (OUTPATIENT)
Dept: HEMATOLOGY/ONCOLOGY | Facility: HOSPITAL | Age: 77
End: 2022-11-16

## 2022-11-16 ENCOUNTER — HOSPITAL ENCOUNTER (OUTPATIENT)
Dept: BONE DENSITY | Age: 77
Discharge: HOME | End: 2022-11-16
Attending: INTERNAL MEDICINE
Payer: MEDICARE

## 2022-11-16 ENCOUNTER — HOSPITAL ENCOUNTER (OUTPATIENT)
Dept: LAB | Facility: HOSPITAL | Age: 77
Discharge: HOME | End: 2022-11-16
Attending: INTERNAL MEDICINE
Payer: MEDICARE

## 2022-11-16 DIAGNOSIS — Z12.31 SCREENING MAMMOGRAM FOR BREAST CANCER: ICD-10-CM

## 2022-11-16 DIAGNOSIS — I48.0 PAROXYSMAL ATRIAL FIBRILLATION (HCC): ICD-10-CM

## 2022-11-16 DIAGNOSIS — Z78.0 POST-MENOPAUSAL: ICD-10-CM

## 2022-11-16 LAB — INR BLDC: 2.2 (ref 0.9–1.1)

## 2022-11-16 PROCEDURE — 85610 PROTHROMBIN TIME: CPT | Performed by: INTERNAL MEDICINE

## 2022-11-16 PROCEDURE — 77067 SCR MAMMO BI INCL CAD: CPT | Performed by: INTERNAL MEDICINE

## 2022-11-16 PROCEDURE — 77063 BREAST TOMOSYNTHESIS BI: CPT | Performed by: INTERNAL MEDICINE

## 2022-11-16 PROCEDURE — 77080 DXA BONE DENSITY AXIAL: CPT | Performed by: INTERNAL MEDICINE

## 2022-11-30 ENCOUNTER — HOSPITAL ENCOUNTER (OUTPATIENT)
Dept: LAB | Facility: HOSPITAL | Age: 77
Discharge: HOME | End: 2022-11-30
Attending: INTERNAL MEDICINE
Payer: MEDICARE

## 2022-11-30 DIAGNOSIS — I48.0 PAROXYSMAL ATRIAL FIBRILLATION (HCC): ICD-10-CM

## 2022-11-30 LAB
INR BLD: 3.64 (ref 0.85–1.16)
INR BLDC: 4.6 (ref 0.9–1.1)
PROTHROMBIN TIME: 35.6 SECONDS (ref 11.6–14.8)

## 2022-11-30 PROCEDURE — 85610 PROTHROMBIN TIME: CPT | Performed by: INTERNAL MEDICINE

## 2022-11-30 PROCEDURE — 36415 COLL VENOUS BLD VENIPUNCTURE: CPT | Performed by: INTERNAL MEDICINE

## 2022-12-02 ENCOUNTER — HOSPITAL ENCOUNTER (OUTPATIENT)
Dept: LAB | Facility: HOSPITAL | Age: 77
Discharge: HOME | End: 2022-12-02
Attending: INTERNAL MEDICINE
Payer: MEDICARE

## 2022-12-02 DIAGNOSIS — I48.0 PAROXYSMAL ATRIAL FIBRILLATION (HCC): ICD-10-CM

## 2022-12-02 LAB — INR BLDC: 3.8 (ref 0.9–1.1)

## 2022-12-02 PROCEDURE — 85610 PROTHROMBIN TIME: CPT | Performed by: INTERNAL MEDICINE

## 2022-12-07 ENCOUNTER — HOSPITAL ENCOUNTER (OUTPATIENT)
Dept: LAB | Facility: HOSPITAL | Age: 77
Discharge: HOME | End: 2022-12-07
Attending: INTERNAL MEDICINE
Payer: MEDICARE

## 2022-12-07 DIAGNOSIS — I48.0 PAROXYSMAL ATRIAL FIBRILLATION (HCC): ICD-10-CM

## 2022-12-07 LAB — INR BLDC: 2.6 (ref 0.9–1.1)

## 2022-12-07 PROCEDURE — 85610 PROTHROMBIN TIME: CPT | Performed by: INTERNAL MEDICINE

## 2022-12-13 ENCOUNTER — HOSPITAL ENCOUNTER (OUTPATIENT)
Dept: LAB | Facility: HOSPITAL | Age: 77
Discharge: HOME | End: 2022-12-13
Attending: INTERNAL MEDICINE
Payer: MEDICARE

## 2022-12-13 DIAGNOSIS — I48.0 PAROXYSMAL ATRIAL FIBRILLATION (HCC): ICD-10-CM

## 2022-12-13 LAB — INR BLDC: 2.2 (ref 0.9–1.1)

## 2022-12-13 PROCEDURE — 85610 PROTHROMBIN TIME: CPT | Performed by: INTERNAL MEDICINE

## 2022-12-21 ENCOUNTER — HOSPITAL ENCOUNTER (OUTPATIENT)
Dept: LAB | Facility: HOSPITAL | Age: 77
Discharge: HOME | End: 2022-12-21
Attending: INTERNAL MEDICINE
Payer: MEDICARE

## 2022-12-21 DIAGNOSIS — I48.0 PAROXYSMAL ATRIAL FIBRILLATION (HCC): ICD-10-CM

## 2022-12-21 LAB — INR BLDC: 2.9 (ref 0.9–1.1)

## 2022-12-21 PROCEDURE — 85610 PROTHROMBIN TIME: CPT | Performed by: INTERNAL MEDICINE

## 2022-12-28 ENCOUNTER — HOSPITAL ENCOUNTER (OUTPATIENT)
Dept: LAB | Facility: HOSPITAL | Age: 77
Discharge: HOME | End: 2022-12-28
Attending: INTERNAL MEDICINE
Payer: MEDICARE

## 2022-12-28 DIAGNOSIS — I48.0 PAROXYSMAL ATRIAL FIBRILLATION (HCC): ICD-10-CM

## 2022-12-28 LAB — INR BLDC: 3.1 (ref 0.9–1.1)

## 2022-12-28 PROCEDURE — 85610 PROTHROMBIN TIME: CPT | Performed by: INTERNAL MEDICINE

## 2023-01-04 ENCOUNTER — HOSPITAL ENCOUNTER (OUTPATIENT)
Dept: LAB | Facility: HOSPITAL | Age: 78
Discharge: HOME | End: 2023-01-04
Attending: INTERNAL MEDICINE
Payer: MEDICARE

## 2023-01-04 DIAGNOSIS — I48.0 PAROXYSMAL ATRIAL FIBRILLATION (HCC): ICD-10-CM

## 2023-01-04 LAB — INR BLDC: 2.1 (ref 0.9–1.1)

## 2023-01-04 PROCEDURE — 85610 PROTHROMBIN TIME: CPT | Performed by: INTERNAL MEDICINE

## 2023-01-18 ENCOUNTER — HOSPITAL ENCOUNTER (OUTPATIENT)
Dept: LAB | Facility: HOSPITAL | Age: 78
Discharge: HOME | End: 2023-01-18
Attending: INTERNAL MEDICINE
Payer: MEDICARE

## 2023-01-18 DIAGNOSIS — I48.0 PAROXYSMAL ATRIAL FIBRILLATION (HCC): ICD-10-CM

## 2023-01-18 LAB — INR BLDC: 2.4 (ref 0.9–1.1)

## 2023-01-18 PROCEDURE — 85610 PROTHROMBIN TIME: CPT | Performed by: INTERNAL MEDICINE

## 2023-02-15 ENCOUNTER — HOSPITAL ENCOUNTER (OUTPATIENT)
Dept: LAB | Facility: HOSPITAL | Age: 78
Discharge: HOME | End: 2023-02-15
Attending: INTERNAL MEDICINE
Payer: MEDICARE

## 2023-02-15 ENCOUNTER — OFFICE VISIT (OUTPATIENT)
Dept: HEMATOLOGY/ONCOLOGY | Facility: HOSPITAL | Age: 78
End: 2023-02-15
Attending: INTERNAL MEDICINE
Payer: MEDICARE

## 2023-02-15 VITALS
HEIGHT: 62.6 IN | HEART RATE: 57 BPM | OXYGEN SATURATION: 97 % | BODY MASS INDEX: 22.52 KG/M2 | SYSTOLIC BLOOD PRESSURE: 119 MMHG | DIASTOLIC BLOOD PRESSURE: 69 MMHG | RESPIRATION RATE: 16 BRPM | TEMPERATURE: 97 F | WEIGHT: 125.5 LBS

## 2023-02-15 DIAGNOSIS — C50.919 BREAST CANCER (HCC): ICD-10-CM

## 2023-02-15 DIAGNOSIS — Z12.31 VISIT FOR SCREENING MAMMOGRAM: Primary | ICD-10-CM

## 2023-02-15 DIAGNOSIS — M85.80 OSTEOPENIA, UNSPECIFIED LOCATION: ICD-10-CM

## 2023-02-15 DIAGNOSIS — I48.0 PAROXYSMAL ATRIAL FIBRILLATION (HCC): ICD-10-CM

## 2023-02-15 LAB — INR BLDC: 2 (ref 0.9–1.1)

## 2023-02-15 PROCEDURE — 85610 PROTHROMBIN TIME: CPT | Performed by: INTERNAL MEDICINE

## 2023-02-15 PROCEDURE — 99214 OFFICE O/P EST MOD 30 MIN: CPT | Performed by: INTERNAL MEDICINE

## 2023-02-15 RX ORDER — WARFARIN SODIUM 2.5 MG/1
TABLET ORAL
COMMUNITY
Start: 2022-12-28

## 2023-02-15 RX ORDER — ANASTROZOLE 1 MG/1
1 TABLET ORAL DAILY
Qty: 90 TABLET | Refills: 3 | Status: SHIPPED | OUTPATIENT
Start: 2023-02-15

## 2023-02-15 NOTE — PROGRESS NOTES
Education Record    Learner:  Patient/ spouse    Disease / [address] breast cancer      Barriers / Limitations:  None   Comments:    Method:  Discussion   Comments:    General Topics:  Plan of care reviewed   Comments:    Outcome:  Shows understanding   Comments:  Breast imaging up to date. Taking anastrozole. Denies side effects.

## 2023-03-01 ENCOUNTER — HOSPITAL ENCOUNTER (OUTPATIENT)
Dept: LAB | Facility: HOSPITAL | Age: 78
Discharge: HOME | End: 2023-03-01
Attending: INTERNAL MEDICINE
Payer: MEDICARE

## 2023-03-01 DIAGNOSIS — I48.0 PAROXYSMAL ATRIAL FIBRILLATION (HCC): ICD-10-CM

## 2023-03-01 LAB — INR BLDC: 2.6 (ref 0.9–1.1)

## 2023-03-01 PROCEDURE — 85610 PROTHROMBIN TIME: CPT | Performed by: INTERNAL MEDICINE

## 2023-03-29 ENCOUNTER — HOSPITAL ENCOUNTER (OUTPATIENT)
Dept: LAB | Facility: HOSPITAL | Age: 78
Discharge: HOME | End: 2023-03-29
Attending: INTERNAL MEDICINE
Payer: MEDICARE

## 2023-03-29 DIAGNOSIS — I48.0 PAROXYSMAL ATRIAL FIBRILLATION (HCC): ICD-10-CM

## 2023-03-29 LAB — INR BLDC: 2.4 (ref 0.9–1.1)

## 2023-03-29 PROCEDURE — 85610 PROTHROMBIN TIME: CPT | Performed by: INTERNAL MEDICINE

## 2023-04-26 ENCOUNTER — HOSPITAL ENCOUNTER (OUTPATIENT)
Dept: LAB | Facility: HOSPITAL | Age: 78
Discharge: HOME | End: 2023-04-26
Attending: INTERNAL MEDICINE
Payer: MEDICARE

## 2023-04-26 DIAGNOSIS — I48.0 PAROXYSMAL ATRIAL FIBRILLATION (HCC): ICD-10-CM

## 2023-04-26 LAB — INR BLDC: 2.7 (ref 0.9–1.1)

## 2023-04-26 PROCEDURE — 85610 PROTHROMBIN TIME: CPT | Performed by: INTERNAL MEDICINE

## 2023-05-24 ENCOUNTER — HOSPITAL ENCOUNTER (OUTPATIENT)
Dept: LAB | Facility: HOSPITAL | Age: 78
Discharge: HOME | End: 2023-05-24
Attending: INTERNAL MEDICINE
Payer: MEDICARE

## 2023-05-24 DIAGNOSIS — I48.0 PAROXYSMAL ATRIAL FIBRILLATION (HCC): ICD-10-CM

## 2023-05-24 LAB — INR BLDC: 3.1 (ref 0.9–1.1)

## 2023-05-24 PROCEDURE — 85610 PROTHROMBIN TIME: CPT | Performed by: INTERNAL MEDICINE

## 2023-06-02 ENCOUNTER — HOSPITAL ENCOUNTER (OUTPATIENT)
Dept: LAB | Facility: HOSPITAL | Age: 78
Discharge: HOME | End: 2023-06-02
Attending: PEDIATRICS
Payer: MEDICARE

## 2023-06-02 DIAGNOSIS — I48.0 PAROXYSMAL ATRIAL FIBRILLATION (HCC): ICD-10-CM

## 2023-06-02 LAB — INR BLDC: 3.7 (ref 0.9–1.1)

## 2023-06-02 PROCEDURE — 85610 PROTHROMBIN TIME: CPT

## 2023-06-09 ENCOUNTER — HOSPITAL ENCOUNTER (OUTPATIENT)
Dept: LAB | Facility: HOSPITAL | Age: 78
Discharge: HOME | End: 2023-06-09
Attending: INTERNAL MEDICINE
Payer: MEDICARE

## 2023-06-09 DIAGNOSIS — I48.0 PAROXYSMAL ATRIAL FIBRILLATION (HCC): ICD-10-CM

## 2023-06-09 LAB — INR BLDC: 2.6 (ref 0.9–1.1)

## 2023-06-09 PROCEDURE — 85610 PROTHROMBIN TIME: CPT

## 2023-06-21 ENCOUNTER — HOSPITAL ENCOUNTER (OUTPATIENT)
Dept: LAB | Facility: HOSPITAL | Age: 78
Discharge: HOME | End: 2023-06-21
Attending: INTERNAL MEDICINE
Payer: MEDICARE

## 2023-06-21 DIAGNOSIS — I48.0 PAROXYSMAL ATRIAL FIBRILLATION (HCC): ICD-10-CM

## 2023-06-21 LAB — INR BLDC: 2.2 (ref 0.9–1.1)

## 2023-06-21 PROCEDURE — 85610 PROTHROMBIN TIME: CPT

## 2023-07-19 ENCOUNTER — HOSPITAL ENCOUNTER (OUTPATIENT)
Dept: LAB | Facility: HOSPITAL | Age: 78
Discharge: HOME | End: 2023-07-19
Attending: INTERNAL MEDICINE
Payer: MEDICARE

## 2023-07-19 DIAGNOSIS — I48.0 PAROXYSMAL ATRIAL FIBRILLATION (HCC): ICD-10-CM

## 2023-07-19 LAB — INR BLDC: 2.2 (ref 0.9–1.1)

## 2023-07-19 PROCEDURE — 85610 PROTHROMBIN TIME: CPT

## 2023-08-10 ENCOUNTER — HOSPITAL ENCOUNTER (OUTPATIENT)
Dept: LAB | Facility: HOSPITAL | Age: 78
Discharge: HOME | End: 2023-08-10
Attending: INTERNAL MEDICINE
Payer: MEDICARE

## 2023-08-10 DIAGNOSIS — I48.0 PAROXYSMAL ATRIAL FIBRILLATION (HCC): ICD-10-CM

## 2023-08-10 LAB — INR BLDC: 2.8 (ref 0.9–1.1)

## 2023-08-10 PROCEDURE — 85610 PROTHROMBIN TIME: CPT

## 2023-08-16 ENCOUNTER — HOSPITAL ENCOUNTER (OUTPATIENT)
Dept: LAB | Facility: HOSPITAL | Age: 78
Discharge: HOME | End: 2023-08-16
Attending: INTERNAL MEDICINE
Payer: MEDICARE

## 2023-08-16 DIAGNOSIS — I48.0 PAROXYSMAL ATRIAL FIBRILLATION (HCC): ICD-10-CM

## 2023-08-16 LAB — INR BLDC: 2.5 (ref 0.9–1.1)

## 2023-08-16 PROCEDURE — 85610 PROTHROMBIN TIME: CPT

## 2023-09-12 ENCOUNTER — HOSPITAL ENCOUNTER (OUTPATIENT)
Dept: LAB | Facility: HOSPITAL | Age: 78
Discharge: HOME | End: 2023-09-12
Attending: INTERNAL MEDICINE
Payer: MEDICARE

## 2023-09-12 DIAGNOSIS — I48.0 PAROXYSMAL ATRIAL FIBRILLATION (HCC): ICD-10-CM

## 2023-09-12 LAB — INR BLDC: 2.4 (ref 0.9–1.1)

## 2023-09-12 PROCEDURE — 85610 PROTHROMBIN TIME: CPT

## 2023-10-09 ENCOUNTER — HOSPITAL ENCOUNTER (OUTPATIENT)
Dept: LAB | Facility: HOSPITAL | Age: 78
Discharge: HOME | End: 2023-10-09
Attending: INTERNAL MEDICINE
Payer: MEDICARE

## 2023-10-09 DIAGNOSIS — I48.0 PAROXYSMAL ATRIAL FIBRILLATION (HCC): ICD-10-CM

## 2023-10-09 LAB — INR BLDC: 2.8 (ref 0.9–1.1)

## 2023-10-09 PROCEDURE — 85610 PROTHROMBIN TIME: CPT

## 2023-11-07 ENCOUNTER — HOSPITAL ENCOUNTER (OUTPATIENT)
Dept: LAB | Facility: HOSPITAL | Age: 78
Discharge: HOME | End: 2023-11-07
Attending: INTERNAL MEDICINE
Payer: MEDICARE

## 2023-11-07 DIAGNOSIS — I48.0 PAROXYSMAL ATRIAL FIBRILLATION (HCC): ICD-10-CM

## 2023-11-07 LAB — INR BLDC: 2.7 (ref 0.9–1.1)

## 2023-11-07 PROCEDURE — 85610 PROTHROMBIN TIME: CPT

## 2023-11-29 ENCOUNTER — HOSPITAL ENCOUNTER (OUTPATIENT)
Dept: MAMMOGRAPHY | Facility: HOSPITAL | Age: 78
Discharge: HOME | End: 2023-11-29
Attending: INTERNAL MEDICINE
Payer: MEDICARE

## 2023-11-29 DIAGNOSIS — Z12.31 VISIT FOR SCREENING MAMMOGRAM: ICD-10-CM

## 2023-11-29 PROCEDURE — 77063 BREAST TOMOSYNTHESIS BI: CPT | Performed by: INTERNAL MEDICINE

## 2023-11-29 PROCEDURE — 77067 SCR MAMMO BI INCL CAD: CPT | Performed by: INTERNAL MEDICINE

## 2023-12-04 ENCOUNTER — TELEPHONE (OUTPATIENT)
Dept: HEMATOLOGY/ONCOLOGY | Age: 78
End: 2023-12-04

## 2023-12-04 NOTE — TELEPHONE ENCOUNTER
[facility] verbal release reviewed     LVM to notify that Dr Becka Marina reviewed aniket's mammogram and it's stable.    Asked to call back with any questions,   Annual FU  in Feb.

## 2023-12-06 ENCOUNTER — HOSPITAL ENCOUNTER (OUTPATIENT)
Dept: LAB | Facility: HOSPITAL | Age: 78
Discharge: HOME | End: 2023-12-06
Attending: INTERNAL MEDICINE
Payer: MEDICARE

## 2023-12-06 DIAGNOSIS — I48.0 PAROXYSMAL ATRIAL FIBRILLATION (HCC): ICD-10-CM

## 2023-12-06 LAB — INR BLDC: 2.4 (ref 0.9–1.1)

## 2023-12-06 PROCEDURE — 85610 PROTHROMBIN TIME: CPT

## 2024-01-03 ENCOUNTER — HOSPITAL ENCOUNTER (OUTPATIENT)
Dept: LAB | Facility: HOSPITAL | Age: 79
Discharge: HOME | End: 2024-01-03
Attending: INTERNAL MEDICINE
Payer: MEDICARE

## 2024-01-03 DIAGNOSIS — I48.0 PAROXYSMAL ATRIAL FIBRILLATION (HCC): ICD-10-CM

## 2024-01-03 LAB — INR BLDC: 2.1 (ref 0.9–1.1)

## 2024-01-03 PROCEDURE — 85610 PROTHROMBIN TIME: CPT

## 2024-01-21 ENCOUNTER — HOSPITAL ENCOUNTER (INPATIENT)
Facility: HOSPITAL | Age: 79
LOS: 2 days | Discharge: HOME | End: 2024-01-23
Attending: STUDENT IN AN ORGANIZED HEALTH CARE EDUCATION/TRAINING PROGRAM | Admitting: STUDENT IN AN ORGANIZED HEALTH CARE EDUCATION/TRAINING PROGRAM
Payer: MEDICARE

## 2024-01-21 ENCOUNTER — APPOINTMENT (OUTPATIENT)
Dept: GENERAL RADIOLOGY | Facility: HOSPITAL | Age: 79
End: 2024-01-21
Payer: MEDICARE

## 2024-01-21 DIAGNOSIS — C50.919 BREAST CANCER (HCC): ICD-10-CM

## 2024-01-21 DIAGNOSIS — I48.91 ATRIAL FIBRILLATION WITH RAPID VENTRICULAR RESPONSE (HCC): ICD-10-CM

## 2024-01-21 DIAGNOSIS — R07.9 CHEST PAIN, RULE OUT ACUTE MYOCARDIAL INFARCTION: Primary | ICD-10-CM

## 2024-01-21 LAB
ALBUMIN SERPL-MCNC: 3.9 G/DL (ref 3.4–5)
ALBUMIN/GLOB SERPL: 1.1 {RATIO} (ref 1–2)
ALP LIVER SERPL-CCNC: 102 U/L
ANION GAP SERPL CALC-SCNC: 8 MMOL/L (ref 0–18)
AST SERPL-CCNC: 80 U/L (ref 15–37)
BASOPHILS # BLD AUTO: 0.02 X10(3) UL (ref 0–0.2)
BASOPHILS NFR BLD AUTO: 0.5 %
BILIRUB SERPL-MCNC: 0.7 MG/DL (ref 0.1–2)
BUN BLD-MCNC: 25 MG/DL (ref 9–23)
CALCIUM BLD-MCNC: 9.4 MG/DL (ref 8.5–10.1)
CHLORIDE SERPL-SCNC: 112 MMOL/L (ref 98–112)
CHOLEST SERPL-MCNC: 145 MG/DL (ref ?–200)
CO2 SERPL-SCNC: 23 MMOL/L (ref 21–32)
CREAT BLD-MCNC: 1.15 MG/DL
EGFRCR SERPLBLD CKD-EPI 2021: 49 ML/MIN/1.73M2 (ref 60–?)
EOSINOPHIL # BLD AUTO: 0.01 X10(3) UL (ref 0–0.7)
EOSINOPHIL NFR BLD AUTO: 0.2 %
ERYTHROCYTE [DISTWIDTH] IN BLOOD BY AUTOMATED COUNT: 13.2 %
FLUAV + FLUBV RNA SPEC NAA+PROBE: NEGATIVE
FLUAV + FLUBV RNA SPEC NAA+PROBE: NEGATIVE
GLOBULIN PLAS-MCNC: 3.6 G/DL (ref 2.8–4.4)
GLUCOSE BLD-MCNC: 137 MG/DL (ref 70–99)
HCT VFR BLD AUTO: 42.2 %
HDLC SERPL-MCNC: 83 MG/DL (ref 40–59)
HGB BLD-MCNC: 13.4 G/DL
IMM GRANULOCYTES # BLD AUTO: 0.01 X10(3) UL (ref 0–1)
IMM GRANULOCYTES NFR BLD: 0.2 %
INR BLD: 2.8 (ref 0.8–1.2)
LDLC SERPL CALC-MCNC: 46 MG/DL (ref ?–100)
LYMPHOCYTES # BLD AUTO: 1.57 X10(3) UL (ref 1–4)
LYMPHOCYTES NFR BLD AUTO: 37.6 %
MCH RBC QN AUTO: 28.6 PG (ref 26–34)
MCHC RBC AUTO-ENTMCNC: 31.8 G/DL (ref 31–37)
MCV RBC AUTO: 90.2 FL
MONOCYTES # BLD AUTO: 0.98 X10(3) UL (ref 0.1–1)
MONOCYTES NFR BLD AUTO: 23.5 %
NEUTROPHILS # BLD AUTO: 1.58 X10 (3) UL (ref 1.5–7.7)
NEUTROPHILS # BLD AUTO: 1.58 X10(3) UL (ref 1.5–7.7)
NEUTROPHILS NFR BLD AUTO: 38 %
NONHDLC SERPL-MCNC: 62 MG/DL (ref ?–130)
NT-PROBNP SERPL-MCNC: 8949 PG/ML (ref ?–450)
OSMOLALITY SERPL CALC.SUM OF ELEC: 303 MOSM/KG (ref 275–295)
PLATELET # BLD AUTO: 142 10(3)UL (ref 150–450)
POTASSIUM SERPL-SCNC: 3.8 MMOL/L (ref 3.5–5.1)
PROT SERPL-MCNC: 7.5 G/DL (ref 6.4–8.2)
PROTHROMBIN TIME: 29.9 SECONDS (ref 11.6–14.8)
RBC # BLD AUTO: 4.68 X10(6)UL
RSV RNA SPEC NAA+PROBE: NEGATIVE
SARS-COV-2 RNA RESP QL NAA+PROBE: NOT DETECTED
SODIUM SERPL-SCNC: 143 MMOL/L (ref 136–145)
T4 FREE SERPL-MCNC: 1.6 NG/DL (ref 0.8–1.7)
TRIGL SERPL-MCNC: 88 MG/DL (ref 30–149)
TROPONIN I SERPL HS-MCNC: 41 NG/L
TROPONIN I SERPL HS-MCNC: 43 NG/L
TROPONIN I SERPL HS-MCNC: 68 NG/L
TSI SER-ACNC: 4.04 MIU/ML (ref 0.36–3.74)
VLDLC SERPL CALC-MCNC: 12 MG/DL (ref 0–30)
WBC # BLD AUTO: 4.2 X10(3) UL (ref 4–11)

## 2024-01-21 PROCEDURE — 96374 THER/PROPH/DIAG INJ IV PUSH: CPT

## 2024-01-21 PROCEDURE — 84484 ASSAY OF TROPONIN QUANT: CPT | Performed by: STUDENT IN AN ORGANIZED HEALTH CARE EDUCATION/TRAINING PROGRAM

## 2024-01-21 PROCEDURE — 0241U SARS-COV-2/FLU A AND B/RSV BY PCR (GENEXPERT): CPT | Performed by: STUDENT IN AN ORGANIZED HEALTH CARE EDUCATION/TRAINING PROGRAM

## 2024-01-21 PROCEDURE — 84439 ASSAY OF FREE THYROXINE: CPT | Performed by: STUDENT IN AN ORGANIZED HEALTH CARE EDUCATION/TRAINING PROGRAM

## 2024-01-21 PROCEDURE — 85025 COMPLETE CBC W/AUTO DIFF WBC: CPT | Performed by: STUDENT IN AN ORGANIZED HEALTH CARE EDUCATION/TRAINING PROGRAM

## 2024-01-21 PROCEDURE — 99285 EMERGENCY DEPT VISIT HI MDM: CPT

## 2024-01-21 PROCEDURE — 83880 ASSAY OF NATRIURETIC PEPTIDE: CPT | Performed by: STUDENT IN AN ORGANIZED HEALTH CARE EDUCATION/TRAINING PROGRAM

## 2024-01-21 PROCEDURE — 84443 ASSAY THYROID STIM HORMONE: CPT | Performed by: STUDENT IN AN ORGANIZED HEALTH CARE EDUCATION/TRAINING PROGRAM

## 2024-01-21 PROCEDURE — 93010 ELECTROCARDIOGRAM REPORT: CPT

## 2024-01-21 PROCEDURE — 80061 LIPID PANEL: CPT | Performed by: STUDENT IN AN ORGANIZED HEALTH CARE EDUCATION/TRAINING PROGRAM

## 2024-01-21 PROCEDURE — 80053 COMPREHEN METABOLIC PANEL: CPT | Performed by: STUDENT IN AN ORGANIZED HEALTH CARE EDUCATION/TRAINING PROGRAM

## 2024-01-21 PROCEDURE — 71045 X-RAY EXAM CHEST 1 VIEW: CPT | Performed by: STUDENT IN AN ORGANIZED HEALTH CARE EDUCATION/TRAINING PROGRAM

## 2024-01-21 PROCEDURE — 85610 PROTHROMBIN TIME: CPT | Performed by: STUDENT IN AN ORGANIZED HEALTH CARE EDUCATION/TRAINING PROGRAM

## 2024-01-21 PROCEDURE — 84484 ASSAY OF TROPONIN QUANT: CPT | Performed by: HOSPITALIST

## 2024-01-21 PROCEDURE — 93005 ELECTROCARDIOGRAM TRACING: CPT

## 2024-01-21 RX ORDER — WARFARIN SODIUM 5 MG/1
5 TABLET ORAL NIGHTLY
Status: DISCONTINUED | OUTPATIENT
Start: 2024-01-21 | End: 2024-01-21

## 2024-01-21 RX ORDER — ONDANSETRON 2 MG/ML
4 INJECTION INTRAMUSCULAR; INTRAVENOUS EVERY 6 HOURS PRN
Status: DISCONTINUED | OUTPATIENT
Start: 2024-01-21 | End: 2024-01-23

## 2024-01-21 RX ORDER — AMIODARONE HYDROCHLORIDE 100 MG/1
100 TABLET ORAL DAILY
Status: DISCONTINUED | OUTPATIENT
Start: 2024-01-22 | End: 2024-01-22

## 2024-01-21 RX ORDER — BISACODYL 10 MG
10 SUPPOSITORY, RECTAL RECTAL
Status: DISCONTINUED | OUTPATIENT
Start: 2024-01-21 | End: 2024-01-23

## 2024-01-21 RX ORDER — SENNOSIDES 8.6 MG
17.2 TABLET ORAL NIGHTLY PRN
Status: DISCONTINUED | OUTPATIENT
Start: 2024-01-21 | End: 2024-01-23

## 2024-01-21 RX ORDER — ACETAMINOPHEN 500 MG
500 TABLET ORAL EVERY 4 HOURS PRN
Status: DISCONTINUED | OUTPATIENT
Start: 2024-01-21 | End: 2024-01-23

## 2024-01-21 RX ORDER — NITROGLYCERIN 0.4 MG/1
0.4 TABLET SUBLINGUAL EVERY 5 MIN PRN
Status: DISCONTINUED | OUTPATIENT
Start: 2024-01-21 | End: 2024-01-23

## 2024-01-21 RX ORDER — MELATONIN
3 NIGHTLY PRN
Status: DISCONTINUED | OUTPATIENT
Start: 2024-01-21 | End: 2024-01-23

## 2024-01-21 RX ORDER — RAMIPRIL 5 MG/1
5 CAPSULE ORAL DAILY
Status: DISCONTINUED | OUTPATIENT
Start: 2024-01-22 | End: 2024-01-23

## 2024-01-21 RX ORDER — ATORVASTATIN CALCIUM 20 MG/1
20 TABLET, FILM COATED ORAL NIGHTLY
Status: DISCONTINUED | OUTPATIENT
Start: 2024-01-21 | End: 2024-01-23

## 2024-01-21 RX ORDER — FUROSEMIDE 10 MG/ML
40 INJECTION INTRAMUSCULAR; INTRAVENOUS ONCE
Qty: 4 ML | Refills: 0 | Status: COMPLETED | OUTPATIENT
Start: 2024-01-21 | End: 2024-01-21

## 2024-01-21 RX ORDER — METOPROLOL TARTRATE 1 MG/ML
5 INJECTION, SOLUTION INTRAVENOUS EVERY 6 HOURS PRN
Status: DISCONTINUED | OUTPATIENT
Start: 2024-01-21 | End: 2024-01-23

## 2024-01-21 RX ORDER — ANASTROZOLE 1 MG/1
1 TABLET ORAL DAILY
Status: DISCONTINUED | OUTPATIENT
Start: 2024-01-22 | End: 2024-01-23

## 2024-01-21 RX ORDER — METOPROLOL TARTRATE 1 MG/ML
5 INJECTION, SOLUTION INTRAVENOUS ONCE
Status: DISCONTINUED | OUTPATIENT
Start: 2024-01-21 | End: 2024-01-21

## 2024-01-21 RX ORDER — GUAIFENESIN 600 MG/1
600 TABLET, EXTENDED RELEASE ORAL 2 TIMES DAILY
Status: DISCONTINUED | OUTPATIENT
Start: 2024-01-21 | End: 2024-01-23

## 2024-01-21 RX ORDER — METOPROLOL TARTRATE 1 MG/ML
2.5 INJECTION, SOLUTION INTRAVENOUS ONCE
Status: COMPLETED | OUTPATIENT
Start: 2024-01-21 | End: 2024-01-21

## 2024-01-21 RX ORDER — POLYETHYLENE GLYCOL 3350 17 G/17G
17 POWDER, FOR SOLUTION ORAL DAILY PRN
Status: DISCONTINUED | OUTPATIENT
Start: 2024-01-21 | End: 2024-01-23

## 2024-01-21 RX ORDER — POTASSIUM CHLORIDE 20 MEQ/1
40 TABLET, EXTENDED RELEASE ORAL ONCE
Status: COMPLETED | OUTPATIENT
Start: 2024-01-21 | End: 2024-01-21

## 2024-01-21 RX ORDER — METOCLOPRAMIDE HYDROCHLORIDE 5 MG/ML
5 INJECTION INTRAMUSCULAR; INTRAVENOUS EVERY 8 HOURS PRN
Status: DISCONTINUED | OUTPATIENT
Start: 2024-01-21 | End: 2024-01-23

## 2024-01-21 RX ORDER — WARFARIN SODIUM 2 MG/1
2 TABLET ORAL
Status: COMPLETED | OUTPATIENT
Start: 2024-01-21 | End: 2024-01-21

## 2024-01-21 NOTE — ED PROVIDER NOTES
Patient Seen in: [facility] Emergency Department      History     Chief Complaint   Patient presents with    Difficulty Breathing     Stated Complaint: dc, dry cough, 96% ra    Subjective:   HPI    Patient presents to the emergency room with reports of shortness of breath and a dry cough.  She states she woke up and she felt palpitations and then felt like it was hard for her to fully exhale.  She notes for the last several days she has had a dry hacking cough.  She is also had some right sided lateral chest wall pain.    Objective:   Past Medical History:   Diagnosis Date    Arrhythmia     ATRIAL FIBRILLATION     Dr. Guerra    Atrial flutter (HCC)     Breast cancer (HCC) 2013    INVASIVE DUCTAL    CANCER 10/96    breast CIS- ?ductal     Cancer (HCC)     CORONARY ARTERY DISEASE     Endocrine disorder     Gout     History of blood transfusion     Hypertension     Lymphedema of upper extremity following lymphadenectomy 2/21/2014    Migraines     Osteopenia     Other and unspecified hyperlipidemia     Unspecified essential hypertension     Visual impairment               Past Surgical History:   Procedure Laterality Date    ANGIOGRAM      APPENDECTOMY      APPENDECTOMY      CABG  2004    HYSTERECTOMY  1997    MIKE/BSO- fibroid uterus    LUMPECTOMY LEFT  Jan. 2013    invasive ductal    COLBY LOCALIZATION WIRE 1 SITE LEFT (CPT=19281)  1998;1996    NEEDLE BIOPSY LEFT  2012    us core bx-papilloma    NEEDLE BIOPSY RIGHT  2/2013    mri bx-sclerosing adenosis    OOPHORECTOMY  1997    OTHER  2015    thoracic aortic aneurysm repair    OTHER SURGICAL HISTORY  2004    Surgery for aortic dissection- aortic root repair, aortic valve replacement, single CABG    OTHER SURGICAL HISTORY       left breast biopsy in 1996 and 1998    OTHER SURGICAL HISTORY  3/1/2013    re-excision left lumpectomy    RADIATION LEFT  5/2013                Social History     Socioeconomic History    Marital status:    Tobacco Use    Smoking  status: Former     Types: Cigarettes     Quit date: 1988     Years since quittin.0    Smokeless tobacco: Never   Vaping Use    Vaping Use: Never used   Substance and Sexual Activity    Alcohol use: Yes     Alcohol/week: 4.0 standard drinks of alcohol     Types: 4 Glasses of wine per week     Comment: occasionally 4 glasses red wine weekly    Drug use: No   Other Topics Concern    Seat Belt Yes     Social Determinants of Health     Food Insecurity: No Food Insecurity (2024)    Food Insecurity     Food Insecurity: Never true   Transportation Needs: No Transportation Needs (2024)    Transportation Needs     Lack of Transportation: No   Housing Stability: Low Risk  (2024)    Housing Stability     Housing Instability: No              Review of Systems    Positive for stated complaint: dc, dry cough, 96% ra  Other systems are as noted in HPI.  Constitutional and vital signs reviewed.      All other systems reviewed and negative except as noted above.    Physical Exam     ED Triage Vitals [24 0650]   BP (!) 130/100   Pulse 113   Resp 18   Temp 97.4 °F (36.3 °C)   Temp src Temporal   SpO2 96 %   O2 Device None (Room air)       Current:/79 (BP Location: Left arm)   Pulse 110   Temp 98.1 °F (36.7 °C) (Oral)   Resp 20   Wt 56.7 kg   SpO2 93%   BMI 22.43 kg/m²         Physical Exam  Vitals and nursing note reviewed.   Constitutional:       Appearance: She is well-developed.   HENT:      Head: Normocephalic and atraumatic.   Eyes:      Extraocular Movements: Extraocular movements intact.      Pupils: Pupils are equal, round, and reactive to light.   Cardiovascular:      Rate and Rhythm: Tachycardia present. Rhythm irregular.   Pulmonary:      Effort: Pulmonary effort is normal.      Breath sounds: Normal breath sounds.   Chest:      Chest wall: No mass or tenderness.   Neurological:      Mental Status: She is alert.               ED Course     Labs Reviewed   COMP METABOLIC PANEL (14)  - Abnormal; Notable for the following components:       Result Value    Glucose 137 (*)     BUN 25 (*)     Creatinine 1.15 (*)     Calculated Osmolality 303 (*)     eGFR-Cr 49 (*)     AST 80 (*)     All other components within normal limits   TROPONIN I HIGH SENSITIVITY - Abnormal; Notable for the following components:    Troponin I (High Sensitivity) 68 (*)     All other components within normal limits   PROTHROMBIN TIME (PT) - Abnormal; Notable for the following components:    PT 29.9 (*)     INR 2.80 (*)     All other components within normal limits   PRO BETA NATRIURETIC PEPTIDE - Abnormal; Notable for the following components:    Pro-Beta Natriuretic Peptide 8,949 (*)     All other components within normal limits   LIPID PANEL - Abnormal; Notable for the following components:    HDL Cholesterol 83 (*)     All other components within normal limits   TSH W REFLEX TO FREE T4 - Abnormal; Notable for the following components:    TSH 4.040 (*)     All other components within normal limits   CBC W/ DIFFERENTIAL - Abnormal; Notable for the following components:    .0 (*)     All other components within normal limits   T4, FREE (S) - Normal   SARS-COV-2/FLU A AND B/RSV BY PCR (QuibbPERT) - Normal    Narrative:     This test is intended for the qualitative detection and differentiation of SARS-CoV-2, influenza A, influenza B, and respiratory syncytial virus (RSV) viral RNA in nasopharyngeal or nares swabs from individuals suspected of respiratory viral infection consistent with COVID-19 by their healthcare provider. Signs and symptoms of respiratory viral infection due to SARS-CoV-2, influenza, and RSV can be similar.    Test performed using the Xpert Xpress SARS-CoV-2/FLU/RSV (real time RT-PCR)  assay on the [employer] instrument, [employer], [place] [address].   This test is being used under the Food and Drug Administration's Emergency Use Authorization.    The authorized Fact Sheet for Healthcare Providers for  this assay is available upon request from the laboratory.   CBC WITH DIFFERENTIAL WITH PLATELET    Narrative:     The following orders were created for panel order CBC With Differential With Platelet.  Procedure                               Abnormality         Status                     ---------                               -----------         ------                     CBC W/ DIFFERENTIAL[[id number]]          Abnormal            Final result                 Please view results for these tests on the individual orders.   RAINBOW DRAW LAVENDER   RAINBOW DRAW LIGHT GREEN   RAINBOW DRAW BLUE   RAINBOW DRAW GOLD     EKG    Rate, intervals and axes as noted on EKG Report.  Rate: 120  Rhythm: Sinus Rhythm  Reading: atrial fibrillation with rbbb    EKG    Rate, intervals and axes as noted on EKG Report.  Rate: 106  Rhythm: Atrial Fibrillation  Reading: Unchanged from prior             XR CHEST AP PORTABLE  (CPT=71045)    Result Date: 1/21/2024  CONCLUSION:  No consolidation.  Stable enlarged cardiac silhouette.  Pulmonary vasculature is unremarkable.   LOCATION:  [facility]      Dictated by (CST): Kulwinder aVlencia MD on 1/21/2024 at 7:24 AM     Finalized by (CST): Kulwinder Valencia MD on 1/21/2024 at 7:25 AM           MDM      The differential includes the following  acute coronary syndrome, acute MI which is a life threat, symptomatic atrial fibrillation, lower suspicion for PE, viral respiratory infection or pneumonia    Pertinent comorbidities include  As listed above    Pertinent social history includes  As listed above    ER course  On arrival to the ER patient slightly tachycardic between 101 teens, saturating between 92 to 95% on room air but otherwise normotensive.  During my initial evaluation patient had no chest pain or palpitations.  However she had for my nurse that she was feeling some tightness in her chest therefore she was given sublingual nitroglycerin.  Patient's troponin elevated 68.  Her INR is  2.8.    EKG now shows A-fib with a right bundle branch block irregular rhythm is the only change from prior.    Discussed with [facility] who agrees with the admission and believe she is likely symptomatic from her A-fib.  Patient was given a small dose of IV metoprolol. pro BNP pending upon time for admission.      Imaging studies  I personally reviewed the following radiology study cxr and my independent interpretation is it  showed no pneumonia  External data reviewed    Discussion of management with external providers    Admission disposition: 1/21/2024  7:43 AM       VENITA and jami hospitalist                                 Medical Decision Making      Disposition and Plan     Clinical Impression:  1. Chest pain, rule out acute myocardial infarction    2. Atrial fibrillation with rapid ventricular response (HCC)         Disposition:  Admit  1/21/2024  7:43 am    Follow-up:  No follow-up provider specified.        Medications Prescribed:  Current Discharge Medication List                            Hospital Problems       Present on Admission  Date Reviewed: 2/15/2023            ICD-10-CM Noted POA    * (Principal) Chest pain, rule out acute myocardial infarction R07.9 1/21/2024 Unknown    Atrial fibrillation with rapid ventricular response (HCC) I48.91 1/21/2024 Unknown

## 2024-01-21 NOTE — HISTORICAL OFFICE NOTE
[facility]  [address] [place] [address], 4th floor, [place] [address]  [phone]      Angeles Anand  Progress Note  Demographics:  Name: Angeles Anand YOB: 1945  Age: 78, Female Medical Record No: [id number]  Visited Date/Time: 09/13/2023 10:15 AM    Chief Complaints  6 month f/u   echo 8/30/23  History of Present Illness  Follow-up visit.  Really doing well.  Will be 20 years this March since her surgery.    Echocardiography reveals moderate LV dysfunction with normal functioning of her surgical aortic valve.    Lungs are clear.  She has a soft systolic murmur.  No significant aortic insufficiency.  TSH within normal limits.  Follow-up in November.  Last LDL 64 HDL 81  Cardiac risk factors Hypertension, Dyslipidemia and Former smoker  Past Medical History  1.Atrial fibrillation (Afib), paroxysmal - A Fib  2.Atrial Flutter, unspecified  3.CAD (coronary artery disease)  4.Aneurysm, thoracic aortic  5.Hx of CABG  6.Essential hypertension  7.Pure hypercholesterolemia  Family History  No significant family history noted.  Social History  Smoking status Former smoker  Tobacco usage - No (Ex-smoker (finding))  Alcohol usage - Yes (4 glasses of wine a week )  Review of systems  Constitutional No history of Fevers  Eyes No history of Double vision  ENT No history of Bloody nose (epistaxis)  Gastrointestinal No history of Abdominal pain  Cardiovascular No history of Chest pain  Genitourinary No history of Hematuria  Musculoskeletal No history of Gout  Respiratory No history of Hemoptysis  Neurological No history of Ataxia  Endocrine No history of Orthostatic symptoms  Psychiatric No history of Drug abuse  Hem/Lymphatic No history of Blood clots  Allergy Immunology No history of Hives  Integumentary No history of Skin changes  Physical Examination  Vitals Right Arm Sitting  / 72 mmHg, Pulse rate 53 bpm, Height in 5' 4\", BMI: 21.3, Weight in 124 lbs (or) 56.25 kgs and BSA : 1.59  cc/m²  General Appearance No Acute Distress  Head/Eyes/Ears/Nose/Mouth/Throat No icterus  Neck Normal carotid pulsations  Respiratory Lungs clear with normal breath sounds  Cardiovascular Regular rhythm.    Gastrointestinal Abdomen soft  Lower Extremities No edema  Skin Warm and dry  Neurologic / Psychiatric Non-focal  Allergies  1.Kiwi(Reaction:, Severity:Mild)  2.Kiwi (Food Or Med)(Reaction:, Severity:Mild)  Medications (Info obtained by: Verbal)  1.amiodarone 200 mg tablet, Take one-half tablet orally once a day.  2.amoxicillin (AMOXIL) 500 MG tablet, 500 mg. For dental procedures  3.anastrozole (ARIMIDEX) 1 MG tablet, TAKE ONE TABLET BY MOUTH DAILY  4.betamethasone valerate (VALISONE) 0.1 % cream, Apply to AA's of legs BID x 1-2 weeks, hold at least one week, then PRN for flares.  5.Calcium 600-200 MG-UNIT Tab, Take 600 mg by mouth.  6.cyanocobalamin (VITAMIN B-12) 500 MCG tablet, Take 500 mcg by mouth.  7.Metoprolol Tartrate 25 Mg Tab Auro, TAKE 1 TABLET BY MOUTH TWICE DAILY  8.mometasone (ELOCON) 0.1 % cream, Apply to AAs of face BID x1 week. Hold x1 week. Repeat PRN with flares.  9.Ramipril 5 Mg Cap Zydu, TAKE ONE CAPSULE BY MOUTH DAILY  10.Simvastatin 40 Mg Tab Nort, TAKE 1 TABLET BY MOUTH EVERY NIGHT AT BEDTIME  11.triamcinolone 0.025 % lotion, Apply topically as needed.  12.warfarin 2.5 mg tablet, Take 0.5-1 tablet orally once in the evening as directed by the warfarin clinic   13.warfarin 5 mg tablet, Take 1 tablet (5 mg) orally once in the evening on Wednesdays. Take 0.5 tablet (2.5 mg) orally once in the evening on the remaining days of the week, or as directed by the [facility] warfarin clinic  Impression  1.Monitoring for long-term anticoagulant use  2.Atrial fibrillation (Afib), paroxysmal - A Fib  3.CAD (coronary artery disease)  4.Aneurysm, thoracic aortic  5.Hx of CABG  6.Essential hypertension  7.Pure hypercholesterolemia  Assessment & Plan  Compensated cardiac status.  Prior coronary bypass  surgery with aortic valve replacement, PFO closure and repair of a large ascending thoracic aneurysm.  Extremely symptomatic paroxysmal atrial fibrillation that has been doing very well on amiodarone.    Overall doing well.  At this juncture we will stay on her current regimen.  I will see him in 6 months.    She is currently up-to-date on her cardiac testing.  Future appointments  1.Referral Visit - Charlene Huang ([email or web address]) : (Today)  2.Follow up visit - Ritesh Guerar (6 Months)  Nurses documentation  Refills: no  Upcoming surgeries: no   Use of assistant devices: no  EKG: no  Medication list and triage completed by: EC   Patient instructions  Your provider has requested for you to follow up in 6 months. We will call you to schedule the appointment. It is always important to bring all your medications including over the counter medications, vitamins and supplements to your doctor visits. This will assist in providing the best care for your condition. Please bring your insurance cards to your appointment.   Lab Details  POCT FLAVIACK  09/12/2023 09:41:40 AM  POC INR 2.40 0.90-1.10 H F  INR  09/12/2023 12:00:00 AM  INR 2.4 2-3 RATIO N F  PROTHROMBIN TIME (PT)  11/30/2022 10:16:21 AM  PT 35.6 11.6-14.8 seconds H F  INR 3.64 0.85-1.16 H F  Diagnostics Details  Trans Thoracic Echocardiogram 08/30/2023  1.The left ventricle is mildly enlarged. Wall thickness is normal. Global left ventricular systolic function is moderately decreased. The left ventricular ejection fraction is 35-40%. Lumason was used to enhance visualization of endocardial borders.    2.A SAVR has been performed and the valve is functioning normally. The trans-aortic peak velocity is 1.8 m/s. The trans-aortic mean gradient is 7.0 mmHg. Trace AI.    Care Providers: Ritesh Guerra MD, Yudy John Coss and Karey  Electronically Authenticated by  Ritesh Guerra MD  09/13/2023 04:52:44 PM  Disclaimer:  Components of this note were documented using voice recognition system and are subject to errors not corrected at proofreading. Contact the author of this note for any clarifications.

## 2024-01-21 NOTE — IMAGING NOTE
[facility] TTE 8/30/2023       1. The left ventricle is mildly enlarged. Wall thickness is normal. Global left ventricular systolic function is moderately decreased. The left ventricular ejection fraction is 35-40%.  Lumason was used to enhance visualization of endocardial borders.   2. A SAVR has been performed and the valve is functioning normally. The trans-aortic peak velocity is 1.8 m/s. The trans-aortic mean gradient is 7.0 mmHg. Trace AI.

## 2024-01-21 NOTE — H&P
[facility] Hospitalist H&P       CC:   Chief Complaint   Patient presents with    Difficulty Breathing        PCP: Charlene Huang MD    History of Present Illness:    Patient is a 78-year-old female signal past medical history of paroxysmal A-fib, a flutter, CAD, history of CABG, hypertension, hyperlipidemia, SAVR, chronic thrombocytopenia, leukopenia chronic combined systolic as well as diastolic heart failure hypothyroidism presented with a further RVR palpitations as well as shortness of breath.  Patient stated that at 10 PM she woke up secondary to shortness of breath, reports some wheezing, as well as palpitations.  Patient stated that she was unable to sleep all night because of the palpitation and wheezing which is why she presented to the emergency room.  She had no chest pain no tightness no lightheadedness or dizziness no nausea no vomiting no diarrhea no fevers or chills, did have a cough otherwise review of system was completed negative.  Patient has not had A-fib with RVR ever since being on amiodarone    in the emergency room she was found to be in A-fib with RVR with heart rates between 110s to 120s,    She was afebrile, saturating well on room air, blood pressure was stable at 114/90  Now labs were remarkable for an EKG that showed A-fib with RVR with PVCs, chest x-ray was done that was negative for any fluid or pneumonia creatinine was at 1.15 close to baseline  Troponin was slightly elevated at 68, proBNP was significantly elevated at 8949 and INR was at 2.8, CBC was unremarkable    She had an echo in August 2023 that showed an EF of 35 to 40%    Patient was admitted and cardiology was consulted  [facility]  Past Medical History:   Diagnosis Date    Arrhythmia     ATRIAL FIBRILLATION     Dr. Guerra    Atrial flutter (HCC)     Breast cancer (HCC) 2013    INVASIVE DUCTAL    CANCER 10/96    breast CIS- ?ductal     Cancer (HCC)     CORONARY ARTERY DISEASE     Endocrine disorder     Gout     History of blood  transfusion     Hypertension     Lymphedema of upper extremity following lymphadenectomy 2/21/2014    Migraines     Osteopenia     Other and unspecified hyperlipidemia     Unspecified essential hypertension     Visual impairment         PSH  Past Surgical History:   Procedure Laterality Date    ANGIOGRAM      APPENDECTOMY      APPENDECTOMY      CABG  2004    HYSTERECTOMY  1997    MIKE/BSO- fibroid uterus    LUMPECTOMY LEFT  Jan. 2013    invasive ductal    COLBY LOCALIZATION WIRE 1 SITE LEFT (CPT=19281)  1998;1996    NEEDLE BIOPSY LEFT  2012    us core bx-papilloma    NEEDLE BIOPSY RIGHT  2/2013    mri bx-sclerosing adenosis    OOPHORECTOMY  1997    OTHER  2015    thoracic aortic aneurysm repair    OTHER SURGICAL HISTORY  2004    Surgery for aortic dissection- aortic root repair, aortic valve replacement, single CABG    OTHER SURGICAL HISTORY       left breast biopsy in 1996 and 1998    OTHER SURGICAL HISTORY  3/1/2013    re-excision left lumpectomy    RADIATION LEFT  5/2013        ALL:  Allergies   Allergen Reactions    Gadolinium     Radiology Contrast Iodinated Dyes     Kiwi Extract SWELLING        Home Medications:  Outpatient Medications Marked as Taking for the 1/21/24 encounter (Hospital Encounter)   Medication Sig Dispense Refill    warfarin 2.5 MG Oral Tab Take 1/2 to1 tablet by mouth once in the evening as directed by the warfarin clinic      anastrozole 1 MG Oral Tab tab Take 1 tablet (1 mg total) by mouth daily. 90 tablet 3    amiodarone 200 MG Oral Tab Take 0.5 tablets (100 mg total) by mouth daily.      Warfarin Sodium 5 MG Oral Tab Take 1 tablet (5 mg total) by mouth nightly. Coumadin clinic, 5mg Wed 2.5 mg all other days      Calcium-Vitamin D (CALTRATE 600 PLUS-VIT D OR) Take 600 mg by mouth 2 (two) times daily.        ramipril (ALTACE) 5 MG Oral Cap Take 1 capsule (5 mg total) by mouth daily. 30 capsule 11    simvastatin 40 MG Oral Tab Take 1 tablet (40 mg total) by mouth nightly.      Metoprolol  Tartrate (LOPRESSOR) 25 MG Oral Tab Take 1 tablet (25 mg total) by mouth 2 (two) times daily. 60 tablet 11         Soc Hx  Social History     Tobacco Use    Smoking status: Former     Types: Cigarettes     Quit date: 1988     Years since quittin.0    Smokeless tobacco: Never   Substance Use Topics    Alcohol use: Yes     Alcohol/week: 4.0 standard drinks of alcohol     Types: 4 Glasses of wine per week     Comment: occasionally 4 glasses red wine weekly        Fam Hx  Family History   Problem Relation Age of Onset    Heart Disorder Father     Diabetes Mother     Breast Cancer Self 68       Review of Systems  General: Denies unintentional weight loss, fevers, or chills-negative except for above  HEENT: Denies vision loss or double vision, denies hearing loss  Cardiovascular: Denies chest pain, palpitations, peripheral edema  Pulmonary: Denies cough, shortness of breath, or wheezing  Gastrointestinal: Denies abdominal pain, melena, or hematochezia  Genitourinary: Denies urinary frequency, urgency, and dysuria  Neurologic: Denies numbness, headaches, focal weakness  Skin: Denies rashes, sores  Endocrine: Denies heat or cold intolerance, denies polydipsia  Hematologic: Denies abnormal bleeding or bruising     OBJECTIVE:  /79 (BP Location: Left arm)   Pulse 110   Temp 98.1 °F (36.7 °C) (Oral)   Resp 20   Wt 125 lb (56.7 kg)   SpO2 93%   BMI 22.43 kg/m²     BP Readings from Last 3 Encounters:   24 121/79   02/15/23 119/69   22 (!) 176/91     Wt Readings from Last 3 Encounters:   24 125 lb (56.7 kg)   02/15/23 125 lb 8 oz (56.9 kg)   22 130 lb (59 kg)       Wt Readings from Last 6 Encounters:   24 125 lb (56.7 kg)   02/15/23 125 lb 8 oz (56.9 kg)   22 130 lb (59 kg)   22 132 lb (59.9 kg)   21 129 lb 9.6 oz (58.8 kg)   10/14/21 141 lb (64 kg)     Gen: No acute distress, alert and oriented x 3  Pulm: Lungs clear bilaterally, good inspiratory effort no  wheezing rales or rhonchi  CV:  nL S1/S2 irregularly irregular tachycardic  Abd: soft, NT/ND, no hepatomegaly, +BS  MSK: moving all extremities, no edema  Neuro: no focal deficits  Skin: no rashes/lesions  Psych: normal mood/affect          Diagnostic Data:    CBC/Chem  Recent Labs   Lab 01/21/24  0659   WBC 4.2   HGB 13.4   MCV 90.2   .0*   INR 2.80*       Recent Labs   Lab 01/21/24  0659      K 3.8      CO2 23.0   BUN 25*   CREATSERUM 1.15*   *   CA 9.4       Recent Labs   Lab 01/21/24  0659   AST 80*   ALB 3.9       No results for input(s): \"TROP\" in the last 168 hours.      Radiology: XR CHEST AP PORTABLE  (CPT=71045)    Result Date: 1/21/2024  PROCEDURE:  XR CHEST AP PORTABLE  (CPT=71045)  TECHNIQUE:  AP chest radiograph was obtained.  COMPARISON:  [facility] , XR, XR CHEST PA + LAT CHEST (NYJ=[id number]), 4/20/2022, 10:48 AM.  INDICATIONS:  dc, dry cough, 96% ra  PATIENT STATED HISTORY: (As transcribed by Technologist)  Patient states dry cough for several days.    FINDINGS:   Enlarged cardiac silhouette.  Median sternotomy wires are stable.  No consolidation, pleural effusion or pneumothorax.            CONCLUSION:  No consolidation.  Stable enlarged cardiac silhouette.  Pulmonary vasculature is unremarkable.   LOCATION:  [facility]      Dictated by (CST): Kulwinder Valencia MD on 1/21/2024 at 7:24 AM     Finalized by (CST): Kulwinder Valencia MD on 1/21/2024 at 7:25 AM              ASSESSMENT / PLAN:       78-year-old female signal past medical history of paroxysmal A-fib, a flutter, CAD, history of CABG, hypertension, hyperlipidemia, SAVR, chronic thrombocytopenia, leukopenia chronic combined systolic as well as diastolic heart failure hypothyroidism presented with a further RVR palpitations as well as shortness of breath    # A-fib with RVR  # NSTEMI type II  -Heart rate elevated in the 120s, improved and continues to be in A-fib  -Resume beta-blocker 25 twice daily, resume amiodarone as well  as Coumadin  -Cardiology consulted, echo pending  -Serial troponin    # Shortness of breath rule out, and acute HFrEF and acute HFrEF  -Patient appears to be euvolemic however was dyspneic last night, chest x-ray was done without any fluid overload  -proBNP however was slightly elevated and had subjective shortness of breath, will give a dose of IV Lasix  -Continue to monitor echo  -Continue ACE, beta-blocker      # History of CAD and CABG with a SAVR  -TSH and T4 reviewed  -Check echo, cardiology consulted    # Hypertension-stable resume home ACE as well as beta-blocker and 1 dose of IV Lasix for now    #, Thrombo-cytopenia, appears to be chronic, platelet count of 142    Prophy:  DVT: Coumadin OT  Deconditioning prevention: PT MedSurg with telemetry    Dispo: admit to MedSurg with telemetry    Outpatient records reviewed confirming patient's medical history and medications.     Further recommendations pending patient's clinical course.  [facility] hospitalist to continue to follow patient while in house    Time spent: greater than 95 minutes spent in d/w pt/family, coordination of care, and d/w staff.   Nathan sims MD   Internal Medicine  [facility] Hospitalist  Pager: [phone]      **Certification      PHYSICIAN Certification of Need for Inpatient Hospitalization - Initial Certification    Patient will require inpatient services that will reasonably be expected to span two midnight's based on the clinical documentation in H+P.   Based on patients current state of illness, I anticipate that, after discharge, patient will require TBD.

## 2024-01-21 NOTE — ED QUICK NOTES
Orders for admission, patient is aware of plan and ready to go upstairs. Any questions, please call ED RN jojo at extension [phone].     Patient Covid vaccination status: Fully vaccinated     COVID Test Ordered in ED: SARS-CoV-2/Flu A and B/RSV by PCR (GeneXpert)    COVID Suspicion at Admission: N/A    Running Infusions:  None    Mental Status/LOC at time of transport: aox4    Other pertinent information:   CIWA score: N/A   NIH score:  N/A

## 2024-01-21 NOTE — PROGRESS NOTES
Admission ortho's negative       01/21/24 0914 01/21/24 0917 01/21/24 0919   Vital Signs   Pulse 111 105 112   Heart Rate Source Monitor Monitor Monitor   Resp 15  --   --    Respiratory Quality Normal Normal Normal   /90 120/85 (!) 121/95   MAP (mmHg) 96 94 (!) 101   BP Location Right arm Right arm Right arm   BP Method Automatic Automatic Automatic   Patient Position Lying Sitting Standing             NURSING ADMISSION NOTE      Patient admitted via Cart  Oriented to room.  Safety precautions initiated.  Bed in low position.  Call light in reach.    Admission navigator completed.  Skin CDI.

## 2024-01-21 NOTE — PLAN OF CARE
Pt A&Ox4. Glasses. Left arm restriction. Lungs clear on RA. No SOB or palpitations currently. Denies pain at this time. Afib 's on tele. Last BM 01/20/2023. Voids independently. Usually on coumadin at home. Coumadin not ordered currently. K replaced per protocol. Echo ordered. Skin CDI. Admission complete. Call light within reach. Family updated at bedside.         Problem: CARDIOVASCULAR - ADULT  Goal: Maintains optimal cardiac output and hemodynamic stability  Description: INTERVENTIONS:  - Monitor vital signs, rhythm, and trends  - Monitor for bleeding, hypotension and signs of decreased cardiac output  - Evaluate effectiveness of vasoactive medications to optimize hemodynamic stability  - Monitor arterial and/or venous puncture sites for bleeding and/or hematoma  - Assess quality of pulses, skin color and temperature  - Assess for signs of decreased coronary artery perfusion - ex. Angina  - Evaluate fluid balance, assess for edema, trend weights  Outcome: Progressing  Goal: Absence of cardiac arrhythmias or at baseline  Description: INTERVENTIONS:  - Continuous cardiac monitoring, monitor vital signs, obtain 12 lead EKG if indicated  - Evaluate effectiveness of antiarrhythmic and heart rate control medications as ordered  - Initiate emergency measures for life threatening arrhythmias  - Monitor electrolytes and administer replacement therapy as ordered  Outcome: Progressing

## 2024-01-21 NOTE — CONSULTS
[facility] Pharmacy Dosing Service  Warfarin (Coumadin) Initial Dosing    Angeles Anand is a 78 year old patient for whom pharmacy has been consulted to dose warfarin (COUMADIN) for atrial fibrillation by Dr. Guardado.  Based on this indication, goal INR is 2-3.    Pertinent Patient Medical History:  Afib, HF  Potential Drug Interactions:  amiodarone    Latest INR:   Recent Labs     01/21/24  0659   INR 2.80*       Other Anticoagulants:  none  Home regimen (if applicable):  2.5 mg qhs   Date/Time last dose was given (if applicable): pta    Based on above -  1.  For today, Give warfarin (COUMADIN) 2 mg at 2100 tonight    2.  PT/INR ordered daily while on warfarin    3.  Pharmacy will continue to follow.  We appreciate the opportunity to assist in the care of this patient.    Esperanza Calloway, PharmD  1/21/2024  4:05 PM

## 2024-01-21 NOTE — ED QUICK NOTES
Pt reevaluated by er physician. Informed of her test reports and plan of care. Verbalizing understanding. Pt's  at bedside

## 2024-01-21 NOTE — CONSULTS
[facility]  Cardiology Consultation    Angeles Anand Patient Status:  Inpatient    3/6/1945 MRN [id number]   Location [facility] 0SW-A Attending Nathan Guardado MD   Hosp Day # 0 PCP Charlene Huang MD     Reason for Consultation:  Dyspnea and palpitations    History of Present Illness:  Angeles Anand is a a(n) 78 year old female who presents with dyspnea and palpitations since last night.  She has followed in the past with my colleague Dr. Guerra and recent office note has been copied into current EMR chart for reference.   She denies chest pain, orthopnea, PND or LE swelling.  She denies presyncope or syncope.  She denies fever, chills, nausea/vomiting, diarrhea or urinary complaints.    History:  Past Medical History:   Diagnosis Date    Arrhythmia     ATRIAL FIBRILLATION     Dr. Guerra    Atrial flutter (HCC)     Breast cancer (HCC) 2013    INVASIVE DUCTAL    CANCER 10/96    breast CIS- ?ductal     Cancer (HCC)     CORONARY ARTERY DISEASE     Endocrine disorder     Gout     History of blood transfusion     Hypertension     Lymphedema of upper extremity following lymphadenectomy 2014    Migraines     Osteopenia     Other and unspecified hyperlipidemia     Unspecified essential hypertension     Visual impairment      Past Surgical History:   Procedure Laterality Date    ANGIOGRAM      APPENDECTOMY      APPENDECTOMY      CABG  2004    HYSTERECTOMY      MIKE/BSO- fibroid uterus    LUMPECTOMY LEFT  2013    invasive ductal    COLBY LOCALIZATION WIRE 1 SITE LEFT (CPT=19281)  1998;1996    NEEDLE BIOPSY LEFT      us core bx-papilloma    NEEDLE BIOPSY RIGHT  2013    mri bx-sclerosing adenosis    OOPHORECTOMY  1997    OTHER  2015    thoracic aortic aneurysm repair    OTHER SURGICAL HISTORY  2004    Surgery for aortic dissection- aortic root repair, aortic valve replacement, single CABG    OTHER SURGICAL HISTORY       left breast biopsy in  and     OTHER SURGICAL  Retinal detachment warnings given. HISTORY  3/1/2013    re-excision left lumpectomy    RADIATION LEFT  5/2013     Family History   Problem Relation Age of Onset    Heart Disorder Father     Diabetes Mother     Breast Cancer Self 67      reports that she quit smoking about 36 years ago. Her smoking use included cigarettes. She has never used smokeless tobacco. She reports current alcohol use of about 4.0 standard drinks of alcohol per week. She reports that she does not use drugs.    Allergies:  Allergies   Allergen Reactions    Gadolinium     Radiology Contrast Iodinated Dyes     Kiwi Extract SWELLING       Medications:    Current Facility-Administered Medications:     nitroglycerin (Nitrostat) SL tab 0.4 mg, 0.4 mg, Sublingual, Q5 Min PRN    acetaminophen (Tylenol Extra Strength) tab 500 mg, 500 mg, Oral, Q4H PRN    melatonin tab 3 mg, 3 mg, Oral, Nightly PRN    polyethylene glycol (PEG 3350) (Miralax) 17 g oral packet 17 g, 17 g, Oral, Daily PRN    sennosides (Senokot) tab 17.2 mg, 17.2 mg, Oral, Nightly PRN    bisacodyl (Dulcolax) 10 MG rectal suppository 10 mg, 10 mg, Rectal, Daily PRN    ondansetron (Zofran) 4 MG/2ML injection 4 mg, 4 mg, Intravenous, Q6H PRN    metoclopramide (Reglan) 5 mg/mL injection 5 mg, 5 mg, Intravenous, Q8H PRN    guaiFENesin ER (Mucinex) 12 hr tab 600 mg, 600 mg, Oral, BID    amiodarone (Pacerone) tab 100 mg, 100 mg, Oral, Daily    anastrozole (Arimidex) tab 1 mg, 1 mg, Oral, Daily    metoprolol tartrate (Lopressor) tab 25 mg, 25 mg, Oral, BID    ramipril (Altace) cap 5 mg, 5 mg, Oral, Daily    atorvastatin (Lipitor) tab 20 mg, 20 mg, Oral, Nightly    warfarin (Coumadin) tab 5 mg, 5 mg, Oral, Nightly    furosemide (Lasix) 10 mg/mL injection 40 mg, 40 mg, Intravenous, Once    Review of Systems:  A comprehensive review of systems was negative if not otherwise mention in above HPI.    /79 (BP Location: Left arm)   Pulse 110   Temp 98.1 °F (36.7 °C) (Oral)   Resp 20   Wt 125 lb (56.7 kg)   SpO2 93%   BMI 22.43  kg/m²   Temp (24hrs), Av.8 °F (36.6 °C), Min:97.4 °F (36.3 °C), Max:98.1 °F (36.7 °C)     No intake or output data in the 24 hours ending 24 1546  Wt Readings from Last 3 Encounters:   24 125 lb (56.7 kg)   02/15/23 125 lb 8 oz (56.9 kg)   22 130 lb (59 kg)       Physical Exam:   General: Alert and oriented x 3. No apparent distress. No respiratory or constitutional distress.  HEENT: Normocephalic, anicteric sclera, neck supple.  Neck: No JVD, carotids 2+, no bruits.  Cardiac: Irregular rate and rhythm. S1, S2 normal. No murmur, pericardial rub, S3.  Lungs: Clear without wheezes, rales, rhonchi or dullness.  Normal excursions and effort.  Abdomen: Soft, non-tender.  Extremities: Without clubbing, cyanosis or edema.  Peripheral pulses are 2+.  Neurologic: Alert and oriented, normal affect.  Skin: Warm and dry.     Laboratory Data:  Lab Results   Component Value Date    WBC 4.2 2024    HGB 13.4 2024    HCT 42.2 2024    .0 2024    CREATSERUM 1.15 2024    BUN 25 2024     2024    K 3.8 2024     2024    CO2 23.0 2024     2024    CA 9.4 2024    ALB 3.9 2024    ALKPHO 102 2024    BILT 0.7 2024    TP 7.5 2024    AST 80 2024    ALT  2024      Comment:      Due to  backorder we are temporarily unable to offer hospital-based ALT testing at [facility].   If urgently needed, please order ALT test code [id number].   The new order will need a new venipuncture and will be sent to [facility] for testing.   The expected turnaround time will be within 24 hours.     INR 2.80 2024    PTP 29.9 2024    T4F 1.6 2024    TSH 4.040 2024     Recent Labs   Lab 24  0659   TROPHS 68*      Latest Reference Range & Units 12/07/15 23:35 12/08/15 07:22 12/08/15 11:59 12/08/15 17:42   TROPONIN <0.046 ng/mL 0.251 () 2.230 () 1.480 () 1.060 ()   ():  Data is critically high     Latest Reference Range & Units 01/21/24 06:59   Cholesterol, Total <200 mg/dL 145   Triglycerides 30 - 149 mg/dL 88   HDL Cholesterol 40 - 59 mg/dL 83 (H)   VLDL 0 - 30 mg/dL 12   NON-HDL CHOLESTEROL <130 mg/dL 62   LDL Cholesterol Calc <100 mg/dL 46   (H): Data is abnormally high    Imaging:  EKG 1/21/2024: afib w , RBBB with no acute ST/T wave changes    Cardiac angiogram Dec 2015 SUMMARY:  In summary, the patient is a 70-year-old woman who on cardiac catheterization today was demonstrated to have mild angiographically nonobstructive coronary atherosclerosis.  She has widely patent mammary artery to the distal left anterior descending coronary artery.     [facility] TTE 8/30/2023  1.          The left ventricle is mildly enlarged. Wall thickness is normal. Global left ventricular systolic function is moderately decreased. The left ventricular ejection fraction is 35-40%.  Lumason was used to enhance visualization of endocardial borders.   2.          A SAVR has been performed and the valve is functioning normally. The trans-aortic peak velocity is 1.8 m/s. The trans-aortic mean gradient is 7.0 mmHg. Trace AI.    Impression:  Atrial fibrillation with rapid ventricular response  Elevated troponin  Prior coronary bypass surgery with aortic valve replacement, PFO closure and repair of a large ascending thoracic aneurysm  Hx Breast cancer  HTN  HL     Recommendations:  -tele monitoring  -2D echocardiogram tomorrow  -continue oral amiodarone 100 mg daily  -continue lopressor 25 mg daily and IV lopressor available for sustained afib w RVR greater than 130  -continue ACE-I and statin  -continue coumadin  -EP to evaluate tomorrow    D/w patient,  and bedside RN    Thank you for allowing me to participate in the care of your patient.    Sam Kent MD  1/21/2024  3:46 PM

## 2024-01-22 ENCOUNTER — APPOINTMENT (OUTPATIENT)
Dept: CV DIAGNOSTICS | Facility: HOSPITAL | Age: 79
End: 2024-01-22
Attending: HOSPITALIST
Payer: MEDICARE

## 2024-01-22 LAB
ANION GAP SERPL CALC-SCNC: 6 MMOL/L (ref 0–18)
BASOPHILS # BLD AUTO: 0.01 X10(3) UL (ref 0–0.2)
BASOPHILS NFR BLD AUTO: 0.2 %
BUN BLD-MCNC: 25 MG/DL (ref 9–23)
CALCIUM BLD-MCNC: 8.7 MG/DL (ref 8.5–10.1)
CHLORIDE SERPL-SCNC: 111 MMOL/L (ref 98–112)
CO2 SERPL-SCNC: 25 MMOL/L (ref 21–32)
CREAT BLD-MCNC: 1.05 MG/DL
EGFRCR SERPLBLD CKD-EPI 2021: 55 ML/MIN/1.73M2 (ref 60–?)
EOSINOPHIL # BLD AUTO: 0.02 X10(3) UL (ref 0–0.7)
EOSINOPHIL NFR BLD AUTO: 0.4 %
ERYTHROCYTE [DISTWIDTH] IN BLOOD BY AUTOMATED COUNT: 13.3 %
GLUCOSE BLD-MCNC: 132 MG/DL (ref 70–99)
HCT VFR BLD AUTO: 38.7 %
HGB BLD-MCNC: 12.5 G/DL
IMM GRANULOCYTES # BLD AUTO: 0.02 X10(3) UL (ref 0–1)
IMM GRANULOCYTES NFR BLD: 0.4 %
INR BLD: 2.95 (ref 0.8–1.2)
LYMPHOCYTES # BLD AUTO: 1.58 X10(3) UL (ref 1–4)
LYMPHOCYTES NFR BLD AUTO: 29.9 %
MCH RBC QN AUTO: 28.5 PG (ref 26–34)
MCHC RBC AUTO-ENTMCNC: 32.3 G/DL (ref 31–37)
MCV RBC AUTO: 88.2 FL
MONOCYTES # BLD AUTO: 1.62 X10(3) UL (ref 0.1–1)
MONOCYTES NFR BLD AUTO: 30.7 %
NEUTROPHILS # BLD AUTO: 2.03 X10 (3) UL (ref 1.5–7.7)
NEUTROPHILS # BLD AUTO: 2.03 X10(3) UL (ref 1.5–7.7)
NEUTROPHILS NFR BLD AUTO: 38.4 %
OSMOLALITY SERPL CALC.SUM OF ELEC: 300 MOSM/KG (ref 275–295)
PLATELET # BLD AUTO: 123 10(3)UL (ref 150–450)
POTASSIUM SERPL-SCNC: 3.4 MMOL/L (ref 3.5–5.1)
POTASSIUM SERPL-SCNC: 3.4 MMOL/L (ref 3.5–5.1)
PROTHROMBIN TIME: 31.2 SECONDS (ref 11.6–14.8)
Q-T INTERVAL: 400 MS
QRS DURATION: 174 MS
QTC CALCULATION (BEZET): 565 MS
R AXIS: 150 DEGREES
RBC # BLD AUTO: 4.39 X10(6)UL
SODIUM SERPL-SCNC: 142 MMOL/L (ref 136–145)
T AXIS: -18 DEGREES
TROPONIN I SERPL HS-MCNC: 24 NG/L
TROPONIN I SERPL HS-MCNC: 27 NG/L
TROPONIN I SERPL HS-MCNC: 28 NG/L
TROPONIN I SERPL HS-MCNC: 36 NG/L
VENTRICULAR RATE: 120 BPM
WBC # BLD AUTO: 5.3 X10(3) UL (ref 4–11)

## 2024-01-22 PROCEDURE — 93306 TTE W/DOPPLER COMPLETE: CPT | Performed by: HOSPITALIST

## 2024-01-22 PROCEDURE — 84132 ASSAY OF SERUM POTASSIUM: CPT | Performed by: INTERNAL MEDICINE

## 2024-01-22 PROCEDURE — 80048 BASIC METABOLIC PNL TOTAL CA: CPT | Performed by: HOSPITALIST

## 2024-01-22 PROCEDURE — 85610 PROTHROMBIN TIME: CPT | Performed by: HOSPITALIST

## 2024-01-22 PROCEDURE — 85025 COMPLETE CBC W/AUTO DIFF WBC: CPT | Performed by: HOSPITALIST

## 2024-01-22 PROCEDURE — 84484 ASSAY OF TROPONIN QUANT: CPT | Performed by: HOSPITALIST

## 2024-01-22 PROCEDURE — 97116 GAIT TRAINING THERAPY: CPT

## 2024-01-22 PROCEDURE — 97161 PT EVAL LOW COMPLEX 20 MIN: CPT

## 2024-01-22 RX ORDER — WARFARIN SODIUM 2 MG/1
2 TABLET ORAL
Status: DISCONTINUED | OUTPATIENT
Start: 2024-01-22 | End: 2024-01-22

## 2024-01-22 RX ORDER — DIGOXIN 0.25 MG/ML
250 INJECTION INTRAMUSCULAR; INTRAVENOUS ONCE
Status: COMPLETED | OUTPATIENT
Start: 2024-01-22 | End: 2024-01-22

## 2024-01-22 RX ORDER — WARFARIN SODIUM 1 MG/1
1 TABLET ORAL
Status: COMPLETED | OUTPATIENT
Start: 2024-01-22 | End: 2024-01-22

## 2024-01-22 RX ORDER — METOPROLOL SUCCINATE 50 MG/1
50 TABLET, EXTENDED RELEASE ORAL
Status: DISCONTINUED | OUTPATIENT
Start: 2024-01-22 | End: 2024-01-22

## 2024-01-22 RX ORDER — AMIODARONE HYDROCHLORIDE 200 MG/1
200 TABLET ORAL 3 TIMES DAILY
Status: DISCONTINUED | OUTPATIENT
Start: 2024-01-22 | End: 2024-01-23

## 2024-01-22 NOTE — PHYSICAL THERAPY NOTE
PHYSICAL THERAPY EVALUATION - INPATIENT     Room Number: 0019/0019-A  Evaluation Date: 1/22/2024  Type of Evaluation: Initial  Physician Order: PT Eval and Treat    Presenting Problem: chest pain, SOB and palpitations  Co-Morbidities : Afib, CAD, HTN, migraines, systolic and diastolic heart failure, osteopenia, CABG, breast cancer - LUE precautions, RVR palpitations  Reason for Therapy: Mobility Dysfunction and Discharge Planning      ASSESSMENT   Pt is a 78 year old female admitted on 1/21/2024 for chest pain, SOB, and palpitations. Functional outcome measures completed include [facility].  The AM-PAC '6-Clicks' Inpatient Basic Mobility Short Form was completed and this patient is demonstrating a Approx Degree of Impairment: 11.2%  degree of impairment in mobility. Research supports that patients with this level of impairment may benefit from home.  PT Discharge Recommendations: Home      PLAN  Patient has been evaluated and presents with no skilled Physical Therapy needs at this time.  Patient discharged from Physical Therapy services.  Please re-order if a new functional limitation presents during this admission.    GOALS  Patient was able to achieve the following goals ...    Patient was able to transfer Safely with supervision   Patient able to ambulate on level surfaces Safely with supervision          HOME SITUATION  Type of Home: Apartment   Home Layout: One level  Stairs to Enter : 15  Railing: Yes          Lives With: Spouse  Drives: No  Patient Owned Equipment: Rolling walker;Cane  Patient Regularly Uses: Glasses    Prior Level of [place]: Pt reports ability to perform functional activities independently at home without use of assistive device. Pt's spouse present to confirm this.     SUBJECTIVE  Pt pleasant and cooperative during session.       OBJECTIVE  Precautions: Bed/chair alarm  Fall Risk: Standard fall risk    WEIGHT BEARING RESTRICTION  Weight Bearing Restriction: None                PAIN  ASSESSMENT  Ratin     Management Techniques: Activity promotion;Body mechanics;Relaxation;Repositioning    COGNITION  Overall Cognitive Status:  WFL - within functional limits    RANGE OF MOTION AND STRENGTH ASSESSMENT  Upper extremity ROM and strength are within functional limits     Lower extremity ROM is within functional limits     Lower extremity strength is within functional limits       BALANCE  Static Sitting: Good  Dynamic Sitting: Good  Static Standing: Fair +  Dynamic Standing: Fair +    ADDITIONAL TESTS                                    ACTIVITY TOLERANCE   Pt denies dizziness during activities of this session. HR stable; highest around 111 bpm during ambulation. RN notified.                       O2 WALK       NEUROLOGICAL FINDINGS                        AM-PAC '6-Clicks' INPATIENT SHORT FORM - BASIC MOBILITY  How much difficulty does the patient currently have...  Patient Difficulty: Turning over in bed (including adjusting bedclothes, sheets and blankets)?: None   Patient Difficulty: Sitting down on and standing up from a chair with arms (e.g., wheelchair, bedside commode, etc.): None   Patient Difficulty: Moving from lying on back to sitting on the side of the bed?: None   How much help from another person does the patient currently need...   Help from Another: Moving to and from a bed to a chair (including a wheelchair)?: None   Help from Another: Need to walk in hospital room?: None   Help from Another: Climbing 3-5 steps with a railing?: A Little       AM-PAC Score:  Raw Score: 23   Approx Degree of Impairment: 11.2%   Standardized Score (AM-PAC Scale): 56.93   CMS Modifier (G-Code): CI    FUNCTIONAL ABILITY STATUS  Gait Assessment   Functional Mobility/Gait Assessment  Gait Assistance: Independent;Supervision  Distance (ft): 150  Assistive Device: None  Pattern: Within Functional Limits  Stairs: Stairs  How Many Stairs: 4  Device: 1 Rail  Assist: Supervision  Pattern: Ascend and  Descend  Ascend and Descend : Reciprocal    Skilled Therapy Provided: Per RNgeovany for therapy. Pt received seated EOB and agreeable for PT session.     Bed Mobility:  Rolling: NT  Supine to sit: mod I   Sit to supine: mod I     Transfer Mobility:  Sit to stand: supervision progressed to mod I   Stand to sit: supervision progressed to mod I  Gait = supervision progressed to independent, pt ambulated without use of assistive device    Therapist's comments: Pt educated on role of therapy, goals of session, safety, fall prevention, and discharge planning. Pt in agreement with POC, denies concerns regarding discharge home.     Exercise/Education Provided:  Bed mobility  Body mechanics  Energy conservation  Functional activity tolerated  Gait training  ROM  Strengthening    Patient End of Session: In bed;Needs met;Call light within reach;RN aware of session/findings;All patient questions and concerns addressed;Alarm set;Family present    Patient Evaluation Complexity Level:  History Moderate - 1 or 2 personal factors and/or co-morbidities   Examination of body systems Low - addressing 1-2 elements   Clinical Presentation Low - Stable   Clinical Decision Making Low Complexity       PT Session Time: 25 minutes  Gait Training: 15 minutes

## 2024-01-22 NOTE — PROGRESS NOTES
Seen and examined.    Notes reviewed    Feels better - heart rates still @ 100 - 105 yet she is quite comfortable    Tele atrial fibrillation      Lungs clear  Ht irregular - soft PANCHITO  Abd soft  Ext no edema  Neuro intact    CXR cardiomegaly        A/P: PAF - symptomatic - prior SAVR/CABG/Ascending aortic aneurysm repair    Adjust rate control as outlined by Dr. Chaz mancini in the short term    Hopefully home tomorrow    EP follow up after discharge to consider ablation    Reviewed with patient and discussed with her  over the phone.

## 2024-01-22 NOTE — CDS QUERY
Clinical Documentation Clarification    Dear Dr. Guardado, ,   Based on your judgment and the clinical indicators below, can you please clarify the type of heart failure you are treating?  (  x ) Chronic Systolic Heart Failure    (   ) Acute on Chronic Systolic Heart Failure    (   ) Other - please specify:  ___________________________________________________________________________    Risk Factors/Clinical Indicators:   1/21/24 ED Provider: Patient presents to the emergency room with reports of shortness of breath and a dry cough.  She states she woke up and she felt palpitations and then felt like it was hard for her to fully exhale.  She notes for the last several days she has had a dry hacking cough.  She is also had some right sided lateral chest wall pain.   Pro-BNP 8,949  EKG: Atrial fibrillation w/ rapid ventricular response  CXR: No consolidation.  Stable enlarged cardiac silhouette.  Pulmonary vasculature is unremarkable.     1/21 H&P: Shortness of breath rule out, and acute HFrEF and acute HFrEF  -Patient appears to be euvolemic however was dyspneic last night, chest x-ray was done without any fluid overload  -proBNP however was slightly elevated and had subjective shortness of breath, will give a dose of IV Lasix  -Continue ACE, beta-blocker…Echo     1/22 Cardiology Progress Note: Chronic sys CHF, EF 35-40% Aug 2023.  Brief look at bedside echo, EF is down from previous.     1/22 2D Cardiac Echo: Left ventricle: The cavity size was increased. Wall thickness was normal.      Systolic function was reduced. The estimated ejection fraction was      25-30%, by visual assessment. Unable to assess LV diastolic function due      to heart rhythm.     Treatment: Lasix 40 mg IV x 1 dose on 1/21. Change lopressor to Toprol 50 mg BID. Add Cardizem.     For questions regarding this query, please contact Clinical :   Roxana Conklin RN        Cell# [phone]                          Thank you!

## 2024-01-22 NOTE — PROGRESS NOTES
[facility] Pharmacy Dosing Service  Warfarin (Coumadin) Subsequent Dosing    Angeles Anand is a 78 year old patient for whom pharmacy is dosing warfarin (Coumadin). Goal INR is 2-3    Recent Labs   Lab 01/21/24  0659 01/22/24  1028   INR 2.80* 2.95*       Consulted by:  Dr. Guardado  Indication:  Afib, HF  Potential Drug Interactions:  Amiodarone   Other Anticoagulants:  none  Home regimen (if applicable):  2.5 mg qhs    Inpatient Dosing History:    Date 01/21 01/22       INR 2.80 2.95       Coumadin dose 2 mg                 Based on above -  1.  For today, Give warfarin (COUMADIN) 2 mg at 2100 tonight  2   PT/INR ordered daily while on warfarin  3.  Pharmacy will continue to follow.  We appreciate the opportunity to assist in the care of this patient.    Gricelda Blas, PharmD  1/22/2024  2:38 PM

## 2024-01-22 NOTE — PROGRESS NOTES
Cardiology Progress Note        Angeles Anand Patient Status:  Inpatient    3/6/1945 MRN [id number]   Location [facility] 0SW-A Attending Nathan Guardado MD   Hosp Day # 1 PCP Charlene Huang MD     Subjective:   at the bedside.  Echo being done.  No further cp.  HR near 100 bpm average.    Medications:   guaiFENesin ER  600 mg Oral BID    amiodarone  100 mg Oral Daily    anastrozole  1 mg Oral Daily    metoprolol tartrate  25 mg Oral 2x Daily(Beta Blocker)    ramipril  5 mg Oral Daily    atorvastatin  20 mg Oral Nightly       Continuous Infusions:        Allergies:  Allergies   Allergen Reactions    Gadolinium     Radiology Contrast Iodinated Dyes     Kiwi Extract SWELLING         Intake/Output:    Intake/Output Summary (Last 24 hours) at 2024 0850  Last data filed at 2024 0805  Gross per 24 hour   Intake 240 ml   Output --   Net 240 ml           Last 3 Weights   24 1050 125 lb (56.7 kg)   24 0650 125 lb (56.7 kg)   02/15/23 0925 125 lb 8 oz (56.9 kg)   22 0155 130 lb (59 kg)            Physical Exam:    Temp:  [97.6 °F (36.4 °C)-98.3 °F (36.8 °C)] 97.6 °F (36.4 °C)  Pulse:  [103-128] 117  Resp:  [15-20] 17  BP: (102-125)/(63-99) 125/99  SpO2:  [91 %-99 %] 97 %    General: No apparent distress  HEENT: No focal deficits.  Neck: supple. JVP normal  Cardiac: Irregular rhythm, S1, S2 normal,  rub or gallop.  No murmur.  Lungs: CTA  Abdomen: Soft, non-tender.   Extremities: No edema  Neurologic: no focal deficits  Skin: Warm and dry.     Telemetry: fib    EKG:      Echo:      Cardiac Cath:      Labs:  HEM:  Recent Labs   Lab 24  0659   WBC 4.2   HGB 13.4   .0*       Chem:  Recent Labs   Lab 24  0659      K 3.8      CO2 23.0   BUN 25*   CREATSERUM 1.15*   CA 9.4   *       Recent Labs   Lab 24  0659   AST 80*   ALB 3.9       No results for input(s): \"TROP\", \"CK\" in the last 168 hours.    Recent Labs   Lab 24  0659    PTP 29.9*   INR 2.80*                 Impression:  Paroxysmal Atrial fibrillation with rapid ventricular response.  Had been sinus on low dose amiodarone.  Elevated troponins with chest pressure -  68/43/41/36.  Suspect demand from the tachycardia.  Prior coronary bypass surgery with aortic valve replacement, PFO closure and repair of a large ascending thoracic aneurysm  Hx Breast cancer  HTN  HL   Chronic sys CHF, EF 35-40% Aug 2023.  Brief look at bedside echo, EF is down from previous.          Plan:  Change lopressor to Toprol 50 mg BID.  Increase amio to 200 mg TID today.  Continue warfarin.  Will review echo once completed.  Follow tele today and see how she does.  Discussed at length with patient and .  They would like Dr. Mcmanus's input as he has followed her for 20 years.        Jose Carlos Ware MD  1/22/2024  8:50 AM  L3

## 2024-01-22 NOTE — PLAN OF CARE
Patient A&O x4, no c/o pain, room air, 1 L O2 NOC.  Patient's O2 dropping to the 85-88 range through the night, 1 L and patient stays consistent @95%.  Patient has not had another BM this evening, urinated a few times.  Patient's HR has been 100+ through the evening with occasional jump to 120, but has not gone above 130 so no PRN given at this time.

## 2024-01-22 NOTE — CDS QUERY
CLINICAL DOCUMENTATION CLARIFICATION FORM    Dear Dr Guardado,  Clinical information (provided below) documents elevated troponin levels. Based on your clinical judgment, please clarify the diagnosis associated with the elevated troponin levels.  [ x ] Elevated troponin level secondary to demand ischemia related to A-fib with RVR    [  ] Elevated troponin level secondary to NSTEMI type II related to A-fib with RVR    [  ] Other (please specify):       Risk Factors/Clinical Indicators:    1/21/24 ED Provider: Patient presents to the emergency room with reports of shortness of breath and a dry cough.  She states she woke up and she felt palpitations and then felt like it was hard for her to fully exhale.  She notes for the last several days she has had a dry hacking cough.  She also had some right sided lateral chest wall pain.      Troponin I HS: 68 -> 43 -> 41 -> 36 -> 27    1/21 H&P: # A-fib with RVR  # NSTEMI type II  -Heart rate elevated in the 120s, improved and continues to be in A-fib  -Resume beta-blocker 25 twice daily, resume amiodarone as well as Coumadin  -Cardiology consulted, echo pending  -Serial troponin    1/22 Cardiology Progress Note: Paroxysmal Atrial fibrillation with rapid ventricular response.  Had been sinus on low dose amiodarone.  Elevated troponins with chest pressure - 68/43/41/36.  Suspect demand from the tachycardia     Treatment: Serial Troponin levels.        For questions regarding this query, please contact Clinical :   Roxana Conklin RN    Cell# [phone]                         Thank you!

## 2024-01-22 NOTE — PROGRESS NOTES
[facility]   Progress Note  -  Angeles Anand Patient Status:  Inpatient    3/6/1945 MRN [id number]   [address] Attending Nathan Guardado MD   Hosp Day # 1 PCP Charlene Huang MD     PCP: Charlene Huang MD      SEE ATTENDING NOTE AT BOTTOM OF PAGE    Is this a shared or split note between Advanced Practice Provider and Physician? Yes    Assessment and Plan:    78-year-old female signal past medical history of paroxysmal A-fib, a flutter, CAD, history of CABG, hypertension, hyperlipidemia, SAVR, chronic thrombocytopenia, leukopenia chronic combined systolic as well as diastolic heart failure hypothyroidism presented with a further RVR palpitations as well as shortness of breath     # A-fib with RVR  # Elevated troponin related to demand  -Heart rate elevated in the 120s, improved and continues to be in A-fib  -Resume beta-blocker 25 twice daily, resume amiodarone as well as Coumadin  -Increase amiodarone to 203 times daily also added diltiazem with better heart rate control  -Cardiology consulted, echo  completed and reviewed shows EF of 25 to 30%  -Serial troponin, down trending       # Shortness of breath rule out, chronic HFrEF  -Patient appears to be euvolemic however was dyspneic last night, chest x-ray was done without any fluid overload  -proBNP however was slightly elevated and had subjective shortness of breath, will give a dose of IV Lasix  -Shortness of breath significantly improved after the IV Lasix no need for any further Lasix as patient appears to be euvolemic  -Continue to monitor echo-reviewed showed EF of 25 to 30%  -Continue ACE, beta-blocker        # History of CAD and CABG with a SAVR  -TSH and T4 reviewed  -Check echo reviewed, cardiology consulted     # Hypertension-stable resume home ACE as well as beta-blocker      #, Thrombo-cytopenia, appears to be chronic, platelet count of 123    #BREANN  -Resolved and back to baseline  -cr 1.15 yesterday, 1.05  today, downtrending     Prophy:  DVT: Coumadin OT  Deconditioning prevention: PT MedSurg with telemetry     Dispo: admit to MedSurg with telemetry     Outpatient records reviewed confirming patient's medical history and medications.      Further recommendations pending patient's clinical course.  [facility] hospitalist to continue to follow patient while in house    GOC discussion with patient, states she is a full code currently    GOC: Full code    MA/ACO Reach  -Re- Entry: No  -Consults: cardiology  -Discharge Needs: TBD  -Appointments: TBD     FEN  -lytes in am  -diet-cardiac    Prophy  -SCD  -coumadin    Dispo  -pending clinical coarse  PCP: Charlene Huang MD      Concerns regarding plan of care were discussed with patient. Patient agrees with plan as detailed above. Discussed plan of care with Dr. Guardado    Note: This chart was prepared using voice recognition software and may contain unintended word substitution errors.        Jason Herbert PHOEBE  [facility] Hospitalist Team  Contact via [employer] (Check Availability)  1/22/2024    SUBJECTIVE:   She was resting in bed, denies cp/sob/fluttering in chest.  States she feels well. Denies N/V, F/C.       OBJECTIVE:   Blood pressure (!) 125/99, pulse 117, temperature 97.6 °F (36.4 °C), temperature source Oral, resp. rate 17, weight 125 lb (56.7 kg), SpO2 97%, not currently breastfeeding.    GENERAL: no apparent distress  NEURO: A/A Ox3, no focal deficits  RESP: non labored, CTA  CARDIO: irregular, no murmur  ABD: soft, NT, ND, BS+  EXTREMITIES: no edema, no calf tenderness    DIAGNOSTIC DATA:   Labs:     Recent Labs   Lab 01/21/24  0659   WBC 4.2   HGB 13.4   MCV 90.2   .0*   INR 2.80*       Recent Labs   Lab 01/21/24  0659      K 3.8      CO2 23.0   BUN 25*   CREATSERUM 1.15*   CA 9.4   *       Recent Labs   Lab 01/21/24  0659   AST 80*   ALB 3.9       No results for input(s): \"PGLU\" in the last 168 hours.    No  results for input(s): \"TROP\" in the last 168 hours.        MEDICATIONS      Current Facility-Administered Medications   Medication Dose Route Frequency    amiodarone (Pacerone) tab 200 mg  200 mg Oral TID    metoprolol succinate (Toprol XL) partial tablet 25 mg  25 mg Oral 2x Daily(Beta Blocker)    nitroglycerin (Nitrostat) SL tab 0.4 mg  0.4 mg Sublingual Q5 Min PRN    acetaminophen (Tylenol Extra Strength) tab 500 mg  500 mg Oral Q4H PRN    melatonin tab 3 mg  3 mg Oral Nightly PRN    polyethylene glycol (PEG 3350) (Miralax) 17 g oral packet 17 g  17 g Oral Daily PRN    sennosides (Senokot) tab 17.2 mg  17.2 mg Oral Nightly PRN    bisacodyl (Dulcolax) 10 MG rectal suppository 10 mg  10 mg Rectal Daily PRN    ondansetron (Zofran) 4 MG/2ML injection 4 mg  4 mg Intravenous Q6H PRN    metoclopramide (Reglan) 5 mg/mL injection 5 mg  5 mg Intravenous Q8H PRN    guaiFENesin ER (Mucinex) 12 hr tab 600 mg  600 mg Oral BID    anastrozole (Arimidex) tab 1 mg  1 mg Oral Daily    ramipril (Altace) cap 5 mg  5 mg Oral Daily    atorvastatin (Lipitor) tab 20 mg  20 mg Oral Nightly    metoprolol (Lopressor) 5 mg/5mL injection 5 mg  5 mg Intravenous Q6H PRN         IMAGING     XR CHEST AP PORTABLE  (CPT=71045)    Result Date: 1/21/2024  CONCLUSION:  No consolidation.  Stable enlarged cardiac silhouette.  Pulmonary vasculature is unremarkable.   LOCATION:  [facility]      Dictated by (CST): Kulwinder Valencia MD on 1/21/2024 at 7:24 AM     Finalized by (CST): Kulwinder Valencia MD on 1/21/2024 at 7:25 AM          SEE ATTENDING NOTE BELOW:     Patient seen and examined independently and agree as above, heart rate much better controlled with increased amiodarone, added diltiazem, echo reviewed possible discharge tomorrow with outpatient consideration for ablation, discussed with  at bedside

## 2024-01-23 VITALS
RESPIRATION RATE: 18 BRPM | DIASTOLIC BLOOD PRESSURE: 53 MMHG | BODY MASS INDEX: 22 KG/M2 | HEART RATE: 66 BPM | TEMPERATURE: 98 F | SYSTOLIC BLOOD PRESSURE: 96 MMHG | OXYGEN SATURATION: 95 % | WEIGHT: 125 LBS

## 2024-01-23 PROBLEM — I48.91 ATRIAL FIBRILLATION WITH RAPID VENTRICULAR RESPONSE (HCC): Status: RESOLVED | Noted: 2024-01-21 | Resolved: 2024-01-23

## 2024-01-23 PROBLEM — R07.9 CHEST PAIN, RULE OUT ACUTE MYOCARDIAL INFARCTION: Status: RESOLVED | Noted: 2024-01-21 | Resolved: 2024-01-23

## 2024-01-23 LAB
INR BLD: 2.69 (ref 0.8–1.2)
PROTHROMBIN TIME: 29 SECONDS (ref 11.6–14.8)
TROPONIN I SERPL HS-MCNC: 25 NG/L

## 2024-01-23 PROCEDURE — 85610 PROTHROMBIN TIME: CPT | Performed by: HOSPITALIST

## 2024-01-23 RX ORDER — AMIODARONE HYDROCHLORIDE 200 MG/1
200 TABLET ORAL 2 TIMES DAILY WITH MEALS
Status: DISCONTINUED | OUTPATIENT
Start: 2024-01-23 | End: 2024-01-23

## 2024-01-23 RX ORDER — AMIODARONE HYDROCHLORIDE 200 MG/1
TABLET ORAL
Qty: 40 TABLET | Refills: 3 | Status: SHIPPED | OUTPATIENT
Start: 2024-01-23

## 2024-01-23 RX ORDER — METOPROLOL SUCCINATE 50 MG/1
50 TABLET, EXTENDED RELEASE ORAL
Qty: 60 TABLET | Refills: 11 | Status: SHIPPED | OUTPATIENT
Start: 2024-01-23

## 2024-01-23 RX ORDER — METOPROLOL SUCCINATE 50 MG/1
50 TABLET, EXTENDED RELEASE ORAL
Status: DISCONTINUED | OUTPATIENT
Start: 2024-01-23 | End: 2024-01-23

## 2024-01-23 NOTE — PLAN OF CARE
Resumed care at 0730. Aox4, vitals stable. Afib, controlled rate. Denies dizziness or lightheadedness. Ambulatory to bathroom. Tolerating PO diet. Ok to discharge per cardiology and hospitalist. Discharge instructions given and explained to patient and , verbalized understanding. IV removed. Wheelchair to lobby. Discharged with all belongings.  to give ride home.

## 2024-01-23 NOTE — PROGRESS NOTES
Cardiology Progress Note        Angeles Anand Patient Status:  Inpatient    3/6/1945 MRN [id number]   Location [facility] 0SW-A Attending Nathan Guardado MD   Hosp Day # 2 PCP Charlene Huang MD     Subjective:   at the bedside.  No dyspnea or cp.  Rate well controlled.    Medications:   metoprolol succinate ER  50 mg Oral 2x Daily(Beta Blocker)    amiodarone  200 mg Oral BID with meals    guaiFENesin ER  600 mg Oral BID    anastrozole  1 mg Oral Daily    ramipril  5 mg Oral Daily    atorvastatin  20 mg Oral Nightly       Continuous Infusions:        Allergies:  Allergies   Allergen Reactions    Gadolinium     Radiology Contrast Iodinated Dyes     Kiwi Extract SWELLING         Intake/Output:    Intake/Output Summary (Last 24 hours) at 2024 0920  Last data filed at 2024 0800  Gross per 24 hour   Intake 840 ml   Output --   Net 840 ml           Last 3 Weights   24 1050 125 lb (56.7 kg)   24 0650 125 lb (56.7 kg)   02/15/23 0925 125 lb 8 oz (56.9 kg)   22 0155 130 lb (59 kg)            Physical Exam:    Temp:  [97.4 °F (36.3 °C)-99 °F (37.2 °C)] 98.1 °F (36.7 °C)  Pulse:  [76-99] 82  Resp:  [16-18] 18  BP: (100-115)/(52-79) 105/59  SpO2:  [88 %-95 %] 93 %    General: No apparent distress  HEENT: No focal deficits.  Neck: supple. JVP normal  Cardiac: Irregular rhythm, S1, S2 normal,  rub or gallop.  No murmur.  Lungs: CTA  Abdomen: Soft, non-tender.   Extremities: No edema  Neurologic: no focal deficits  Skin: Warm and dry.     Telemetry: fib    EKG:      Echo:      Cardiac Cath:      Labs:  HEM:  Recent Labs   Lab 24  0659 24  1028   WBC 4.2 5.3   HGB 13.4 12.5   .0* 123.0*       Chem:  Recent Labs   Lab 24  0659 24  1028    142   K 3.8 3.4*  3.4*    111   CO2 23.0 25.0   BUN 25* 25*   CREATSERUM 1.15* 1.05*   CA 9.4 8.7   * 132*       Recent Labs   Lab 24  0659   AST 80*   ALB 3.9       No results for  input(s): \"TROP\", \"CK\" in the last 168 hours.    Recent Labs   Lab 01/21/24  0659 01/22/24  1028   PTP 29.9* 31.2*   INR 2.80* 2.95*                 Impression:  Paroxysmal Atrial fibrillation with rapid ventricular response.  Had been sinus on low dose amiodarone, until this admit.  Elevated troponins with chest pressure -  68/43/41/36.  Suspect demand from the tachycardia.  Prior coronary bypass surgery with aortic valve replacement, PFO closure and repair of a large ascending thoracic aneurysm  Hx Breast cancer  HTN  HL   Chronic sys CHF, EF 35-40% Aug 2023.  EF now 25-30%, likely tachy induced.          Plan:  Long talk with her and her .  OK for dc, see Dr. Mcmanus in one week.    Amio 200 mg BID for 7 days, then 200 mg daily.  Change lopressor to Toprol 50 mg BID.  No diltiazem on dc.  Same ramipril and warfarin.        Jose Carlos Ware MD    L3

## 2024-01-23 NOTE — PLAN OF CARE
Patient A&O x4, no c/o pain, room air.  Patient's HR has improved this evening, stable to 70-80s.  Starts to increase to the 90s just before next medication is due.

## 2024-01-23 NOTE — DISCHARGE SUMMARY
[facility]ist Discharge Summary   Patient ID:  Angeles Anand  [id number]  78 year old  3/6/1945    Admit date: 1/21/2024  Discharge date: 1/23/2024    Primary Care Physician: Charlene Huang MD   Attending Physician: Nathan Guardado MD   Consults:   Consultants         Provider   Role Specialty     Nathan Guardado MD  Consulting Physician HOSPITALIST     Anurag Rojo MD  Consulting Physician Cardiovascular Diseases            Hospital Discharge Diagnoses:   Chest pain, rule out acute myocardial infarction  ----See D/C Summary for further Dx    Risk of Readmission Lace+ Score: 64  59-90 High Risk  29-58 Medium Risk  0-28   Low Risk.    TCM Follow-Up Recommendation:  LACE > 58: High Risk of readmission after discharge from the hospital.    Please note that only [facility] and [facility]G patients enrolled in the Medicare ACO, [facility] ACO and [facility] HMOs will be handled by the [facility] Care Management team.  For all other patients, please follow usual protocol for discharge care transition.    Reason for admission  Per H/P Dated 1/21/24 by Dr Guardado  Patient is a 78-year-old female signal past medical history of paroxysmal A-fib, a flutter, CAD, history of CABG, hypertension, hyperlipidemia, SAVR, chronic thrombocytopenia, leukopenia chronic combined systolic as well as diastolic heart failure hypothyroidism presented with a further RVR palpitations as well as shortness of breath.  Patient stated that at 10 PM she woke up secondary to shortness of breath, reports some wheezing, as well as palpitations.  Patient stated that she was unable to sleep all night because of the palpitation and wheezing which is why she presented to the emergency room.  She had no chest pain no tightness no lightheadedness or dizziness no nausea no vomiting no diarrhea no fevers or chills, did have a cough otherwise review of system was completed negative.  Patient has not had A-fib with RVR ever since being on amiodarone     in the  emergency room she was found to be in A-fib with RVR with heart rates between 110s to 120s,     She was afebrile, saturating well on room air, blood pressure was stable at 114/90  Now labs were remarkable for an EKG that showed A-fib with RVR with PVCs, chest x-ray was done that was negative for any fluid or pneumonia creatinine was at 1.15 close to baseline  Troponin was slightly elevated at 68, proBNP was significantly elevated at 8949 and INR was at 2.8, CBC was unremarkable     She had an echo in August 2023 that showed an EF of 35 to 40%     Patient was admitted and cardiology was consulted  (see HPI on HP for further detail)  Hospital Course:     78-year-old female signal past medical history of paroxysmal A-fib, a flutter, CAD, history of CABG, hypertension, hyperlipidemia, SAVR, chronic thrombocytopenia, leukopenia chronic combined systolic as well as diastolic heart failure hypothyroidism presented with a further RVR palpitations as well as shortness of breath     # A-fib with RVR  # Elevated troponin related to demand  -Heart rate elevated in the 120s, improved and continues to be in A-fib  -Resume beta-blocker 25 twice daily, resume amiodarone as well as Coumadin  -Increase amiodarone to 200 3 times daily while inpatient and also added diltiazem with better heart rate control, however cards plan to discontinue diltiazem upon discharge  -Cardiology consulted, echo  completed and reviewed shows EF of 25 to 30%  -cardiology's medication plan upon discharge reviewed with patient and  medardo, amio 200 mg BID for 7 days then 200 mg daily, change lopressor to toprol 50 mg BID, no change to warfarin and ACE  -DC diltiazem        # Shortness of breath rule out, chronic HFrEF  -Patient appears to be euvolemic however was dyspneic last night, chest x-ray was done without any fluid overload  -proBNP however was slightly elevated and had subjective shortness of breath, will give a dose of IV Lasix  -Shortness of  breath significantly improved after the IV Lasix no need for any further Lasix as patient appears to be euvolemic  -Continue to monitor echo-reviewed showed EF of 25 to 30%  -Continue ACE, beta-blocker        # History of CAD and CABG with a SAVR  -TSH and T4 reviewed  d, cardiology consulted, echo reviewed     # Hypertension-stable resume home ACE as well as beta-blocker      #, Thrombo-cytopenia, appears to be chronic, platelet count of 123     #BREANN  -Resolved and back to baseline     Outpatient records reviewed confirming patient's medical history and medications.      Further recommendations pending patient's clinical course.  [facility] hospitalist to continue to follow patient while in house     GOC discussion with patient, states she is a full code currently     GOC: Full code     MA/ACO Reach  -Re- Entry: No  -Consults: cardiology  -Discharge Needs: none  -Appointments: pcp, cards        EXAM: NAD  GENERAL: no apparent distress  NEURO: A/A Ox3  RESP: non labored, CTA  CARDIO: irregular, no murmur  ABD: soft, NT, ND, BS+  EXTREMITIES: no edema, no calf tenderness    Operative Procedures:   Radiology:   XR CHEST AP PORTABLE  (CPT=71045)    Result Date: 1/21/2024  CONCLUSION:  No consolidation.  Stable enlarged cardiac silhouette.  Pulmonary vasculature is unremarkable.   LOCATION:  [facility]      Dictated by (CST): Kulwinder Valencia MD on 1/21/2024 at 7:24 AM     Finalized by (CST): Kulwinder Valencia MD on 1/21/2024 at 7:25 AM        Discharge Instructions     Medication List        ASK your doctor about these medications      amiodarone 200 MG Tabs  Commonly known as: Pacerone     anastrozole 1 MG Tabs tab  Commonly known as: Arimidex  Take 1 tablet (1 mg total) by mouth daily.     CALTRATE 600 PLUS-VIT D OR     metoprolol tartrate 25 MG Tabs  Commonly known as: Lopressor     Mometasone Furoate 0.1 % Crea  Commonly known as: ELOCON  Apply to affected areas of the forehead/eyebrows BID x 1 week. Hold x1 week. Repeat  as needed.     ramipril 5 MG Caps  Commonly known as: Altace  Take 1 capsule (5 mg total) by mouth daily.     simvastatin 40 MG Tabs  Commonly known as: Zocor     * warfarin 2.5 MG Tabs  Commonly known as: Coumadin     * warfarin 2.5 MG Tabs  Commonly known as: Coumadin           * This list has 2 medication(s) that are the same as other medications prescribed for you. Read the directions carefully, and ask your doctor or other care provider to review them with you.                    Activity: activity as tolerated  Diet: cardiac diet  Wound Care: none needed  Code Status: Prior    Important follow up:   Follow-up Information       Ritesh Guerra MD Follow up in 1 week(s).    Specialty: CARDIOLOGY  Contact information:  [address] [place] [address]  [phone]                             -PCP in [x] within 3 days [] within 14 days [] other     Disposition: home  Discharged Condition: good    =========================================================================================================================    I Reconciled current and discharge medications on day of discharge  Patient had opportunity to ask questions and state understand and agree with therapeutic plan as outlined    Total Time Coordinating Care: greater than 30 minutes  Is this a shared or split note between Advanced Practice Provider and Physician? Yes    Note: This chart was prepared using voice recognition software and may contain unintended word substitution errors.     Jason DIAS  [facility] Hospitalist Team   1/23/2024      SEE ATTENDING NOTE BELOW          Patient seen and evaluated by me independently and I agree with above, discharged on increased dose of amiodarone, follow-up with cardiology as an outpatient for discussions and ablation

## 2024-01-23 NOTE — PROGRESS NOTES
[facility] Pharmacy Dosing Service  Warfarin (Coumadin) Subsequent Dosing    Angeles Anand is a 78 year old patient for whom pharmacy is dosing warfarin (Coumadin). Goal INR is 2-3    Recent Labs   Lab 01/21/24  0659 01/22/24  1028   INR 2.80* 2.95*       Consulted by:  Dr. Guardado  Indication:  atrial fibrillation  Potential Drug Interactions:  amiodarone (dose increased today from 100mg daily to 200mg TID)  Other Anticoagulants:  none  Home regimen (if applicable):  2.5mg daily    Inpatient Dosing History:    Date 1/21/24 1/22/24   INR 2.8 2.95   Coumadin dose 2mg 1mg            Based on above -  1.  For today, Give warfarin (COUMADIN) 1 mg at 2100 tonight  2   PT/INR ordered daily while on warfarin  3.  Pharmacy will continue to follow.  We appreciate the opportunity to assist in the care of this patient.    Anabel Estes, PharmD  1/22/2024  7:41 PM

## 2024-01-23 NOTE — PLAN OF CARE
Assumed patient care at 0730.  Vital signs stable. Patient alert and oriented x 4.  Patient denies pain.   at bedside, involved in plan of care.  Patient's heart rate in the 90s-low 100's.  Remains controlled at these rates when out of bed.  Possible discharge tomorrow.

## 2024-01-29 ENCOUNTER — HOSPITAL ENCOUNTER (OUTPATIENT)
Dept: LAB | Facility: HOSPITAL | Age: 79
Discharge: HOME | End: 2024-01-29
Attending: INTERNAL MEDICINE
Payer: MEDICARE

## 2024-01-29 DIAGNOSIS — I48.0 PAROXYSMAL ATRIAL FIBRILLATION (HCC): ICD-10-CM

## 2024-01-29 LAB — INR BLDC: 2.9 (ref 0.9–1.1)

## 2024-01-29 PROCEDURE — 85610 PROTHROMBIN TIME: CPT

## 2024-02-04 ENCOUNTER — APPOINTMENT (OUTPATIENT)
Dept: CT IMAGING | Facility: HOSPITAL | Age: 79
End: 2024-02-04
Payer: MEDICARE

## 2024-02-04 ENCOUNTER — HOSPITAL ENCOUNTER (EMERGENCY)
Facility: HOSPITAL | Age: 79
Discharge: HOME | End: 2024-02-04
Attending: EMERGENCY MEDICINE
Payer: MEDICARE

## 2024-02-04 VITALS
WEIGHT: 125 LBS | SYSTOLIC BLOOD PRESSURE: 133 MMHG | RESPIRATION RATE: 21 BRPM | HEIGHT: 64 IN | HEART RATE: 57 BPM | TEMPERATURE: 99 F | OXYGEN SATURATION: 93 % | BODY MASS INDEX: 21.34 KG/M2 | DIASTOLIC BLOOD PRESSURE: 67 MMHG

## 2024-02-04 DIAGNOSIS — N39.0 URINARY TRACT INFECTION WITHOUT HEMATURIA, SITE UNSPECIFIED: ICD-10-CM

## 2024-02-04 DIAGNOSIS — S00.03XA CONTUSION OF SCALP, INITIAL ENCOUNTER: Primary | ICD-10-CM

## 2024-02-04 DIAGNOSIS — R79.1 ELEVATED INR: ICD-10-CM

## 2024-02-04 LAB
ANION GAP SERPL CALC-SCNC: 1 MMOL/L (ref 0–18)
BASOPHILS # BLD AUTO: 0.02 X10(3) UL (ref 0–0.2)
BASOPHILS NFR BLD AUTO: 0.4 %
BILIRUB UR QL STRIP.AUTO: NEGATIVE
BUN BLD-MCNC: 24 MG/DL (ref 9–23)
CALCIUM BLD-MCNC: 8.6 MG/DL (ref 8.5–10.1)
CHLORIDE SERPL-SCNC: 114 MMOL/L (ref 98–112)
CLARITY UR REFRACT.AUTO: CLEAR
CO2 SERPL-SCNC: 26 MMOL/L (ref 21–32)
COLOR UR AUTO: YELLOW
CREAT BLD-MCNC: 1.06 MG/DL
EGFRCR SERPLBLD CKD-EPI 2021: 54 ML/MIN/1.73M2 (ref 60–?)
EOSINOPHIL # BLD AUTO: 0.01 X10(3) UL (ref 0–0.7)
EOSINOPHIL NFR BLD AUTO: 0.2 %
ERYTHROCYTE [DISTWIDTH] IN BLOOD BY AUTOMATED COUNT: 13.4 %
GLUCOSE BLD-MCNC: 111 MG/DL (ref 70–99)
GLUCOSE UR STRIP.AUTO-MCNC: NORMAL MG/DL
HCT VFR BLD AUTO: 38.9 %
HGB BLD-MCNC: 12.3 G/DL
IMM GRANULOCYTES # BLD AUTO: 0.02 X10(3) UL (ref 0–1)
IMM GRANULOCYTES NFR BLD: 0.4 %
INR BLD: 3.45 (ref 0.8–1.2)
KETONES UR STRIP.AUTO-MCNC: NEGATIVE MG/DL
LEUKOCYTE ESTERASE UR QL STRIP.AUTO: 75
LYMPHOCYTES # BLD AUTO: 1.95 X10(3) UL (ref 1–4)
LYMPHOCYTES NFR BLD AUTO: 38 %
MCH RBC QN AUTO: 28.5 PG (ref 26–34)
MCHC RBC AUTO-ENTMCNC: 31.6 G/DL (ref 31–37)
MCV RBC AUTO: 90.3 FL
MONOCYTES # BLD AUTO: 1.09 X10(3) UL (ref 0.1–1)
MONOCYTES NFR BLD AUTO: 21.2 %
NEUTROPHILS # BLD AUTO: 2.04 X10 (3) UL (ref 1.5–7.7)
NEUTROPHILS # BLD AUTO: 2.04 X10(3) UL (ref 1.5–7.7)
NEUTROPHILS NFR BLD AUTO: 39.8 %
NITRITE UR QL STRIP.AUTO: NEGATIVE
OSMOLALITY SERPL CALC.SUM OF ELEC: 297 MOSM/KG (ref 275–295)
PH UR STRIP.AUTO: 5.5 [PH] (ref 5–8)
PLATELET # BLD AUTO: 163 10(3)UL (ref 150–450)
POTASSIUM SERPL-SCNC: 3.8 MMOL/L (ref 3.5–5.1)
PROT UR STRIP.AUTO-MCNC: 30 MG/DL
PROTHROMBIN TIME: 35.3 SECONDS (ref 11.6–14.8)
RBC # BLD AUTO: 4.31 X10(6)UL
SODIUM SERPL-SCNC: 141 MMOL/L (ref 136–145)
SP GR UR STRIP.AUTO: >1.03 (ref 1–1.03)
UROBILINOGEN UR STRIP.AUTO-MCNC: NORMAL MG/DL
WBC # BLD AUTO: 5.1 X10(3) UL (ref 4–11)

## 2024-02-04 PROCEDURE — 85025 COMPLETE CBC W/AUTO DIFF WBC: CPT | Performed by: EMERGENCY MEDICINE

## 2024-02-04 PROCEDURE — 87086 URINE CULTURE/COLONY COUNT: CPT | Performed by: EMERGENCY MEDICINE

## 2024-02-04 PROCEDURE — 80048 BASIC METABOLIC PNL TOTAL CA: CPT | Performed by: EMERGENCY MEDICINE

## 2024-02-04 PROCEDURE — 99284 EMERGENCY DEPT VISIT MOD MDM: CPT

## 2024-02-04 PROCEDURE — 85610 PROTHROMBIN TIME: CPT | Performed by: EMERGENCY MEDICINE

## 2024-02-04 PROCEDURE — 70450 CT HEAD/BRAIN W/O DYE: CPT | Performed by: EMERGENCY MEDICINE

## 2024-02-04 PROCEDURE — 36415 COLL VENOUS BLD VENIPUNCTURE: CPT

## 2024-02-04 PROCEDURE — 99285 EMERGENCY DEPT VISIT HI MDM: CPT

## 2024-02-04 PROCEDURE — 81001 URINALYSIS AUTO W/SCOPE: CPT | Performed by: EMERGENCY MEDICINE

## 2024-02-04 RX ORDER — CEPHALEXIN 500 MG/1
500 CAPSULE ORAL 4 TIMES DAILY
Qty: 28 CAPSULE | Refills: 0 | Status: SHIPPED | OUTPATIENT
Start: 2024-02-04 | End: 2024-02-12

## 2024-02-04 RX ORDER — CEPHALEXIN 500 MG/1
500 CAPSULE ORAL ONCE
Status: COMPLETED | OUTPATIENT
Start: 2024-02-04 | End: 2024-02-04

## 2024-02-04 NOTE — ED INITIAL ASSESSMENT (HPI)
To the ED with c/o losing balance when emptying the washer/dryer. States she was bending over when she lost her balance and fell over and hit her head on the door jam. Patient is on BLOOD THINNERS. States she did not pass out, or vomiting. Patient has lac to back left of her scalp. States she is feeling weak.

## 2024-02-04 NOTE — ED PROVIDER NOTES
Patient Seen in: [facility] Emergency Department      History     Chief Complaint   Patient presents with    Fall     Stated Complaint: fell, hit head, no LOC, on coumadin    Subjective:   HPI    Patient with a history of paroxysmal atrial fibrillation, thrombocytopenia, systolic and diastolic heart failure and chronic shortness of breath, coronary artery disease, status post bypass surgery, hypertension, renal insufficiency among others.  She is on Coumadin.  Patient reports that she briefly lost balance while emptying the washer dryer.  She was bending over and fell hitting her head on the door jam.  Patient reports that she felt weak in her legs and could not get back into upright position.  She did not lose consciousness.  She has a laceration on her occipital scalp.   Patient was seen in the office less than a week ago after fall.  Patient was getting into the bathtub at that time and caught her foot falling hitting her back/ribs on the right side of the tub.  No head injury or neck pain at that time.  Patient was prescribed Tylenol with codeine at that time.  X-ray showed no right rib fracture  Patient reports that she has been eating well.  No vomiting or diarrhea.  No black or bloody stool.  No urinary symptoms.  No high fever shaking chills.  No cold or cough.  She denies any chest pain, back pain, or abdominal pain.  No focal numbness or weakness.  No hip pain.  No neck pain, even with movement    Objective:   Past Medical History:   Diagnosis Date    Arrhythmia     ATRIAL FIBRILLATION     Dr. Guerra    Atrial flutter (HCC)     Breast cancer (HCC) 2013    INVASIVE DUCTAL    CANCER 10/96    breast CIS- ?ductal     Cancer (HCC)     CORONARY ARTERY DISEASE     Endocrine disorder     Gout     History of blood transfusion     Hypertension     Lymphedema of upper extremity following lymphadenectomy 2/21/2014    Migraines     Osteopenia     Other and unspecified hyperlipidemia     Unspecified essential  hypertension     Visual impairment               Past Surgical History:   Procedure Laterality Date    ANGIOGRAM      APPENDECTOMY      APPENDECTOMY      CABG  2004    HYSTERECTOMY      MIKE/BSO- fibroid uterus    LUMPECTOMY LEFT  2013    invasive ductal    COLBY LOCALIZATION WIRE 1 SITE LEFT (CPT=19281)  ;    NEEDLE BIOPSY LEFT      us core bx-papilloma    NEEDLE BIOPSY RIGHT  2013    mri bx-sclerosing adenosis    OOPHORECTOMY  1997    OTHER  2015    thoracic aortic aneurysm repair    OTHER SURGICAL HISTORY  2004    Surgery for aortic dissection- aortic root repair, aortic valve replacement, single CABG    OTHER SURGICAL HISTORY       left breast biopsy in  and     OTHER SURGICAL HISTORY  3/1/2013    re-excision left lumpectomy    RADIATION LEFT  2013                Social History     Socioeconomic History    Marital status:    Tobacco Use    Smoking status: Former     Types: Cigarettes     Quit date: 1988     Years since quittin.0    Smokeless tobacco: Never   Vaping Use    Vaping Use: Never used   Substance and Sexual Activity    Alcohol use: Yes     Alcohol/week: 4.0 standard drinks of alcohol     Types: 4 Glasses of wine per week     Comment: occasionally 4 glasses red wine weekly    Drug use: No   Other Topics Concern    Seat Belt Yes     Social Determinants of Health     Food Insecurity: No Food Insecurity (2024)    Food Insecurity     Food Insecurity: Never true   Transportation Needs: No Transportation Needs (2024)    Transportation Needs     Lack of Transportation: No   Housing Stability: Low Risk  (2024)    Housing Stability     Housing Instability: No              Review of Systems    Positive for stated complaint: fell, hit head, no LOC, on coumadin  Other systems are as noted in HPI.  Constitutional and vital signs reviewed.      All other systems reviewed and negative except as noted above.    Physical Exam     ED Triage Vitals [24  1522]   /63   Pulse 55   Resp 14   Temp 98.8 °F (37.1 °C)   Temp src Temporal   SpO2 94 %   O2 Device None (Room air)       Current:/67   Pulse 57   Temp 98.8 °F (37.1 °C) (Temporal)   Resp 21   Ht 162.6 cm (5' 4\")   Wt 56.7 kg   SpO2 93%   BMI 21.46 kg/m²         Physical Exam  General: The patient is awake, alert, conversant.  She answers questions quickly and appropriately.  She is very pleasant.  There is facial symmetry.  Face is atraumatic.  Scalp remarkable for a 1 cm area of linear contusion with a central pinpoint area of venous oozing.  No gaping wound amenable to suturing   eyes: sclera white, conjunctiva pink and moist.  Lids and lashes are normal.  Pupils equal, round, and reactive.  Throat: Posterior pharynx is normal.  Oromucosa lips are moist.  Jaw has good nontender active range of motion  Neck: Nontender to palpation in the midline and paraspinal musculature and nontender with full active nontender range of motion  Back: Kyphotic without tenderness in the midline and paraspinal musculature.  No bony deformity or bruising is noted  Lungs: Clear to auscultation bilaterally.  No rhonchi or rales.  Heart: Normal S1 and S2, without murmur.  Distal pulses are strong and symmetric.  Abdomen: Soft, nondistended.  Completely nontender even with deep palpation  Extremities: Hips have good nontender active range of motion.  No ankle edema.  Skin: Somewhat pale  Neurologic:  Mental status as above.  Patient moves all extremities with good strength.  Finger-nose coordination intact         ED Course     Labs Reviewed   BASIC METABOLIC PANEL (8) - Abnormal; Notable for the following components:       Result Value    Glucose 111 (*)     Chloride 114 (*)     BUN 24 (*)     Creatinine 1.06 (*)     Calculated Osmolality 297 (*)     eGFR-Cr 54 (*)     All other components within normal limits   URINALYSIS, ROUTINE - Abnormal; Notable for the following components:    Spec Gravity >1.030 (*)      Blood Urine 1+ (*)     Protein Urine 30 (*)     Leukocyte Esterase Urine 75 (*)     WBC Urine 11-20 (*)     Squamous Epi. Cells Few (*)     All other components within normal limits   PROTHROMBIN TIME (PT) - Abnormal; Notable for the following components:    PT 35.3 (*)     INR 3.45 (*)     All other components within normal limits   CBC W/ DIFFERENTIAL - Abnormal; Notable for the following components:    Monocyte Absolute 1.09 (*)     All other components within normal limits   CBC WITH DIFFERENTIAL WITH PLATELET    Narrative:     The following orders were created for panel order CBC With Differential With Platelet.  Procedure                               Abnormality         Status                     ---------                               -----------         ------                     CBC W/ DIFFERENTIAL[[id number]]          Abnormal            Final result                 Please view results for these tests on the individual orders.   URINE CULTURE, ROUTINE                      MDM      Patient had a nonsyncopal fall sustaining a laceration to occipital scalp.  She was feeling generally weak.  Metabolic and infectious etiologies considered.  She denies any sort of chest pain, palpitations, shortness of breath preceding or since the fall.  Intracranial injury such as subdural hematoma must be excluded considering patient is on blood thinner Coumadin  Fortunately, no neck pain even with full active range of motion.  Scalp laceration copious irrigated per nursing protocol and anesthetized topically  On reinspection, there was a pinpoint area of some venous oozing.  Wound is not really amenable to suturing    Metabolic panel  CBC shows normal white count, hemoglobin, and platelets  Urinalysis shows high specific gravity but negative ketones.  There was positive leukocytes and 11-20 WBCs  INR 3.45    CT brain  I personally reviewed the actual radiographs themselves and my individual interpretation shows no skull  fracture or subdural hematoma  radiologist's formal interpretation which I have reviewed  CONCLUSION:  Chronic changes in the brain, as described, but no acute intracranial bleed, or other acute intracranial process identified.       I reviewed the results of the workup with the patient and her .  Patient tolerated p.o. fluid challenge  I will start on Keflex antibiotic for the possible early UTI and send off urine culture    Patient reports feeling well enough to be discharged home.  I recommend fluids, rest, Tylenol for pain, good wound care, and follow-up with her primary care doctor for reexamination and culture check after few days.   in agreement with this plan.  Head injury and wound care instructions provided.  I recommend they contact the Coumadin clinic for recommendations regarding your Coumadin dose for the INR of 3.45 today.                                   Medical Decision Making      Disposition and Plan     Clinical Impression:  1. Contusion of scalp, initial encounter    2. Urinary tract infection without hematuria, site unspecified    3. Elevated INR         Disposition:  Discharge  2/4/2024  6:39 pm    Follow-up:  Ritesh Guerra MD  [address] [place] [id number]  [phone]    Follow up            Medications Prescribed:  Current Discharge Medication List        START taking these medications    Details   cephalexin 500 MG Oral Cap Take 1 capsule (500 mg total) by mouth 4 (four) times daily for 7 days.  Qty: 28 capsule, Refills: 0

## 2024-02-05 NOTE — DISCHARGE INSTRUCTIONS
Good wound care  You may apply a cool compress to the head injury 20 minutes at a time  Tylenol for pain    Contact your Coumadin clinic for recommendations regarding your Coumadin dose as the INR is elevated at 3.45 today    Review the head injury instructions    Contact your primary care doctor in the morning to arrange wound check and urine culture check in about 3 to 5 days    Keflex antibiotic

## 2024-02-05 NOTE — ED QUICK NOTES
Patient resting in bed with  at bedside.  Call light with patient, side rails up, unlabored respirations

## 2024-02-06 ENCOUNTER — HOSPITAL ENCOUNTER (INPATIENT)
Facility: HOSPITAL | Age: 79
LOS: 6 days | Discharge: HOME | End: 2024-02-12
Attending: EMERGENCY MEDICINE | Admitting: INTERNAL MEDICINE
Payer: MEDICARE

## 2024-02-06 DIAGNOSIS — I48.91 ATRIAL FIBRILLATION WITH RAPID VENTRICULAR RESPONSE (HCC): Primary | ICD-10-CM

## 2024-02-06 DIAGNOSIS — I50.9 ACUTE ON CHRONIC CONGESTIVE HEART FAILURE, UNSPECIFIED HEART FAILURE TYPE (HCC): ICD-10-CM

## 2024-02-06 LAB
ALBUMIN SERPL-MCNC: 3.6 G/DL (ref 3.4–5)
ALBUMIN/GLOB SERPL: 1 {RATIO} (ref 1–2)
ALP LIVER SERPL-CCNC: 77 U/L
ALT SERPL-CCNC: 49 U/L
ANION GAP SERPL CALC-SCNC: 5 MMOL/L (ref 0–18)
AST SERPL-CCNC: 36 U/L (ref 15–37)
BASOPHILS # BLD AUTO: 0.02 X10(3) UL (ref 0–0.2)
BASOPHILS NFR BLD AUTO: 0.3 %
BILIRUB SERPL-MCNC: 0.8 MG/DL (ref 0.1–2)
BUN BLD-MCNC: 19 MG/DL (ref 9–23)
CALCIUM BLD-MCNC: 9.1 MG/DL (ref 8.5–10.1)
CHLORIDE SERPL-SCNC: 108 MMOL/L (ref 98–112)
CO2 SERPL-SCNC: 26 MMOL/L (ref 21–32)
CREAT BLD-MCNC: 0.88 MG/DL
EGFRCR SERPLBLD CKD-EPI 2021: 68 ML/MIN/1.73M2 (ref 60–?)
EOSINOPHIL # BLD AUTO: 0.01 X10(3) UL (ref 0–0.7)
EOSINOPHIL NFR BLD AUTO: 0.2 %
ERYTHROCYTE [DISTWIDTH] IN BLOOD BY AUTOMATED COUNT: 13.4 %
GLOBULIN PLAS-MCNC: 3.5 G/DL (ref 2.8–4.4)
GLUCOSE BLD-MCNC: 116 MG/DL (ref 70–99)
HCT VFR BLD AUTO: 40.8 %
HGB BLD-MCNC: 13 G/DL
IMM GRANULOCYTES # BLD AUTO: 0.02 X10(3) UL (ref 0–1)
IMM GRANULOCYTES NFR BLD: 0.3 %
INR BLD: 3.55 (ref 0.8–1.2)
LYMPHOCYTES # BLD AUTO: 1.49 X10(3) UL (ref 1–4)
LYMPHOCYTES NFR BLD AUTO: 22.6 %
MCH RBC QN AUTO: 28 PG (ref 26–34)
MCHC RBC AUTO-ENTMCNC: 31.9 G/DL (ref 31–37)
MCV RBC AUTO: 87.9 FL
MONOCYTES # BLD AUTO: 1.85 X10(3) UL (ref 0.1–1)
MONOCYTES NFR BLD AUTO: 28.1 %
NEUTROPHILS # BLD AUTO: 3.19 X10 (3) UL (ref 1.5–7.7)
NEUTROPHILS # BLD AUTO: 3.19 X10(3) UL (ref 1.5–7.7)
NEUTROPHILS NFR BLD AUTO: 48.5 %
OSMOLALITY SERPL CALC.SUM OF ELEC: 291 MOSM/KG (ref 275–295)
PLATELET # BLD AUTO: 174 10(3)UL (ref 150–450)
POTASSIUM SERPL-SCNC: 4.1 MMOL/L (ref 3.5–5.1)
PROT SERPL-MCNC: 7.1 G/DL (ref 6.4–8.2)
PROTHROMBIN TIME: 36.1 SECONDS (ref 11.6–14.8)
RBC # BLD AUTO: 4.64 X10(6)UL
SODIUM SERPL-SCNC: 139 MMOL/L (ref 136–145)
TROPONIN I SERPL HS-MCNC: 16 NG/L
WBC # BLD AUTO: 6.6 X10(3) UL (ref 4–11)

## 2024-02-06 RX ORDER — ATORVASTATIN CALCIUM 40 MG/1
40 TABLET, FILM COATED ORAL NIGHTLY
Status: DISCONTINUED | OUTPATIENT
Start: 2024-02-07 | End: 2024-02-09

## 2024-02-06 RX ORDER — FUROSEMIDE 10 MG/ML
40 INJECTION INTRAMUSCULAR; INTRAVENOUS ONCE
Status: COMPLETED | OUTPATIENT
Start: 2024-02-06 | End: 2024-02-06

## 2024-02-06 RX ORDER — FUROSEMIDE 10 MG/ML
40 INJECTION INTRAMUSCULAR; INTRAVENOUS
Status: DISCONTINUED | OUTPATIENT
Start: 2024-02-07 | End: 2024-02-08

## 2024-02-06 RX ORDER — ANASTROZOLE 1 MG/1
1 TABLET ORAL DAILY
Status: DISCONTINUED | OUTPATIENT
Start: 2024-02-06 | End: 2024-02-12

## 2024-02-06 RX ORDER — ONDANSETRON 2 MG/ML
4 INJECTION INTRAMUSCULAR; INTRAVENOUS EVERY 6 HOURS PRN
Status: DISCONTINUED | OUTPATIENT
Start: 2024-02-06 | End: 2024-02-08

## 2024-02-06 RX ORDER — RAMIPRIL 5 MG/1
5 CAPSULE ORAL DAILY
Status: DISCONTINUED | OUTPATIENT
Start: 2024-02-06 | End: 2024-02-08

## 2024-02-06 RX ORDER — METOPROLOL SUCCINATE 50 MG/1
50 TABLET, EXTENDED RELEASE ORAL
Status: DISCONTINUED | OUTPATIENT
Start: 2024-02-06 | End: 2024-02-10

## 2024-02-06 RX ORDER — POLYETHYLENE GLYCOL 3350 17 G/17G
17 POWDER, FOR SOLUTION ORAL DAILY PRN
Status: DISCONTINUED | OUTPATIENT
Start: 2024-02-06 | End: 2024-02-12

## 2024-02-06 RX ORDER — DILTIAZEM HYDROCHLORIDE 5 MG/ML
10 INJECTION INTRAVENOUS ONCE
Status: COMPLETED | OUTPATIENT
Start: 2024-02-06 | End: 2024-02-06

## 2024-02-06 RX ORDER — METOPROLOL SUCCINATE 50 MG/1
50 TABLET, EXTENDED RELEASE ORAL
Status: DISCONTINUED | OUTPATIENT
Start: 2024-02-06 | End: 2024-02-06

## 2024-02-06 RX ORDER — BISACODYL 10 MG
10 SUPPOSITORY, RECTAL RECTAL
Status: DISCONTINUED | OUTPATIENT
Start: 2024-02-06 | End: 2024-02-12

## 2024-02-06 RX ORDER — ENEMA 19; 7 G/133ML; G/133ML
1 ENEMA RECTAL ONCE AS NEEDED
Status: DISCONTINUED | OUTPATIENT
Start: 2024-02-06 | End: 2024-02-12

## 2024-02-06 RX ORDER — AMIODARONE HYDROCHLORIDE 200 MG/1
200 TABLET ORAL DAILY
Status: DISCONTINUED | OUTPATIENT
Start: 2024-02-06 | End: 2024-02-06

## 2024-02-06 RX ORDER — AMIODARONE HYDROCHLORIDE 200 MG/1
200 TABLET ORAL DAILY
Status: DISCONTINUED | OUTPATIENT
Start: 2024-02-06 | End: 2024-02-07

## 2024-02-06 RX ORDER — ATORVASTATIN CALCIUM 40 MG/1
40 TABLET, FILM COATED ORAL NIGHTLY
Status: DISCONTINUED | OUTPATIENT
Start: 2024-02-06 | End: 2024-02-06

## 2024-02-06 RX ORDER — SENNOSIDES 8.6 MG
17.2 TABLET ORAL NIGHTLY PRN
Status: DISCONTINUED | OUTPATIENT
Start: 2024-02-06 | End: 2024-02-12

## 2024-02-06 RX ORDER — ATORVASTATIN CALCIUM 20 MG/1
20 TABLET, FILM COATED ORAL NIGHTLY
Status: DISCONTINUED | OUTPATIENT
Start: 2024-02-06 | End: 2024-02-06

## 2024-02-06 RX ORDER — MELATONIN
3 NIGHTLY PRN
Status: DISCONTINUED | OUTPATIENT
Start: 2024-02-06 | End: 2024-02-12

## 2024-02-06 RX ORDER — METOCLOPRAMIDE HYDROCHLORIDE 5 MG/ML
5 INJECTION INTRAMUSCULAR; INTRAVENOUS EVERY 8 HOURS PRN
Status: DISCONTINUED | OUTPATIENT
Start: 2024-02-06 | End: 2024-02-12

## 2024-02-06 RX ORDER — ACETAMINOPHEN 500 MG
500 TABLET ORAL EVERY 4 HOURS PRN
Status: DISCONTINUED | OUTPATIENT
Start: 2024-02-06 | End: 2024-02-12

## 2024-02-06 NOTE — ED PROVIDER NOTES
Patient Seen in: [facility] Department      History     Chief Complaint   Patient presents with    Arrythmia/Palpitations    Difficulty Breathing     Stated Complaint: from mci, afib rvr, abnormal cxr    Subjective:   HPI    Patient is a 78-year-old female with history of A-fib, heart failure, coronary disease.  PCP today for swelling both legs.  Poor appetite.  She was tachycardic with rate 100.She followed up with cardiology appointment for suspicion of acute heart failure she had labs performed showing elevated BNP.  She has been short of breath with activity.  She is not on diuresis at home.  No chest pain no other complaints.    Objective:   Past Medical History:   Diagnosis Date    Arrhythmia     ATRIAL FIBRILLATION     Dr. Guerra    Atrial flutter (HCC)     Breast cancer (HCC) 2013    INVASIVE DUCTAL    CANCER 10/96    breast CIS- ?ductal     Cancer (HCC)     CORONARY ARTERY DISEASE     Endocrine disorder     Gout     History of blood transfusion     Hypertension     Lymphedema of upper extremity following lymphadenectomy 2/21/2014    Migraines     Osteopenia     Other and unspecified hyperlipidemia     Unspecified essential hypertension     Visual impairment               Past Surgical History:   Procedure Laterality Date    ANGIOGRAM      APPENDECTOMY      APPENDECTOMY      CABG  2004    HYSTERECTOMY  1997    MIKE/BSO- fibroid uterus    LUMPECTOMY LEFT  Jan. 2013    invasive ductal    COLBY LOCALIZATION WIRE 1 SITE LEFT (CPT=19281)  1998;1996    NEEDLE BIOPSY LEFT  2012    us core bx-papilloma    NEEDLE BIOPSY RIGHT  2/2013    mri bx-sclerosing adenosis    OOPHORECTOMY  1997    OTHER  2015    thoracic aortic aneurysm repair    OTHER SURGICAL HISTORY  2004    Surgery for aortic dissection- aortic root repair, aortic valve replacement, single CABG    OTHER SURGICAL HISTORY       left breast biopsy in 1996 and 1998    OTHER SURGICAL HISTORY  3/1/2013    re-excision left lumpectomy    RADIATION  LEFT  2013                Social History     Socioeconomic History    Marital status:    Tobacco Use    Smoking status: Former     Types: Cigarettes     Quit date: 1988     Years since quittin.0    Smokeless tobacco: Never   Vaping Use    Vaping Use: Never used   Substance and Sexual Activity    Alcohol use: Yes     Alcohol/week: 4.0 standard drinks of alcohol     Types: 4 Glasses of wine per week     Comment: occasionally 4 glasses red wine weekly    Drug use: No   Other Topics Concern    Seat Belt Yes     Social Determinants of Health     Food Insecurity: No Food Insecurity (2024)    Food Insecurity     Food Insecurity: Never true   Transportation Needs: No Transportation Needs (2024)    Transportation Needs     Lack of Transportation: No   Housing Stability: Low Risk  (2024)    Housing Stability     Housing Instability: No              Review of Systems    Positive for stated complaint: from mci, afib rvr, abnormal cxr  Other systems are as noted in HPI.  Constitutional and vital signs reviewed.      All other systems reviewed and negative except as noted above.    Physical Exam     ED Triage Vitals [24 1658]   /77   Pulse 88   Resp 18   Temp 98.5 °F (36.9 °C)   Temp src Temporal   SpO2 98 %   O2 Device None (Room air)       Current:/77   Pulse 88   Temp 98.5 °F (36.9 °C) (Temporal)   Resp 18   SpO2 98%         Physical Exam  Vitals and nursing note reviewed.   Constitutional:       General: She is not in acute distress.     Appearance: She is well-developed. She is not toxic-appearing.   HENT:      Head: Normocephalic and atraumatic.   Eyes:      General: No scleral icterus.     Conjunctiva/sclera: Conjunctivae normal.   Cardiovascular:      Rate and Rhythm: Tachycardia present. Rhythm irregular.   Pulmonary:      Effort: Pulmonary effort is normal. No respiratory distress.      Breath sounds: Rales present.   Abdominal:      General: There is no  distension.   Musculoskeletal:         General: No tenderness. Normal range of motion.      Cervical back: Normal range of motion and neck supple.   Skin:     General: Skin is warm and dry.      Findings: No rash.   Neurological:      Mental Status: She is alert and oriented to person, place, and time.      Motor: No abnormal muscle tone.      Coordination: Coordination normal.   Psychiatric:         Behavior: Behavior normal.              ED Course     Labs Reviewed   CBC WITH DIFFERENTIAL WITH PLATELET   COMP METABOLIC PANEL (14)   TROPONIN I HIGH SENSITIVITY   PROTHROMBIN TIME (PT)   RAINBOW DRAW LAVENDER   RAINBOW DRAW LIGHT GREEN   RAINBOW DRAW BLUE     EKG    Rate, intervals and axes as noted on EKG Report.  Rate: 108  Rhythm: Atrial Fibrillation  Reading: Rapid atrial fibrillation.  Right bundle branch block.  No STEMI                           MDM        -Tracing on cardiac monitor and pulse oximetry was reviewed by myself.   -The cardiac monitor revealed afib as interpreted by me. The cardiac monitor was ordered to monitor the patient for dysrhythmia  -Pulse oximetry was interpreted by me and was normal.  Pulse oximeter was ordered to monitor patient for hypoxia.        -History source other than patient - cardiologist        -Comorbidities did add complexity to the management are mentioned in the HPI above        -I personally reviewed the prior external notes and the medical record to obtain additional history I reviewed patient outpatient notes from [facility] immediate care patient had BNP greater than 6000.,  Chest xray  suggestive of  IMPRESSION:     Cardiomegaly and pleural effusions suggestive of congestive heart failure.         -DDX: Includes but not limited to acute coronary syndrome, acute CHF      Labs Reviewed   CBC WITH DIFFERENTIAL WITH PLATELET   COMP METABOLIC PANEL (14)   TROPONIN I HIGH SENSITIVITY   PROTHROMBIN TIME (PT)   RAINBOW DRAW LAVENDER   RAINBOW DRAW LIGHT GREEN   RAINBOW DRAW BLUE      Patient EKG was performed here did not show STEMI.  She was given IV Lasix and diltiazem IV.  Discussed with [facility] cardiology Dr. Dunn as well as the duly hospitalist patient will be admitted for further care.          Admission disposition: 2/6/2024  5:44 PM                                        Medical Decision Making      Disposition and Plan     Clinical Impression:  1. Atrial fibrillation with rapid ventricular response (HCC)    2. Acute on chronic congestive heart failure, unspecified heart failure type (HCC)         Disposition:  Admit  2/6/2024  5:44 pm    Follow-up:  No follow-up provider specified.        Medications Prescribed:  Current Discharge Medication List                            Hospital Problems       Present on Admission  Date Reviewed: 2/15/2023            ICD-10-CM Noted POA    * (Principal) Atrial fibrillation with rapid ventricular response (HCC) I48.91 2/6/2024 Unknown

## 2024-02-06 NOTE — ED QUICK NOTES
Spoke with patient and family. Discussed having the patient change into a gown, who questioned putting a gown on stating \"everything was done at the Cardiology office\". Charge nurse called to speak with patient and family regarding their care here in the ED.

## 2024-02-06 NOTE — PROGRESS NOTES
Reviewed with Dr. Pelaez    Presented today with acute on chronic HFrEF precipitated by recurrent atrial fibrillation    Being admitted for rate control and diuresis    Recently diagnosed UTI on antibiotics - plan to complete course as prescribed    Labs earlier today:    17/0.8   K+ 4.1    Pro-bnp  6793    Hgb 12.8    Does not require any ER testing - plan for admission to telemetry    BMP and INR in am    Hold ACE in anticipation of transitioning to Entresto

## 2024-02-06 NOTE — ED INITIAL ASSESSMENT (HPI)
To the ED from VENITA Guerra's office with c/o afib RVR. Alberto was told to go to the ED by Cards MD for admission. Not able to direct admit at this time. Patient has hx of afib.

## 2024-02-07 ENCOUNTER — APPOINTMENT (OUTPATIENT)
Dept: GENERAL RADIOLOGY | Facility: HOSPITAL | Age: 79
End: 2024-02-07
Attending: INTERNAL MEDICINE
Payer: MEDICARE

## 2024-02-07 LAB
ANION GAP SERPL CALC-SCNC: 4 MMOL/L (ref 0–18)
BASOPHILS # BLD AUTO: 0.03 X10(3) UL (ref 0–0.2)
BASOPHILS NFR BLD AUTO: 0.4 %
BUN BLD-MCNC: 14 MG/DL (ref 9–23)
CALCIUM BLD-MCNC: 8.5 MG/DL (ref 8.5–10.1)
CHLORIDE SERPL-SCNC: 112 MMOL/L (ref 98–112)
CO2 SERPL-SCNC: 27 MMOL/L (ref 21–32)
CREAT BLD-MCNC: 0.84 MG/DL
EGFRCR SERPLBLD CKD-EPI 2021: 72 ML/MIN/1.73M2 (ref 60–?)
EOSINOPHIL # BLD AUTO: 0.02 X10(3) UL (ref 0–0.7)
EOSINOPHIL NFR BLD AUTO: 0.3 %
ERYTHROCYTE [DISTWIDTH] IN BLOOD BY AUTOMATED COUNT: 13.4 %
GLUCOSE BLD-MCNC: 95 MG/DL (ref 70–99)
HCT VFR BLD AUTO: 37.8 %
HGB BLD-MCNC: 12.7 G/DL
IMM GRANULOCYTES # BLD AUTO: 0.02 X10(3) UL (ref 0–1)
IMM GRANULOCYTES NFR BLD: 0.3 %
INR BLD: 3.32 (ref 0.8–1.2)
LYMPHOCYTES # BLD AUTO: 2.04 X10(3) UL (ref 1–4)
LYMPHOCYTES NFR BLD AUTO: 27.6 %
MCH RBC QN AUTO: 28 PG (ref 26–34)
MCHC RBC AUTO-ENTMCNC: 33.6 G/DL (ref 31–37)
MCV RBC AUTO: 83.4 FL
MONOCYTES # BLD AUTO: 2.37 X10(3) UL (ref 0.1–1)
MONOCYTES NFR BLD AUTO: 32 %
NEUTROPHILS # BLD AUTO: 2.92 X10 (3) UL (ref 1.5–7.7)
NEUTROPHILS # BLD AUTO: 2.92 X10(3) UL (ref 1.5–7.7)
NEUTROPHILS NFR BLD AUTO: 39.4 %
OSMOLALITY SERPL CALC.SUM OF ELEC: 296 MOSM/KG (ref 275–295)
PLATELET # BLD AUTO: 161 10(3)UL (ref 150–450)
POTASSIUM SERPL-SCNC: 3.7 MMOL/L (ref 3.5–5.1)
PROTHROMBIN TIME: 34.3 SECONDS (ref 11.6–14.8)
Q-T INTERVAL: 454 MS
QRS DURATION: 176 MS
QTC CALCULATION (BEZET): 608 MS
R AXIS: 155 DEGREES
RBC # BLD AUTO: 4.53 X10(6)UL
SODIUM SERPL-SCNC: 143 MMOL/L (ref 136–145)
T AXIS: -17 DEGREES
VENTRICULAR RATE: 108 BPM
WBC # BLD AUTO: 7.4 X10(3) UL (ref 4–11)

## 2024-02-07 PROCEDURE — 72100 X-RAY EXAM L-S SPINE 2/3 VWS: CPT | Performed by: INTERNAL MEDICINE

## 2024-02-07 PROCEDURE — 72072 X-RAY EXAM THORAC SPINE 3VWS: CPT | Performed by: INTERNAL MEDICINE

## 2024-02-07 RX ORDER — EPLERENONE 25 MG/1
25 TABLET, FILM COATED ORAL DAILY
Status: DISCONTINUED | OUTPATIENT
Start: 2024-02-07 | End: 2024-02-12

## 2024-02-07 RX ORDER — POTASSIUM CHLORIDE 20 MEQ/1
40 TABLET, EXTENDED RELEASE ORAL ONCE
Status: COMPLETED | OUTPATIENT
Start: 2024-02-07 | End: 2024-02-07

## 2024-02-07 RX ORDER — DILTIAZEM HYDROCHLORIDE 5 MG/ML
10 INJECTION INTRAVENOUS EVERY 2 HOUR PRN
Status: DISCONTINUED | OUTPATIENT
Start: 2024-02-07 | End: 2024-02-12

## 2024-02-07 NOTE — PLAN OF CARE
Patient admitted from ED. Alert and oriented x4. Breathing regular and unlabored. On RA. Afib w/ BBB in tele. Skin check done. Up SBA. Continent of B&B. No pain complained.  at BS. Needs attended promptly. Bed alarm on. Call light w/in reach.      Problem: CARDIOVASCULAR - ADULT  Goal: Maintains optimal cardiac output and hemodynamic stability  Description: INTERVENTIONS:  - Monitor vital signs, rhythm, and trends  - Monitor for bleeding, hypotension and signs of decreased cardiac output  - Evaluate effectiveness of vasoactive medications to optimize hemodynamic stability  - Monitor arterial and/or venous puncture sites for bleeding and/or hematoma  - Assess quality of pulses, skin color and temperature  - Assess for signs of decreased coronary artery perfusion - ex. Angina  - Evaluate fluid balance, assess for edema, trend weights  Outcome: Progressing  Goal: Absence of cardiac arrhythmias or at baseline  Description: INTERVENTIONS:  - Continuous cardiac monitoring, monitor vital signs, obtain 12 lead EKG if indicated  - Evaluate effectiveness of antiarrhythmic and heart rate control medications as ordered  - Initiate emergency measures for life threatening arrhythmias  - Monitor electrolytes and administer replacement therapy as ordered  Outcome: Progressing     Problem: SAFETY ADULT - FALL  Goal: Free from fall injury  Description: INTERVENTIONS:  - Assess pt frequently for physical needs  - Identify cognitive and physical deficits and behaviors that affect risk of falls.  - Beverly fall precautions as indicated by assessment.  - Educate pt/family on patient safety including physical limitations  - Instruct pt to call for assistance with activity based on assessment  - Modify environment to reduce risk of injury  - Provide assistive devices as appropriate  - Consider OT/PT consult to assist with strengthening/mobility  - Encourage toileting schedule  Outcome: Progressing     Problem: RESPIRATORY -  ADULT  Goal: Achieves optimal ventilation and oxygenation  Description: INTERVENTIONS:  - Assess for changes in respiratory status  - Assess for changes in mentation and behavior  - Position to facilitate oxygenation and minimize respiratory effort  - Oxygen supplementation based on oxygen saturation or ABGs  - Provide Smoking Cessation handout, if applicable  - Encourage broncho-pulmonary hygiene including cough, deep breathe, Incentive Spirometry  - Assess the need for suctioning and perform as needed  - Assess and instruct to report SOB or any respiratory difficulty  - Respiratory Therapy support as indicated  - Manage/alleviate anxiety  - Monitor for signs/symptoms of CO2 retention  Outcome: Progressing

## 2024-02-07 NOTE — OCCUPATIONAL THERAPY NOTE
Attempted to see pt this aM, pt with X-Rays pending for spine 2/2 fall, OT will follow up as approp, RN aware.

## 2024-02-07 NOTE — ED QUICK NOTES
Orders for admission, patient is aware of plan and ready to go upstairs. Any questions, please call ED RN Luis at extension [phone].     Patient Covid vaccination status: Fully vaccinated     COVID Test Ordered in ED: None    COVID Suspicion at Admission: N/A    Running Infusions:  None    Mental Status/LOC at time of transport: A/O X4    Other pertinent information:   CIWA score: N/A   NIH score:  N/A

## 2024-02-07 NOTE — PHYSICAL THERAPY NOTE
Order received for PT eval and chart reviewed. XRAYs of L/S and T/S pending s/p fall. PT will continue to follow and initiate therapy, as appropriate.     Followed up this afternoon. T/S XRAY revealing for possible acute T8 compression fracture.   Hospitalist recommends to await orthospine/neurosurgery consult. PT will continue to follow.

## 2024-02-07 NOTE — PLAN OF CARE
Spoke with Dr Mcgarry for consult,he said it's going to be Dr Bonilla for this kind of consult  Dr Irene hyatt serve waiting for response.

## 2024-02-07 NOTE — PROGRESS NOTES
[facility] Dosing [facility]  Warfarin (Coumadin) Initial Dosing    Angeles Anand is a 78 year old patient for whom pharmacy has been consulted to dose warfarin (COUMADIN) for Prevention of systemic embolism by Dr. Freeman.  Based on this indication, goal INR is 2-3.    Pertinent Patient Medical History:  Afib, HF  Potential Drug Interactions:  amiodarone    Latest INR:   Recent Labs     02/06/24  1818   INR 3.55*       Other Anticoagulants:  none  Home regimen (if applicable):  warfarin 2.5 mg nightly   Date/Time last dose was given (if applicable): 2/4    Based on above -  1.  For today, Hold warfarin (COUMADIN) dose. INR supratherapeutic.    2.  PT/INR ordered daily while on warfarin    3.  Pharmacy will continue to follow.  We appreciate the opportunity to assist in the care of this patient.    Olga Kerr, PharmD  2/6/2024  10:14 PM

## 2024-02-07 NOTE — CONSULTS
Facility Logo [facility]  [address] [place] [address], 4th floor, [place] [address]  [phone]      Angeles Anand  Progress Note  Demographics:  Name: Angeles Anand YOB: 1945  Age: 78, Female Medical Record No: [id number]  Visited Date/Time: 02/06/2024 03:45 PM    Chief Complaints  LE swelling  History of Present Illness  Unscheduled visit.  I just saw her last Tuesday in follow-up from her hospitalization.  She was doing well and in sinus rhythm.    Sunday she fell back hitting her head after getting laundry out of the dryer.  She was seen in the emergency room because of a laceration.  This did not require stitches.    She has been having upper neck and back discomfort since that time.  She was also diagnosed with a urinary tract infection.    She has not been feeling well.  She saw her primary care physician and had blood work performed as well as an x-ray.  She was sent over to our office.    She looks exhausted and feels exhausted.  Her color is poor.  She is back in atrial fibrillation.  Heart rates are accelerated.  She has mild edema.  No wheezing  Cardiac risk factors Hypertension, Dyslipidemia and Former smoker  Past Medical History  1.Atrial fibrillation (Afib), paroxysmal - A Fib  2.Atrial Flutter, unspecified  3.CAD (coronary artery disease)  4.Aneurysm, thoracic aortic  5.Hx of CABG  6.Essential hypertension  7.Pure hypercholesterolemia  Family History  No significant family history noted.  Social History  Smoking status Former smoker  Tobacco usage - No (Non-smoker (finding))  Review of systems  Constitutional Fatigue  No history of Weight Loss  Eyes No history of Double vision  ENT No history of Bloody nose (epistaxis)  Gastrointestinal No history of Abdominal pain  Cardiovascular DALLAS and Edema  Genitourinary UTI  Musculoskeletal Muscle weakness and Arthritis  Respiratory No history of Hemoptysis  Neurological Limb weakness  Endocrine No history of Orthostatic  symptoms  Psychiatric No history of Drug abuse  Hem/Lymphatic No history of Blood clots  Allergy Immunology No history of Hives  Integumentary No history of Rashes  Physical Examination  Vitals Right Arm Sitting  / 66 mmHg, Pulse rate 112 bpm, Height in 5' 4\", BMI: 21.3, Weight in 124 lbs (or) 56 kgs and BSA : 1.59 cc/m²  General Appearance No Acute Distress  Head/Eyes/Ears/Nose/Mouth/Throat No icterus  Neck Normal carotid pulsations  Respiratory Lungs clear with normal breath sounds  Cardiovascular irreg and  Gastrointestinal Abdomen soft  Lower Extremities No edema  Skin Warm and dry  Neurologic / Psychiatric Non-focal  Allergies  1.Iodine and/or iodine compound (substance) [Snomed:[id number]](Reaction:Hives [Snomed:[id number]], Severity:Moderate)  2.Kiwi(Reaction:, Severity:Mild)  3.Kiwi (Food Or Med)(Reaction:, Severity:Mild)  Medications  1.amiodarone 200 mg tablet, Take 1 tablet orally 2 times a day for 7 days the 1 tab daily  2.amoxicillin (AMOXIL) 500 MG tablet, 500 mg. For dental procedures  3.anastrozole (ARIMIDEX) 1 MG tablet, TAKE ONE TABLET BY MOUTH DAILY  4.betamethasone valerate (VALISONE) 0.1 % cream, Apply to AA's of legs BID x 1-2 weeks, hold at least one week, then PRN for flares.  5.Calcium 600-200 MG-UNIT Tab, Take 600 mg by mouth.  6.cyanocobalamin (VITAMIN B-12) 500 MCG tablet, Take 500 mcg by mouth.  7.metoprolol succinate ER 50 mg tablet,extended release 24 hr, Take 1 tablet orally 2 times a day.  8.mometasone (ELOCON) 0.1 % cream, Apply to AAs of face BID x1 week. Hold x1 week. Repeat PRN with flares.  9.Ramipril 5 Mg Cap Zydu, TAKE ONE CAPSULE BY MOUTH DAILY  10.Simvastatin 40 Mg Tab Nort, TAKE 1 TABLET BY MOUTH EVERY NIGHT AT BEDTIME  11.triamcinolone 0.025 % lotion, Apply topically as needed.  12.Tylenol PM Extra Strength 25 mg-500 mg tablet, Take 1 tablet orally once a day as needed.  13.warfarin 2.5 mg tablet, Take 1 tablet orally once in the evening as instructed by warfarin  [facility] [phone]  Impression  1.Monitoring for long-term anticoagulant use  2.Atrial fibrillation (Afib), paroxysmal - A Fib  3.CAD (coronary artery disease)  4.Aneurysm, thoracic aortic  5.Hx of CABG  6.Essential hypertension  7.Pure hypercholesterolemia  Assessment & Plan  Acute on chronic heart failure with reduced ejection fraction.  Complex prior heart surgery.  Decompensation appears to be precipitated by atrial fibrillation.    She needs to be admitted to the hospital.  She needs rate control and diuresis.    I will ask my electrophysiology colleagues to see her to help me with our rhythm control strategy.    I had been planning to see her in March and if recurrent atrial fibrillation was present consider an ablation.  We will obviously need to address sooner.    We may need to consider major adjustments in her medical regimen aimed at improving her LV function.  She has been well compensated for years.    I reviewed this with she and her .  I think they have a good understanding and seem comfortable with that approach  Labs and Diagnostics ordered  1.EKG (electrocardiogram) (Today)  Lab Details  INR  02/04/2024 12:00:00 AM  INR 3.45 2-3 RATIO H F  POCT COAGUCHECK  01/29/2024 09:34:10 AM  POC INR 2.90 0.90-1.10 H F  Diagnostics Details  Trans Thoracic Echocardiogram 08/30/2023  1.The left ventricle is mildly enlarged. Wall thickness is normal. Global left ventricular systolic function is moderately decreased. The left ventricular ejection fraction is 35-40%. Lumason was used to enhance visualization of endocardial borders.    2.A SAVR has been performed and the valve is functioning normally. The trans-aortic peak velocity is 1.8 m/s. The trans-aortic mean gradient is 7.0 mmHg. Trace AI.    CPOE Orders carried out by: Yonny  Care Providers: Ritesh Guerra MD, Yudy John Castaldo and Elizabeth  Electronically Authenticated by  Ritesh Guerra MD  02/06/2024 04:32:59 PM  Disclaimer:  Components of this note were documented using voice recognition system and are subject to errors not corrected at proofreading. Contact the author of this note for any clarifications.    Addendum:    Reviewed with ER staff - unable to admit directly due to bed availability. Will give IV cardizem and IV lasix in the ER  and admit to tele

## 2024-02-07 NOTE — OCCUPATIONAL THERAPY NOTE
Followed up this afternoon. T/S XRAY revealing for possible acute T8 compression fracture.   Hospitalist recommends to await orthospine/neurosurgery consult. OT will continue to follow as approp.

## 2024-02-07 NOTE — PROGRESS NOTES
02/06/24 2029 02/06/24 2030 02/06/24 2032   Vital Signs   /86 (!) 140/95 (!) 140/100   MAP (mmHg) 96 (!) 106 (!) 106   BP Location Right arm  (right forearm) Right arm  (forearm) Right arm  (right forearm)   BP Method Automatic Automatic Manual   Patient Position Lying Sitting Standing

## 2024-02-07 NOTE — H&P
JORD[facility]  HOSPITALIST  History and Physical     Angeles Anand Patient Status:  Emergency    3/6/1945 MRN [id number]   [facility] EMERGENCY DEPARTMENT Attending Tiffany Pelaez MD   Hosp Day # 0 PCP Charlene Huang MD     Chief Complaint:   Ble swelling, sob, afib    History of Present Illness: Angeles Anand is a 78 year old female with PMH sig for paroxysmal A-fib, a flutter, CAD, history of CABG, hypertension, hyperlipidemia, SAVR, chronic thrombocytopenia, leukopenia 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 Pt went to see PCP and was noted to have elev HR swollen feet and sob. She went to see her cardiologist and there was susp for acute chf precipitate by afib so she was sent to the ED. In ED O2 sats were above 94, pt states she is not on any diuresis, noted BLE swelling, c/o back pain. NO chest pain. No fever chills no nausea vomting. In ED routine labs showed normal BMP, normal CBC, INR 3.55. trop normal, pBNP >6K. Of note - pt said she fell 2 days ago while taking clothes out of the washing machine and hit the back of her head and her back and has since had mid to lower back pain.  She had an echo in 2023 that showed an EF of 35 to 40%   She had an ECHO 24 which showed reduced systolic function, EF 25-30%  Patient was admitted from 24/ to 24.      Past Medical History:  Past Medical History:   Diagnosis Date    Arrhythmia     ATRIAL FIBRILLATION     Dr. Guerra    Atrial flutter (HCC)     Breast cancer (HCC)     INVASIVE DUCTAL    CANCER 10/96    breast CIS- ?ductal     Cancer (HCC)     CORONARY ARTERY DISEASE     Endocrine disorder     Gout     History of blood transfusion     Hypertension     Lymphedema of upper extremity following lymphadenectomy 2014    Migraines     Osteopenia     Other and unspecified hyperlipidemia     Unspecified essential hypertension     Visual impairment          Past Surgical History:   Past Surgical History:   Procedure Laterality Date    ANGIOGRAM      APPENDECTOMY      APPENDECTOMY      CABG  2004    HYSTERECTOMY  1997    MIKE/BSO- fibroid uterus    LUMPECTOMY LEFT  Jan. 2013    invasive ductal    COLBY LOCALIZATION WIRE 1 SITE LEFT (CPT=19281)  1998;1996    NEEDLE BIOPSY LEFT  2012    us core bx-papilloma    NEEDLE BIOPSY RIGHT  2/2013    mri bx-sclerosing adenosis    OOPHORECTOMY  1997    OTHER  2015    thoracic aortic aneurysm repair    OTHER SURGICAL HISTORY  2004    Surgery for aortic dissection- aortic root repair, aortic valve replacement, single CABG    OTHER SURGICAL HISTORY       left breast biopsy in 1996 and 1998    OTHER SURGICAL HISTORY  3/1/2013    re-excision left lumpectomy    RADIATION LEFT  5/2013       Social History:  reports that she quit smoking about 36 years ago. Her smoking use included cigarettes. She has never used smokeless tobacco. She reports current alcohol use of about 4.0 standard drinks of alcohol per week. She reports that she does not use drugs.    Family History:   Family History   Problem Relation Age of Onset    Heart Disorder Father     Diabetes Mother     Breast Cancer Self 68        Allergies:   Allergies   Allergen Reactions    Gadolinium     Radiology Contrast Iodinated Dyes     Kiwi Extract SWELLING       Medications:    Current Facility-Administered Medications on File Prior to Encounter   Medication Dose Route Frequency Provider Last Rate Last Admin    [COMPLETED] lidocaine-epinephrine-tetracaine (LET) 1:1000-0.5 % topical solution 3 mL  3 mL Topical Once Salomón Eli MD   3 mL at 02/04/24 1639    [COMPLETED] cephalexin (Keflex) cap 500 mg  500 mg Oral Once Salomón Eli MD   500 mg at 02/04/24 1843    [COMPLETED] digoxin (Lanoxin) 250 MCG/ML injection 250 mcg  250 mcg Intravenous Once Ritesh Guerra MD   250 mcg at 01/22/24 1200    [COMPLETED] Perflutren Lipid Microsphere (DEFINITY) 6.52 MG/ML injection 1.5 mL   1.5 mL Intravenous ONCE PRN AmNathan singh MD   1.5 mL at 01/22/24 1125    [COMPLETED] warfarin (Coumadin) tab 1 mg  1 mg Oral Once at night AmNathan singh MD   1 mg at 01/22/24 2151    [COMPLETED] metoprolol (Lopressor) 5 mg/5mL injection 2.5 mg  2.5 mg Intravenous Once Bia Garcia MD   2.5 mg at 01/21/24 0818    [COMPLETED] potassium chloride (K-Dur) tab 40 mEq  40 mEq Oral Once Anurag Rojo MD   40 mEq at 01/21/24 0958    [COMPLETED] furosemide (Lasix) 10 mg/mL injection 40 mg  40 mg Intravenous Once AmatNathan murphy MD   40 mg at 01/21/24 1600    [COMPLETED] warfarin (Coumadin) tab 2 mg  2 mg Oral Once at night AmatNathan murphy MD   2 mg at 01/21/24 2117     Current Outpatient Medications on File Prior to Encounter   Medication Sig Dispense Refill    cephalexin 500 MG Oral Cap Take 1 capsule (500 mg total) by mouth 4 (four) times daily for 7 days. 28 capsule 0    metoprolol succinate ER 50 MG Oral Tablet 24 Hr Take 1 tablet (50 mg total) by mouth 2x Daily(Beta Blocker). 60 tablet 11    amiodarone 200 MG Oral Tab 1 tab po bid x 7 days, then 1 tablet daily there after 40 tablet 3    anastrozole 1 MG Oral Tab tab Take 1 tablet (1 mg total) by mouth daily. 90 tablet 3    warfarin 2.5 MG Oral Tab Take 1 tablet (2.5 mg total) by mouth nightly. Take as directed by the warfarin clinic      Calcium-Vitamin D (CALTRATE 600 PLUS-VIT D OR) Take 600 mg by mouth 2 (two) times daily.        ramipril (ALTACE) 5 MG Oral Cap Take 1 capsule (5 mg total) by mouth daily. 30 capsule 11    simvastatin 40 MG Oral Tab Take 1 tablet (40 mg total) by mouth nightly.         Review of Systems:   A comprehensive 14 point review of systems was completed.    Pertinent positives and negatives noted in the HPI.    Physical Exam:    /88   Pulse 99   Temp 98.5 °F (36.9 °C) (Temporal)   Resp 18   SpO2 92%   General: No acute distress. Alert and oriented x 3.  HEENT: Normocephalic atraumatic. Moist mucous membranes. EOM-I.  PERRLA. Anicteric.  Neck: No lymphadenopathy. No JVD. No carotid bruits.  Respiratory: Clear to auscultation bilaterally. No wheezes. No rhonchi.  Cardiovascular: S1, S2. Regular rate and rhythm. No murmurs, rubs or gallops. Equal pulses.   Chest and Back: No tenderness or deformity.  Abdomen: Soft, nontender, nondistended.  Positive bowel sounds. No rebound, guarding or organomegaly.  Neurologic: No focal neurological deficits. CNII-XII grossly intact.  Musculoskeletal: Moves all extremities.  Extremities: No edema or cyanosis.  Integument: No rashes or lesions.   Psychiatric: Appropriate mood and affect.      Diagnostic Data:      Labs:  Recent Labs   Lab 02/04/24  1627 02/06/24  1818   WBC 5.1 6.6   HGB 12.3 13.0   MCV 90.3 87.9   .0 174.0   INR 3.45* 3.55*       Recent Labs   Lab 02/04/24  1627 02/06/24  1818   * 116*   BUN 24* 19   CREATSERUM 1.06* 0.88   CA 8.6 9.1   ALB  --  3.6    139   K 3.8 4.1   * 108   CO2 26.0 26.0   ALKPHO  --  77   AST  --  36   ALT  --  49   BILT  --  0.8   TP  --  7.1       Estimated Creatinine Clearance: 46.2 mL/min (based on SCr of 0.88 mg/dL).    Recent Labs   Lab 02/04/24 1627 02/06/24 1818   PTP 35.3* 36.1*   INR 3.45* 3.55*       COVID-19 Lab Results    COVID-19  Lab Results   Component Value Date    COVID19 Not Detected 01/21/2024       Pro-Calcitonin  No results for input(s): \"PCT\" in the last 168 hours.    Cardiac  No results for input(s): \"TROP\", \"PBNP\" in the last 168 hours.    Creatinine Kinase  No results for input(s): \"CK\" in the last 168 hours.    Inflammatory Markers  No results for input(s): \"CRP\", \"GUSTAVO\", \"LDH\", \"DDIMER\" in the last 168 hours.    Recent Labs   Lab 02/06/24  1818   TROPHS 16       Imaging: Imaging data reviewed in Epic.    ECHO 1/22/24  Conclusions:     1. Left ventricle: The cavity size was increased. Wall thickness was normal.      Systolic function was reduced. The estimated ejection fraction was      25-30%, by visual  assessment. Unable to assess LV diastolic function due      to heart rhythm.   2. Left atrium: The left atrial volume was moderately increased.   3. Right atrium: The atrium was mildly dilated.   4. Aortic valve: A bioprosthetic valve is present. There was mild      regurgitation. The peak systolic velocity was 2.04m/sec. The mean      systolic gradient was 8mm Hg. The valve area (VTI) was 1.65cm^2. The      valve area (VTI) index was 1.05cm^2/m^2.   5. Mitral valve: There was mild regurgitation.   6. Pulmonary arteries: Systolic pressure was at the upper limits of normal,      estimated to be 32mm Hg. The peak systolic pressure is 32mm Hg.   Impressions:  This study is compared with previous dated 05-29-19: Unable to   compare images side to side.   LVEF may be similar or somewhat lower than reported prior.   *   ASSESSMENT / PLAN:   Angeles Anand is a 78 year old female with PMH sig for paroxysmal A-fib, a flutter, CAD, history of CABG, hypertension, hyperlipidemia, SAVR, chronic thrombocytopenia, leukopenia 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    BLE swelling, sob 2/2 acute on chronic HFrEF  Elevated pBNP  -monitor on tele  -daily wts  -strict I/os  -ECHO reviewed from 1/22/24   -diuresis per cards  -cardiology consulted >> apprec recs  -hold ace - in anticipation of transitioning to entresto    Back pain - s/p fall  -prn pain control  -will get back xray T and L spine  -PT/OT    Afib with RVR  Coagulapathy  Supratherapeutic INR  -inr 3.5 >> warfarin on hold (pharm to dose)  -monitor on tele  -cont BB, amio    CAD sp CABG w/ SACR  -cards following  -ECHO reviewed    HTN  -hold ace   -transition to entresto  -BB    Hypothyroidism  -synthroid  -check TSH    Quality:  DVT Prophylaxis: warfarin, scds  CODE status: full code  Shrestha: no  If COVID testing is negative, may discontinue isolation: yes     Plan of care discussed with patient  and wife, all questions answered.        Elizabeth Freeman MD  [facility]  Pager [phone]  Answering Service number: [phone]

## 2024-02-07 NOTE — PLAN OF CARE
1148 Pt converted to SB HR 55  Baylee RIVERA made aware with orders  Amiodarone gtt decrease to 0.5mg/hr at 16.7 ml/hr  12 lead EKG done at   Will monitor

## 2024-02-07 NOTE — ED QUICK NOTES
PT offered and refused brianck.  Provided teaching to the patient and the family regarding ambulating and fall prevention due to diltiazem and lasix given.  PT family member states \"we will walk her over\" (to the bathroom).

## 2024-02-07 NOTE — PROGRESS NOTES
Feels fair - didn't sleep well - states she urinated frequently after lasix yet recorded output low    Tele - atrial fibrillation persists    Afebrile  121/78  113    Lungs clear  Ht irregular - soft PANCHITO  Abd soft  Ext no significant edema  Neuro intact    14/0.8     INR 3.3     K+ 3.7      A/P: Acute of chronic HFrEF - precipitated by PAF    IV amiodarone    Diuretics    Adjust medications aimed at LV dysfunction    Potential cardioversion if fails to convert - will eventually need consideration for ablation    Reviewed with patient and her         L3

## 2024-02-07 NOTE — PROGRESS NOTES
[facility] Hospitalist Progress Note     Angeles Anand Patient Status:  Inpatient    3/6/1945 MRN [id number]   Location [facility] 8NE-A Attending Elizabeth Freeman MD   Hosp Day # 1 PCP Charlene Huang MD     Chief Complaint:   Fu Ble swelling, sob, afib    Subjective:     Patient seen and examined.   Still havign back pain  No other major complaints  Afebrile    Objective:    Review of Systems:   10 point ROS completed and was negative, except for pertinent positive and negatives stated in subjective.    Vital signs:  Temp:  [98.1 °F (36.7 °C)-98.5 °F (36.9 °C)] 98.2 °F (36.8 °C)  Pulse:  [] 113  Resp:  [15-19] 15  BP: (111-140)/() 121/78  SpO2:  [92 %-98 %] 97 %    Physical Exam:    General: No acute distress.   HEENT:  EOMI, PERRLA, OP clear  Respiratory: Clear to auscultation bilaterally. No wheezes. No rhonchi.  Cardiovascular: S1, S2. Regular rate and rhythm. No murmurs.  Abdomen: Soft, nontender, nondistended.  Positive bowel sounds. No rebound or guarding.  Extremities: No edema.  Neuro:  Grossly non focal, no motor deficits.        Diagnostic Data:    Labs:  Recent Labs   Lab 24  0536 24  0537   WBC 5.1 6.6 7.4  --    HGB 12.3 13.0 12.7  --    MCV 90.3 87.9 83.4  --    .0 174.0 161.0  --    INR 3.45* 3.55*  --  3.32*       Recent Labs   Lab 24  16224  1818 24  0537   * 116* 95   BUN 24* 19 14   CREATSERUM 1.06* 0.88 0.84   CA 8.6 9.1 8.5   ALB  --  3.6  --     139 143   K 3.8 4.1 3.7   * 108 112   CO2 26.0 26.0 27.0   ALKPHO  --  77  --    AST  --  36  --    ALT  --  49  --    BILT  --  0.8  --    TP  --  7.1  --        Estimated Creatinine Clearance: 48.4 mL/min (based on SCr of 0.84 mg/dL).    Recent Labs   Lab 24  1627 24  1818 24  0537   PTP 35.3* 36.1* 34.3*   INR 3.45* 3.55* 3.32*            COVID-19 Lab Results    COVID-19  Lab Results    Component Value Date    COVID19 Not Detected 01/21/2024       Pro-Calcitonin  No results for input(s): \"PCT\" in the last 168 hours.    Cardiac  No results for input(s): \"TROP\", \"PBNP\" in the last 168 hours.    Creatinine Kinase  No results for input(s): \"CK\" in the last 168 hours.    Inflammatory Markers  No results for input(s): \"CRP\", \"GUSTAVO\", \"LDH\", \"DDIMER\" in the last 168 hours.    Imaging: Imaging data reviewed in Epic.    Medications:    amiodarone  150 mg Intravenous Once    eplerenone  25 mg Oral Daily    furosemide  40 mg Intravenous BID (Diuretic)    anastrozole  1 mg Oral Daily    metoprolol succinate ER  50 mg Oral 2x Daily(Beta Blocker)    [Held by provider] ramipril  5 mg Oral Daily    atorvastatin  40 mg Oral Nightly       Assessment & Plan:    Angeles Anand is a 78 year old female with PMH sig for paroxysmal A-fib, a flutter, CAD, history of CABG, hypertension, hyperlipidemia, SAVR, chronic thrombocytopenia, leukopenia 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     BLE swelling, sob 2/2 acute on chronic HFrEF  Elevated pBNP  -monitor on tele  -daily wts  -strict I/os  -ECHO reviewed from 1/22/24   -diuresis per cards  -cardiology consulted >> apprec recs  -hold ace - in anticipation of transitioning to entresto     Back pain - s/p fall  -prn pain control  -xray T and L spine pending  -PT/OT     Afib with RVR  Coagulapathy  Supratherapeutic INR  -inr 3.5 >> warfarin on hold (pharm to dose)  -monitor on tele  -cont BB, amio     CAD sp CABG w/ SACR  -cards following  -ECHO reviewed     HTN  -hold ace   -transition to entresto  -BB     Hypothyroidism  -synthroid  -check TSH     Quality:  DVT Prophylaxis: warfarin, scds  CODE status: full code  Shrestha: no  If COVID testing is negative, may discontinue isolation: yes      Plan of care discussed with patient and wife, all questions answered.      DISPO  Cont inpt     Elizabeth Freeman  MD [facility] Hospitalist  Pager [phone]  Answering Service number: [phone]            **Certification      PHYSICIAN Certification of Need for Inpatient Hospitalization - Initial Certification    Patient will require inpatient services that will reasonably be expected to span two midnight's based on the clinical documentation in H+P.   Based on patients current state of illness, I anticipate that, after discharge, patient will require TBD.

## 2024-02-07 NOTE — PROGRESS NOTES
[facility]  Warfarin (Coumadin) Subsequent Dosing    Angeles Anand is a 78 year old patient for whom pharmacy is dosing warfarin (Coumadin). Goal INR is 2-3    Recent Labs   Lab 02/04/24  1627 02/06/24  1818 02/07/24  0537   INR 3.45* 3.55* 3.32*       Consulted by:  Dr Freeman  Indication:  Afib  Potential Drug Interactions:  amiodarone, on PTA now started on amiodarone drip  Other Anticoagulants:  none  Home regimen (if applicable):  warfarin 2.5 mg qhs    Inpatient Dosing History:    Date 2/6 2/7       INR 3.55 3.32       Coumadin dose held Will hold                Based on above -  1.  For today, Hold warfarin (COUMADIN) dose  2   PT/INR ordered daily while on warfarin  3.  Pharmacy will continue to follow.  We appreciate the opportunity to assist in the care of this patient.    Christiane Ramos PharmD  2/7/2024  8:22 AM

## 2024-02-07 NOTE — CM/SW NOTE
02/07/24 1400   CM/SW Referral Data   Referral Source Social Work (self-referral)   Reason for Referral Discharge planning   Informant Patient;Spouse/Significant Other   Medical Hx   Does patient have an established PCP? Yes   Patient Info   Patient's Home Environment Condo/Apt no elevator   Number of Levels in Home 1   Number of Stair in Home 15   Patient lives with Spouse/Significant other   Patient Status Prior to Admission   Independent with ADLs and Mobility Yes   Discharge Needs   Anticipated D/C needs To be determined     2/7:    Patient is a 78 year old female who was admitted for A-fib. The patient presents as alert and oriented x4. Patient resides at  home with .  The apartment complex has 15 stairs to get to residence. The  mentioned that he is the one who drives wife around to appointments and to  meds. Asked about HHC, patient and  stated that there was no need , that there is little to do at home and that the  is capable of taking care of things around the house.    The patient doesn't use any medical devices. The PCP is Charlene Huang

## 2024-02-08 ENCOUNTER — APPOINTMENT (OUTPATIENT)
Dept: GENERAL RADIOLOGY | Facility: HOSPITAL | Age: 79
End: 2024-02-08
Attending: INTERNAL MEDICINE
Payer: MEDICARE

## 2024-02-08 ENCOUNTER — APPOINTMENT (OUTPATIENT)
Dept: MRI IMAGING | Facility: HOSPITAL | Age: 79
End: 2024-02-08
Payer: MEDICARE

## 2024-02-08 LAB
ANION GAP SERPL CALC-SCNC: 8 MMOL/L (ref 0–18)
ATRIAL RATE: 55 BPM
ATRIAL RATE: 59 BPM
BUN BLD-MCNC: 18 MG/DL (ref 9–23)
CALCIUM BLD-MCNC: 9 MG/DL (ref 8.5–10.1)
CHLORIDE SERPL-SCNC: 106 MMOL/L (ref 98–112)
CO2 SERPL-SCNC: 25 MMOL/L (ref 21–32)
CREAT BLD-MCNC: 0.96 MG/DL
EGFRCR SERPLBLD CKD-EPI 2021: 61 ML/MIN/1.73M2 (ref 60–?)
GLUCOSE BLD-MCNC: 135 MG/DL (ref 70–99)
INR BLD: 4.32 (ref 0.8–1.2)
OSMOLALITY SERPL CALC.SUM OF ELEC: 292 MOSM/KG (ref 275–295)
P AXIS: 27 DEGREES
P AXIS: 7 DEGREES
P-R INTERVAL: 190 MS
P-R INTERVAL: 216 MS
POTASSIUM SERPL-SCNC: 3.4 MMOL/L (ref 3.5–5.1)
POTASSIUM SERPL-SCNC: 3.4 MMOL/L (ref 3.5–5.1)
POTASSIUM SERPL-SCNC: 5 MMOL/L (ref 3.5–5.1)
PROTHROMBIN TIME: 42.2 SECONDS (ref 11.6–14.8)
Q-T INTERVAL: 534 MS
Q-T INTERVAL: 538 MS
QRS DURATION: 172 MS
QRS DURATION: 180 MS
QTC CALCULATION (BEZET): 510 MS
QTC CALCULATION (BEZET): 532 MS
R AXIS: 157 DEGREES
R AXIS: 168 DEGREES
SODIUM SERPL-SCNC: 139 MMOL/L (ref 136–145)
T AXIS: -20 DEGREES
T AXIS: -71 DEGREES
VENTRICULAR RATE: 55 BPM
VENTRICULAR RATE: 59 BPM

## 2024-02-08 PROCEDURE — 99221 1ST HOSP IP/OBS SF/LOW 40: CPT | Performed by: NEUROLOGICAL SURGERY

## 2024-02-08 PROCEDURE — 72146 MRI CHEST SPINE W/O DYE: CPT

## 2024-02-08 PROCEDURE — 99221 1ST HOSP IP/OBS SF/LOW 40: CPT

## 2024-02-08 PROCEDURE — 74019 RADEX ABDOMEN 2 VIEWS: CPT | Performed by: INTERNAL MEDICINE

## 2024-02-08 RX ORDER — AMIODARONE HYDROCHLORIDE 200 MG/1
200 TABLET ORAL DAILY
Status: DISCONTINUED | OUTPATIENT
Start: 2024-02-08 | End: 2024-02-09

## 2024-02-08 RX ORDER — POTASSIUM CHLORIDE 20 MEQ/1
40 TABLET, EXTENDED RELEASE ORAL EVERY 4 HOURS
Status: COMPLETED | OUTPATIENT
Start: 2024-02-08 | End: 2024-02-08

## 2024-02-08 RX ORDER — MORPHINE SULFATE 2 MG/ML
2 INJECTION, SOLUTION INTRAMUSCULAR; INTRAVENOUS EVERY 2 HOUR PRN
Status: DISCONTINUED | OUTPATIENT
Start: 2024-02-08 | End: 2024-02-12

## 2024-02-08 RX ORDER — MORPHINE SULFATE 2 MG/ML
1 INJECTION, SOLUTION INTRAMUSCULAR; INTRAVENOUS EVERY 2 HOUR PRN
Status: DISCONTINUED | OUTPATIENT
Start: 2024-02-08 | End: 2024-02-12

## 2024-02-08 RX ORDER — AMIODARONE HYDROCHLORIDE 200 MG/1
200 TABLET ORAL 2 TIMES DAILY WITH MEALS
Status: DISCONTINUED | OUTPATIENT
Start: 2024-02-08 | End: 2024-02-08

## 2024-02-08 RX ORDER — AMIODARONE HYDROCHLORIDE 200 MG/1
200 TABLET ORAL DAILY
Status: DISCONTINUED | OUTPATIENT
Start: 2024-02-09 | End: 2024-02-08

## 2024-02-08 RX ORDER — LORAZEPAM 2 MG/ML
1 INJECTION INTRAMUSCULAR ONCE
Status: COMPLETED | OUTPATIENT
Start: 2024-02-08 | End: 2024-02-08

## 2024-02-08 RX ORDER — MORPHINE SULFATE 4 MG/ML
4 INJECTION, SOLUTION INTRAMUSCULAR; INTRAVENOUS EVERY 2 HOUR PRN
Status: DISCONTINUED | OUTPATIENT
Start: 2024-02-08 | End: 2024-02-12

## 2024-02-08 NOTE — PROGRESS NOTES
Nausea and vomiting overnight    IV amiodarone now off - looks exhausted - no abdominal pain      Afebrile  142/80  58 regular - converted to sinus yesterday just before noon    Lungs clear  Ht RR - soft PANCHITO  Abd soft - normoactive bowel sounds  Ext no edema  Neuro intact    K+ 3.4    18/1.0    No effective diuresis by recorded I/O's    Probable T8 compression fracture        A/P: Acute on chronic HFrEF - precipitated by PAF - hopefully nausea/vomiting related to amiodarone    Resume amiodarone orally tomorrow    Entresto    Hold loop diuretics today    BMP in am    Potentially home tomorrow pending course    Ortho to assess spine    INR elevated - hold warfarin this evening      Reviewed with patient and her         L3

## 2024-02-08 NOTE — PLAN OF CARE
PDMP reviewed.    Prescription signed and sent electronically to the patient's pharmacy.          RN called with concern of HR 55-60 (Sinus) overnight and vomiting associated with IV amiodarone. Patient converted to SR yesterday afternoon. Discontinued IV amiodarone and changed to amiodarone oral 200 mg PO BID. Hold BB until evaluated this am .   6:58 AM

## 2024-02-08 NOTE — OCCUPATIONAL THERAPY NOTE
OCCUPATIONAL THERAPY EVALUATION - INPATIENT     Room Number: 8609/8609-A  Evaluation Date: 2/8/2024  Type of Evaluation: Initial  Presenting Problem: CGF, T8 compression    Physician Order: IP Consult to Occupational Therapy  Reason for Therapy: ADL/IADL Dysfunction and Discharge Planning    OCCUPATIONAL THERAPY ASSESSMENT   Patient is currently functioning near baseline with toileting, upper body dressing, lower body dressing, grooming, bed mobility, transfers, stating sitting balance, dynamic sitting balance, static standing balance, and dynamic standing balance. Prior to admission, patient's baseline is Mod I for all ADLs and IADLs.  Patient is requiring contact guard assist as a result of the following impairments: decreased endurance, difficulty maintaining precautions, and knowledge os use of TLSO . Occupational Therapy will continue to follow for duration of hospitalization.    Patient will benefit from continued skilled OT Services at discharge to promote functional independence in home.  Anticipate patient will return home with home health OT      History Related to Current Admission: Patient is a 78 year old female admitted on 2/6/2024 with Presenting Problem: CGF, T8 compression. Co-Morbidities : Afib, CAD, lymphedema, gout    WEIGHT BEARING RESTRICTION  Weight Bearing Restriction: None                Recommendations for nursing staff:   Transfers: CGA  Toileting location: toilet    EVALUATION SESSION:  Patient Start of Session: supine in bed for session  FUNCTIONAL TRANSFER ASSESSMENT  Sit to Stand: Edge of Bed  Edge of Bed: Contact Guard Assist    BED MOBILITY  Rolling: Contact Guard Assist  Supine to Sit : Contact Guard Assist  Sit to Supine (OT): Contact Guard Assist  Scooting: CGA to EOB    BALANCE ASSESSMENT  Static Sitting: Contact Guard Assist  Sitting Bilateral: Contact Guard Assist  Static Standing: Contact Guard Assist  Standing Bilateral: Contact Guard Assist (to amb in room and hallway with  rest break 2/2 fatigue)    FUNCTIONAL ADL ASSESSMENT  LB Dressing Seated: Contact Guard Assist (to jaky socks in figure 4 sit)  Toileting Seated: Not Tested (pt declined to perform)      ACTIVITY TOLERANCE: vitals stable                         O2 SATURATIONS       COGNITION  Overall Cognitive Status:  WFL - within functional limits    Upper Extremity   ROM: within functional limits   Strength: within functional limits   Coordination  Gross motor: WNl  Fine motor: WNL  Sensation: Light touch:  intact    EDUCATION PROVIDED  Patient: Role of Occupational Therapy; Plan of Care; Discharge Recommendations  Patient's Response to Education: Verbalized Understanding; Returned Demonstration    Equipment used: RW  Demonstrates functional use, Would benefit from additional trial      Therapist comments: Pt with education on log roll technique. Pt educated on spine techniques to help with pain management.  Pt will benefit from education on brace management    Patient End of Session: In bed;Needs met;Call light within reach;All patient questions and concerns addressed;SCDs in place;Alarm set    OCCUPATIONAL PROFILE    HOME SITUATION  Type of Home: Apartment  Home Layout: Two level  Lives With: Spouse    Toilet and Equipment: Standard height toilet  Shower/Tub and Equipment: Walk-in shower  Other Equipment: None          Drives: Yes       Prior Level of Function: Pt typically independent with ADLs and mobility. Pt does not use AD.    SUBJECTIVE   Pt stated, \"I am doing okay.\"    PAIN ASSESSMENT  Ratin  Location: no pain at this time       OBJECTIVE  Precautions: Spine (TLSO for comfort)  Fall Risk: Standard fall risk      ASSESSMENTS    AM-PAC ‘6-Clicks’ Inpatient Daily Activity Short Form  -   Putting on and taking off regular lower body clothing?: A Little  -   Bathing (including washing, rinsing, drying)?: A Little  -   Toileting, which includes using toilet, bedpan or urinal? : A Little  -   Putting on and taking off  regular upper body clothing?: A Little  -   Taking care of personal grooming such as brushing teeth?: A Little  -   Eating meals?: A Little    AM-PAC Score:  Score: 18  Approx Degree of Impairment: 46.65%  Standardized Score (AM-PAC Scale): 38.66    ADDITIONAL TESTS     NEUROLOGICAL FINDINGS      COGNITION ASSESSMENTS       PLAN  OT Treatment Plan: Balance activities;Energy conservation/work simplification techniques;ADL training;IADL training;Continued evaluation;Compensatory technique education;Equipment eval/education;Patient/Family training;Patient/Family education;Endurance training;UE strengthening/ROM  Rehab Potential : Good  Frequency: 3-5x/week  Number of Visits to Meet Established Goals: 5    ADL Goals   Patient will perform upper body dressing:  with supervision  Patient will perform lower body dressing:  with supervision  Patient will perform toileting: with supervision    Functional Transfer Goals  Patient will transfer from supine to sit:  with supervision  Patient will transfer from sit to stand:  with supervision  Patient will transfer to toilet:  with supervision    UE Exercise Program Goal  Patient will be supervision with bilateral AROM HEP (home exercise program).    Additional Goals:  Pt will verbalize at least 3 energy conservation techniques  Pt will stand at sink for 5 minutes to complete grooming routine    Patient Evaluation Complexity Level:   Occupational Profile/Medical History MODERATE - Expanded review of history including review of medical or therapy record   Specific performance deficits impacting engagement in ADL/IADL MODERATE  3 - 5 performance deficits   Client Assessment/Performance Deficits MODERATE - Comorbidities and min to mod modifications of tasks    Clinical Decision Making MODERATE - Analysis of occupational profile, detailed assessments, several treatment options    Overall Complexity MODERATE     OT Session Time: 15 minutes  Self-Care Home Management: 0  minutes  Therapeutic Activity: 10 minutes  Neuromuscular Re-education: 0 minutes  Therapeutic Exercise: 0 minutes  Cognitive Skills: 0 minutes  Sensory Integrative: 0 minutes  Orthotic Management and Trainin minutes  Can add/delete any of these

## 2024-02-08 NOTE — PROGRESS NOTES
Paged by primary RN with concern about amiodarone. Per RN, patient vomited x 2 tonight. RN states pt was given IV Zofran and had another episode of vomiting. RN states pt and family concerned vomiting may be related to IV amiodarone. Patient on po amiodarone at home and tolerates without difficulty. Advised RN to monitor for now. No change to plan of care.

## 2024-02-08 NOTE — PLAN OF CARE
Pt remains SR ,HR 60's  Dobutamine gtt stopped around 0650 this am  Entresto started,  1640 My second call to  to order TLSO brace,spoke with Greg  MRI spine thoracic ordered,screening done   Continue to monitor    1700 received a call from Victoria from [facility] , returning my call from this am. They will call prior to coming tonight ,make sure pt is in the room ,other wise they will come tomorrow.

## 2024-02-08 NOTE — PLAN OF CARE
0800 follow up consult called to Dr Bonilla thru [facility] waiting return call.    0843 Charlene DIAS message RN back Dr Bonilla is on vacation.Dr Mcgarry will review the consult.

## 2024-02-08 NOTE — PROGRESS NOTES
Pt. vomited x2 without pre warning signs. When asked what pt. might have eaten during dinner, per  his wife did not eat dinner. Zofran 4mg IV given. Pt. had another vomiting episode an hour later. Ice chips offered. Pt. refused Reglan as she's not feeling nauseated anymore. KOSTA Cisse notified of vomiting episodes and pt. on Amiodarone drip. No new order at this time. Will continue to monitor.

## 2024-02-08 NOTE — PLAN OF CARE
Pt c/o nausea and vomited x1 this am .Zofran 4mg IV given with help   No bm x3 days, Miralax and Senokot given  Still c/o back pain Tylenol given ,and warm pks applied and helping  New orders drom neuro surgery noted  Will monitor      Problem: CARDIOVASCULAR - ADULT  Goal: Maintains optimal cardiac output and hemodynamic stability  Description: INTERVENTIONS:  - Monitor vital signs, rhythm, and trends  - Monitor for bleeding, hypotension and signs of decreased cardiac output  - Evaluate effectiveness of vasoactive medications to optimize hemodynamic stability  - Monitor arterial and/or venous puncture sites for bleeding and/or hematoma  - Assess quality of pulses, skin color and temperature  - Assess for signs of decreased coronary artery perfusion - ex. Angina  - Evaluate fluid balance, assess for edema, trend weights  2/8/2024 1308 by Viviana Augustin RN  Outcome: Progressing  2/8/2024 1307 by Viviana Augustin RN  Outcome: Progressing  Goal: Absence of cardiac arrhythmias or at baseline  Description: INTERVENTIONS:  - Continuous cardiac monitoring, monitor vital signs, obtain 12 lead EKG if indicated  - Evaluate effectiveness of antiarrhythmic and heart rate control medications as ordered  - Initiate emergency measures for life threatening arrhythmias  - Monitor electrolytes and administer replacement therapy as ordered  2/8/2024 1308 by Viviana Augustin RN  Outcome: Progressing  2/8/2024 1307 by Viviana Augustin RN  Outcome: Progressing     Problem: RESPIRATORY - ADULT  Goal: Achieves optimal ventilation and oxygenation  Description: INTERVENTIONS:  - Assess for changes in respiratory status  - Assess for changes in mentation and behavior  - Position to facilitate oxygenation and minimize respiratory effort  - Oxygen supplementation based on oxygen saturation or ABGs  - Provide Smoking Cessation handout, if applicable  - Encourage broncho-pulmonary hygiene including cough, deep breathe, Incentive Spirometry  -  Assess the need for suctioning and perform as needed  - Assess and instruct to report SOB or any respiratory difficulty  - Respiratory Therapy support as indicated  - Manage/alleviate anxiety  - Monitor for signs/symptoms of CO2 retention  Outcome: Progressing     Problem: PAIN - ADULT  Goal: Verbalizes/displays adequate comfort level or patient's stated pain goal  Description: INTERVENTIONS:  - Encourage pt to monitor pain and request assistance  - Assess pain using appropriate pain scale  - Administer analgesics based on type and severity of pain and evaluate response  - Implement non-pharmacological measures as appropriate and evaluate response  - Consider cultural and social influences on pain and pain management  - Manage/alleviate anxiety  - Utilize distraction and/or relaxation techniques  - Monitor for opioid side effects  - Notify MD/LIP if interventions unsuccessful or patient reports new pain  - Anticipate increased pain with activity and pre-medicate as appropriate  Outcome: Progressing

## 2024-02-08 NOTE — CONSULTS
[facility]  GABE Neurosurgery Consult    Angeles Anand Patient Status:  Inpatient    3/6/1945 MRN [id number]   Location [facility] 8NE-A Attending Elizabeth Freeman MD   Hosp Day # 2 PCP Charlene Huang MD     REASON FOR CONSULTATION:  T8 compression fracture    HISTORY OF PRESENT ILLNESS     Angeles Anand is a pleasant 78 year old female with PMH who presented to ED per the recommendation of her PCP for bilateral lower extremity edema, poor appetite and dyspnea. She was subsequently admitted for acute on chronic heart failure and is being followed by cardiology.  She also reported mid to low back pain on admission. She admits to falling twice at home within the last week, for which she did not seek medical attention. XR thoracic spine reveals mild compression deformity of T8, for which Neurosurgery is consulted. XR lumbar spine is unremarkable.     On exam, pt reports back pain in the distribution above. She has point tenderness to the mid thoracic region. States her back pain last night was \"excruciating.\" She is ambulatory. Denies numbness, tingling, or weakness of UE or LE. Denies changes in bowel or bladder habits.    PAST MEDICAL HISTORY     Past Medical History:   Diagnosis Date    Arrhythmia     ATRIAL FIBRILLATION     Dr. Guerra    Atrial flutter (HCC)     Breast cancer (HCC)     INVASIVE DUCTAL    CANCER 10/96    breast CIS- ?ductal     Cancer (HCC)     CORONARY ARTERY DISEASE     Endocrine disorder     Gout     History of blood transfusion     Hypertension     Lymphedema of upper extremity following lymphadenectomy 2014    Migraines     Osteopenia     Other and unspecified hyperlipidemia     Unspecified essential hypertension     Visual impairment      PAST SURGICAL HISTORY:  Past Surgical History:   Procedure Laterality Date    ANGIOGRAM      APPENDECTOMY      APPENDECTOMY      CABG  2004    HYSTERECTOMY      MIKE/BSO- fibroid uterus    LUMPECTOMY LEFT  2013    invasive  ductal    COLBY LOCALIZATION WIRE 1 SITE LEFT (CPT=19281)  1998;1996    NEEDLE BIOPSY LEFT  2012    us core bx-papilloma    NEEDLE BIOPSY RIGHT  2/2013    mri bx-sclerosing adenosis    OOPHORECTOMY  1997    OTHER  2015    thoracic aortic aneurysm repair    OTHER SURGICAL HISTORY  2004    Surgery for aortic dissection- aortic root repair, aortic valve replacement, single CABG    OTHER SURGICAL HISTORY       left breast biopsy in 1996 and 1998    OTHER SURGICAL HISTORY  3/1/2013    re-excision left lumpectomy    RADIATION LEFT  5/2013     FAMILY HISTORY:  family history includes Breast Cancer (age of onset: 67) in her self; Diabetes in her mother; Heart Disorder in her father.    SOCIAL HISTORY:   reports that she quit smoking about 36 years ago. Her smoking use included cigarettes. She has never used smokeless tobacco. She reports current alcohol use of about 4.0 standard drinks of alcohol per week. She reports that she does not use drugs.    ALLERGIES     Allergies   Allergen Reactions    Gadolinium     Radiology Contrast Iodinated Dyes     Kiwi Extract SWELLING     MEDICATIONS     Medications Prior to Admission   Medication Sig Dispense Refill Last Dose    cephalexin 500 MG Oral Cap Take 1 capsule (500 mg total) by mouth 4 (four) times daily for 7 days. 28 capsule 0 2/6/2024 at 12 noon    metoprolol succinate ER 50 MG Oral Tablet 24 Hr Take 1 tablet (50 mg total) by mouth 2x Daily(Beta Blocker). 60 tablet 11 2/6/2024 at am    amiodarone 200 MG Oral Tab 1 tab po bid x 7 days, then 1 tablet daily there after 40 tablet 3 2/6/2024 at am    anastrozole 1 MG Oral Tab tab Take 1 tablet (1 mg total) by mouth daily. (Patient taking differently: Take 1 tablet (1 mg total) by mouth daily.) 90 tablet 3 2/6/2024 at am    warfarin 2.5 MG Oral Tab Take 1 tablet (2.5 mg total) by mouth nightly. Take as directed by the warfarin clinic   2/4/2024 at hs    Calcium-Vitamin D (CALTRATE 600 PLUS-VIT D OR) Take 600 mg by mouth 2 (two)  times daily.     2/6/2024 at am    ramipril (ALTACE) 5 MG Oral Cap Take 1 capsule (5 mg total) by mouth daily. 30 capsule 11 2/6/2024 at am    simvastatin 40 MG Oral Tab Take 1 tablet (40 mg total) by mouth nightly.   2/5/2024 at hs     Current Facility-Administered Medications   Medication Dose Route Frequency    sacubitril-valsartan (Entresto) 24-26 MG per tab 1 tablet  1 tablet Oral BID    amiodarone (Pacerone) tab 200 mg  200 mg Oral Daily    dilTIAZem (cardIZEM) 25 mg/5mL injection 10 mg  10 mg Intravenous Q2H PRN    eplerenone (Inspra) tab 25 mg  25 mg Oral Daily    acetaminophen (Tylenol Extra Strength) tab 500 mg  500 mg Oral Q4H PRN    polyethylene glycol (PEG 3350) (Miralax) 17 g oral packet 17 g  17 g Oral Daily PRN    sennosides (Senokot) tab 17.2 mg  17.2 mg Oral Nightly PRN    bisacodyl (Dulcolax) 10 MG rectal suppository 10 mg  10 mg Rectal Daily PRN    fleet enema (Fleet) 7-19 GM/118ML rectal enema 133 mL  1 enema Rectal Once PRN    melatonin tab 3 mg  3 mg Oral Nightly PRN    metoclopramide (Reglan) 5 mg/mL injection 5 mg  5 mg Intravenous Q8H PRN    anastrozole (Arimidex) tab 1 mg  1 mg Oral Daily    metoprolol succinate ER (Toprol XL) 24 hr tab 50 mg  50 mg Oral 2x Daily(Beta Blocker)    atorvastatin (Lipitor) tab 40 mg  40 mg Oral Nightly       REVIEW OF SYSTEMS     Comprehensive Review of Systems obtained, and is negative other than that mentioned in the History of Present Illness.      PHYSICAL EXAMINATION     VITALS: /80 (BP Location: Right arm)   Pulse 60   Temp 97.8 °F (36.6 °C) (Oral)   Resp 18   Wt 123 lb 3.8 oz (55.9 kg)   SpO2 94%   BMI 21.15 kg/m²     GENERAL:  No acute distress, non-toxic appearing, speech fluent, mood appropriate    HEENT:  Normocephalic, atraumatic    RESP: Non-labored, easy, even    CV: NSR on tele    NEUROLOGICAL:  Alert and oriented x 3.  Sensation to light touch is intact bilaterally.  WONG x 4. Gait deferred.  +Point tenderness to mid thoracic region  on palpation.     UPPER EXTREMITY STRENGTH:    Deltoid  Biceps  Triceps        Right 5 5 5 5     Left 5 5 5 5     LOWER EXTREMITY STRENGTH:    Iliospoas  Hamstrings  Quads  D-flexion  P-flexion EHL     Right 5 5 5 5 5 5     Left 5 5 5 5 5 5     DTRs:     Biceps    Triceps   Brachioradialis     Patellar     Ankle     Right       2+         2+            2+         2+        2+     Left       2+         2+             2+         2+        2+      DIAGNOSTIC DATA     Lab Results   Component Value Date    CREATSERUM 0.96 02/08/2024    BUN 18 02/08/2024     02/08/2024    K 3.4 02/08/2024    K 3.4 02/08/2024     02/08/2024    CO2 25.0 02/08/2024     02/08/2024    CA 9.0 02/08/2024    INR 4.32 02/08/2024    PTP 42.2 02/08/2024     IMAGING     XR THORACIC SPINE (3 VIEWS) (CPT=72072)    Result Date: 2/7/2024  CONCLUSION:  1. Moderate to severe thoracic degenerative changes with accentuation of the thoracic kyphosis.  No subluxation. 2. Minimal anterior wedge shape of T8.  This is temporally indeterminate for compression injury.  It may not be acute.  Please correlate with exact symptoms and physical examination findings.  If there is concern for acute injury, MRI would be the next modality of choice for assessment.    LOCATION:  [facility]    Dictated by (CST): Dave Smith MD on 2/07/2024 at 11:26 AM     Finalized by (CST): Dave Smith MD on 2/07/2024 at 11:30 AM       XR LUMBAR SPINE (MIN 2 VIEWS) (CPT=72100)    Result Date: 2/7/2024  CONCLUSION:  Scoliotic and degenerative changes.   LOCATION:  [facility]   Dictated by (CST): Dave Smith MD on 2/07/2024 at 11:24 AM     Finalized by (CST): Dave Smith MD on 2/07/2024 at 11:25 AM         ASSESSMENT & PLAN     ASSESSMENT:  T8 compression fracture    PLAN:  Discussed with Dr. Esteves  No acute surgical intervention is indicated at this time  MRI thoracic to determine chronicity of fracture  Patient states she may require antianxiety medication prior to imaging  - will defer to hospitalist.   Call  to obtain TLSO brace. May wear for comfort.   PT/OT   Medical management and pain control per hospitalist    Thank you very much for the consult.    KOSTA Finney-NP  [facility]  2/8/2024    Total visit time: 20 minutes; More than 50% spent coordinating care, counseling, reviewing imaging and discussing medication therapy.   Is this a shared or split note between Advanced Practice Provider and Physician? Yes  To see patient  She is off the floor  Note reviewed  Discussed with PA  Plan for brace  Also plan for MRI  If possible they would like possible kyphoplasty  Following

## 2024-02-08 NOTE — CONSULTS
[facility] Pharmacy Dosing Service  Warfarin (Coumadin) Subsequent Dosing    Angeles Anand is a 78 year old patient for whom pharmacy is dosing warfarin (Coumadin). Goal INR is 2-3    Recent Labs   Lab 02/04/24  1627 02/06/24  1818 02/07/24  0537 02/08/24  0456   INR 3.45* 3.55* 3.32* 4.32*       Consulted by:  Dr Freeman  Indication:  Afib  Potential Drug Interactions:  amiodarone, on PTA now started on amiodarone drip  Other Anticoagulants:  none  Home regimen (if applicable):  warfarin 2.5 mg at bedtime      Inpatient Dosing History:    Date 2/6 2/7 2/8         INR 3.55 3.32  4.32         Coumadin dose held Will hold                              Based on above -  1.  For today, Hold warfarin (COUMADIN) dose - supratherapeutic INR  2   PT/INR ordered daily while on warfarin  3.  Pharmacy will continue to follow.  We appreciate the opportunity to assist in the care of this patient.    Annalisa Molina, PharmD  2/8/2024  9:59 AM

## 2024-02-08 NOTE — PLAN OF CARE
Patient alert and oriented x4. Forgetful at times. On O2 2l/nc when received. Resp regular and unlabored. SB w/ first degree AVB w/ BBB in tele. Amio drip infusing well. IV site free of complications.  Up SBA. Continent of B&B. No pain complained.       Problem: CARDIOVASCULAR - ADULT  Goal: Maintains optimal cardiac output and hemodynamic stability  Description: INTERVENTIONS:  - Monitor vital signs, rhythm, and trends  - Monitor for bleeding, hypotension and signs of decreased cardiac output  - Evaluate effectiveness of vasoactive medications to optimize hemodynamic stability  - Monitor arterial and/or venous puncture sites for bleeding and/or hematoma  - Assess quality of pulses, skin color and temperature  - Assess for signs of decreased coronary artery perfusion - ex. Angina  - Evaluate fluid balance, assess for edema, trend weights  Outcome: Progressing  Goal: Absence of cardiac arrhythmias or at baseline  Description: INTERVENTIONS:  - Continuous cardiac monitoring, monitor vital signs, obtain 12 lead EKG if indicated  - Evaluate effectiveness of antiarrhythmic and heart rate control medications as ordered  - Initiate emergency measures for life threatening arrhythmias  - Monitor electrolytes and administer replacement therapy as ordered  Outcome: Progressing     Problem: SAFETY ADULT - FALL  Goal: Free from fall injury  Description: INTERVENTIONS:  - Assess pt frequently for physical needs  - Identify cognitive and physical deficits and behaviors that affect risk of falls.  - [place] fall precautions as indicated by assessment.  - Educate pt/family on patient safety including physical limitations  - Instruct pt to call for assistance with activity based on assessment  - Modify environment to reduce risk of injury  - Provide assistive devices as appropriate  - Consider OT/PT consult to assist with strengthening/mobility  - Encourage toileting schedule  Outcome: Progressing     Problem: RESPIRATORY -  ADULT  Goal: Achieves optimal ventilation and oxygenation  Description: INTERVENTIONS:  - Assess for changes in respiratory status  - Assess for changes in mentation and behavior  - Position to facilitate oxygenation and minimize respiratory effort  - Oxygen supplementation based on oxygen saturation or ABGs  - Provide Smoking Cessation handout, if applicable  - Encourage broncho-pulmonary hygiene including cough, deep breathe, Incentive Spirometry  - Assess the need for suctioning and perform as needed  - Assess and instruct to report SOB or any respiratory difficulty  - Respiratory Therapy support as indicated  - Manage/alleviate anxiety  - Monitor for signs/symptoms of CO2 retention  Outcome: Progressing

## 2024-02-08 NOTE — PHYSICAL THERAPY NOTE
PHYSICAL THERAPY EVALUATION - INPATIENT     Room Number: 8609/8609-A  Evaluation Date: 2/8/2024  Type of Evaluation: Initial  Physician Order: PT Eval and Treat    Presenting Problem: Afib, T8 compression fracture  Co-Morbidities : Afib, CAD, lymphedema, gout  Reason for Therapy: Mobility Dysfunction and Discharge Planning    PHYSICAL THERAPY ASSESSMENT   Patient is currently functioning near baseline with bed mobility, transfers, and gait.  Prior to admission, patient's baseline is independent with ADL and mobility.  Patient is requiring contact guard assist as a result of the following impairments: decreased endurance/aerobic capacity and pain.  Physical Therapy will continue to follow for duration of hospitalization.    Patient will benefit from continued skilled PT Services at discharge to promote functional independence in home.  Anticipate patient will return home with home health PT.    PLAN  PT Treatment Plan: Bed mobility;Endurance;Energy conservation;Patient education;Gait training;Balance training;Transfer training;Stair training;Strengthening  Rehab Potential : Good  Frequency (Obs): 3-5x/week  Number of Visits to Meet Established Goals: 3      CURRENT GOALS    Goal #1 Patient is able to demonstrate supine - sit EOB @ level: supervision     Goal #2 Patient is able to demonstrate transfers EOB to/from C at assistance level: supervision     Goal #3 Patient is able to ambulate 150 feet with assist device: walker - rolling at assistance level: supervision     Goal #4 Pt will ascend/descend 1 flight of stairs with supervision   Goal #5    Goal #6    Goal Comments: Goals established on 2/8/2024      PHYSICAL THERAPY MEDICAL/SOCIAL HISTORY    History related to current admission: Patient is a 78 year old female admitted on 2/6/2024 from home for dyspnea and Afib.  Pt diagnosed with CHF. Pt with recent fall and back pain. XRAY revealing for possible acute T8 compression fracture. Neurosurgery  recommending TLSO brace for comfort. Pt ok to be up without brace.     HOME SITUATION  Type of Home: Apartment   Home Layout: Two level  Stairs to Enter : 15  Railing: Yes          Lives With: Spouse  Drives: Yes  Patient Owned Equipment: Rolling walker       Prior Level of [place]: Pt lives with spouse in 2nd floor apartment. Pt independent with ADL and mobility. Pt does not ambulate with RW or cane.     SUBJECTIVE  \"I am a little short of breath.\" Reports while walking      OBJECTIVE  Precautions: Spine (TLSO for comfort)  Fall Risk: Standard fall risk    WEIGHT BEARING RESTRICTION  Weight Bearing Restriction: None                PAIN ASSESSMENT  Rating: 3  Location: back  Management Techniques: Activity promotion;Repositioning;Body mechanics    COGNITION  Overall Cognitive Status:  WFL - within functional limits    RANGE OF MOTION AND STRENGTH ASSESSMENT  Upper extremity ROM and strength are within functional limits     Lower extremity ROM is within functional limits     Lower extremity strength is within functional limits except for the following:    Right Knee extension  4+/5  Left Knee extension  4+/5      BALANCE  Static Sitting: Good  Dynamic Sitting: Good  Static Standing: Fair -  Dynamic Standing: Poor +    ADDITIONAL TESTS                                    ACTIVITY TOLERANCE                         O2 WALK  Oxygen Therapy  SPO2% on Room Air at Rest: 97  SPO2% Ambulation on Room Air: 87    NEUROLOGICAL FINDINGS                        AM-PAC '6-Clicks' INPATIENT SHORT FORM - BASIC MOBILITY  How much difficulty does the patient currently have...  Patient Difficulty: Turning over in bed (including adjusting bedclothes, sheets and blankets)?: A Little   Patient Difficulty: Sitting down on and standing up from a chair with arms (e.g., wheelchair, bedside commode, etc.): A Little   Patient Difficulty: Moving from lying on back to sitting on the side of the bed?: A Little   How much help from another person  does the patient currently need...   Help from Another: Moving to and from a bed to a chair (including a wheelchair)?: A Little   Help from Another: Need to walk in hospital room?: A Little   Help from Another: Climbing 3-5 steps with a railing?: A Little       AM-PAC Score:  Raw Score: 18   Approx Degree of Impairment: 46.58%   Standardized Score (AM-PAC Scale): 43.63   CMS Modifier (G-Code): CK    FUNCTIONAL ABILITY STATUS  Gait Assessment   Functional Mobility/Gait Assessment  Gait Assistance: Contact guard assist  Distance (ft): 150  Assistive Device: Rolling walker  Pattern:  (slow yanira, excessive kyphosis)    Skilled Therapy Provided     Bed Mobility:  Rolling: supervision  Supine to sit: supervision   Sit to supine: supervision     Transfer Mobility:  Sit to stand: CGA   Stand to sit: CGA  Gait = CGA    Gait training:  Pt ambulates with excessive kyphosis and slow yanira.   CGA for safety due to Pts reports of LE weakness.   VC for pacing and posture.   Pt educated on energy conservation and taking rest breaks.     Therapist's Comments: Pt educated on log roll technique for bed mobility and spine precautions to minimize pain. Also discussed brace use for comfort- still to be delivered by [facility] as well as activity recommendations.     Exercise/Education Provided:  Bed mobility  Energy conservation  Functional activity tolerated  Gait training  Posture  Strengthening  Transfer training    Patient End of Session: Up in chair;Needs met;Call light within reach;RN aware of session/findings;All patient questions and concerns addressed;Alarm set      Patient Evaluation Complexity Level:  History High - 3 or more personal factors and/or co-morbidities   Examination of body systems Moderate - addressing a total of 3 or more elements   Clinical Presentation Moderate - Evolving   Clinical Decision Making Moderate - Evolving       PT Session Time: 22 minutes  Gait Trainin minutes

## 2024-02-08 NOTE — PROGRESS NOTES
[facility] Hospitalist Progress Note     Angeles Anand Patient Status:  Inpatient    3/6/1945 MRN [id number]   Location [facility] 8NE-A Attending Elizabeth Freeman MD   Hosp Day # 2 PCP Charlene Huang MD     Chief Complaint:   Fu Ble swelling, sob, afib    Subjective:     Patient seen and examined.   C/o nausea and vomiting since yest  No BM x 3 days  Still having 5/10 back pain  No other major complaints  Afebrile    Objective:    Review of Systems:   10 point ROS completed and was negative, except for pertinent positive and negatives stated in subjective.    Vital signs:  Temp:  [97.5 °F (36.4 °C)-98.2 °F (36.8 °C)] 97.6 °F (36.4 °C)  Pulse:  [] 60  Resp:  [16-18] 16  BP: (115-145)/(72-85) 145/85  SpO2:  [87 %-98 %] 93 %    Physical Exam:    General: No acute distress.   HEENT:  EOMI, PERRLA, OP clear  Respiratory: Clear to auscultation bilaterally. No wheezes. No rhonchi.  Cardiovascular: S1, S2. Regular rate and rhythm. No murmurs.  Abdomen: Soft, nontender, nondistended.  Positive bowel sounds. No rebound or guarding.  Extremities: No edema.  Neuro:  Grossly non focal, no motor deficits.        Diagnostic Data:    Labs:  Recent Labs   Lab 24  1627 24  0536 24  0537 24  0456   WBC 5.1 6.6 7.4  --   --    HGB 12.3 13.0 12.7  --   --    MCV 90.3 87.9 83.4  --   --    .0 174.0 161.0  --   --    INR 3.45* 3.55*  --  3.32* 4.32*       Recent Labs   Lab 24  0537 24  0456   * 95 135*   BUN 19 14 18   CREATSERUM 0.88 0.84 0.96   CA 9.1 8.5 9.0   ALB 3.6  --   --     143 139   K 4.1 3.7 3.4*  3.4*    112 106   CO2 26.0 27.0 25.0   ALKPHO 77  --   --    AST 36  --   --    ALT 49  --   --    BILT 0.8  --   --    TP 7.1  --   --        Estimated Creatinine Clearance: 42.4 mL/min (based on SCr of 0.96 mg/dL).    Recent Labs   Lab 24  1818 24  0537 24  0456   PTP 36.1*  34.3* 42.2*   INR 3.55* 3.32* 4.32*            COVID-19 Lab Results    COVID-19  Lab Results   Component Value Date    COVID19 Not Detected 01/21/2024       Pro-Calcitonin  No results for input(s): \"PCT\" in the last 168 hours.    Cardiac  No results for input(s): \"TROP\", \"PBNP\" in the last 168 hours.    Creatinine Kinase  No results for input(s): \"CK\" in the last 168 hours.    Inflammatory Markers  No results for input(s): \"CRP\", \"GUSTAVO\", \"LDH\", \"DDIMER\" in the last 168 hours.    Imaging: Imaging data reviewed in Epic.    Medications:    amiodarone  200 mg Oral BID with meals    potassium chloride  40 mEq Oral Q4H    eplerenone  25 mg Oral Daily    furosemide  40 mg Intravenous BID (Diuretic)    anastrozole  1 mg Oral Daily    metoprolol succinate ER  50 mg Oral 2x Daily(Beta Blocker)    atorvastatin  40 mg Oral Nightly       Assessment & Plan:    Angeles Anand is a 78 year old female with PMH sig for paroxysmal A-fib, a flutter, CAD, history of CABG, hypertension, hyperlipidemia, SAVR, chronic thrombocytopenia, leukopenia 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     BLE swelling, sob 2/2 acute on chronic HFrEF  Elevated pBNP  -monitor on tele  -daily wts  -strict I/os  -ECHO reviewed from 1/22/24   -amio gtt >> po amio per cards  -diuresis per cards - 40 IV lasix BID  -cardiology consulted >> apprec recs  -hold ace - in anticipation of transitioning to entresto     Back pain - s/p fall  -prn pain control  -xray T and L spine shows likely acute T8 compression fracture >> orthospine consulted  -awaiting orthospine assessment  -PT/OT     Constipation w/ nausea and vomiting  -r/o obstruction >> xray obstructive series ordered  -bowel regimen  -possibly nausea is related to amio gtt    Afib with RVR  Coagulapathy  Supratherapeutic INR  -inr 3.5 >> warfarin on hold (pharm to dose)  -monitor on tele  -cont BB, amio     CAD sp CABG w/ SACR  -cards  following  -ECHO reviewed     HTN  -hold ace   -transition to entresto  -BB     Hypothyroidism  -synthroid  -check TSH     Quality:  DVT Prophylaxis: warfarin, scds  CODE status: full code  Shrestha: no  If COVID testing is negative, may discontinue isolation: yes      Plan of care discussed with patient and wife, all questions answered.      DISPO  Cont inpt     Elizabeth Alexy Hospitalist  Pager [phone]  Answering Service number: [phone]            **Certification      PHYSICIAN Certification of Need for Inpatient Hospitalization - Initial Certification    Patient will require inpatient services that will reasonably be expected to span two midnight's based on the clinical documentation in H+P.   Based on patients current state of illness, I anticipate that, after discharge, patient will require TBD.

## 2024-02-09 LAB
ALBUMIN SERPL-MCNC: 2.9 G/DL (ref 3.4–5)
ALBUMIN/GLOB SERPL: 0.9 {RATIO} (ref 1–2)
ALP LIVER SERPL-CCNC: 79 U/L
ALT SERPL-CCNC: 440 U/L
ANION GAP SERPL CALC-SCNC: 7 MMOL/L (ref 0–18)
AST SERPL-CCNC: 440 U/L (ref 15–37)
BILIRUB SERPL-MCNC: 1.2 MG/DL (ref 0.1–2)
BUN BLD-MCNC: 16 MG/DL (ref 9–23)
CALCIUM BLD-MCNC: 8.6 MG/DL (ref 8.5–10.1)
CHLORIDE SERPL-SCNC: 111 MMOL/L (ref 98–112)
CO2 SERPL-SCNC: 23 MMOL/L (ref 21–32)
CREAT BLD-MCNC: 0.68 MG/DL
EGFRCR SERPLBLD CKD-EPI 2021: 90 ML/MIN/1.73M2 (ref 60–?)
GLOBULIN PLAS-MCNC: 3.1 G/DL (ref 2.8–4.4)
GLUCOSE BLD-MCNC: 89 MG/DL (ref 70–99)
INR BLD: 3.58 (ref 0.8–1.2)
NT-PROBNP SERPL-MCNC: 8616 PG/ML (ref ?–450)
OSMOLALITY SERPL CALC.SUM OF ELEC: 293 MOSM/KG (ref 275–295)
POTASSIUM SERPL-SCNC: 4.3 MMOL/L (ref 3.5–5.1)
PROT SERPL-MCNC: 6 G/DL (ref 6.4–8.2)
PROTHROMBIN TIME: 36.3 SECONDS (ref 11.6–14.8)
SODIUM SERPL-SCNC: 141 MMOL/L (ref 136–145)

## 2024-02-09 PROCEDURE — 99231 SBSQ HOSP IP/OBS SF/LOW 25: CPT | Performed by: NEUROLOGICAL SURGERY

## 2024-02-09 RX ORDER — AMIODARONE HYDROCHLORIDE 200 MG/1
200 TABLET ORAL 2 TIMES DAILY WITH MEALS
Status: DISCONTINUED | OUTPATIENT
Start: 2024-02-09 | End: 2024-02-09

## 2024-02-09 RX ORDER — AMIODARONE HYDROCHLORIDE 200 MG/1
200 TABLET ORAL DAILY
Status: DISCONTINUED | OUTPATIENT
Start: 2024-02-10 | End: 2024-02-12

## 2024-02-09 RX ORDER — SIMETHICONE 80 MG
80 TABLET,CHEWABLE ORAL 4 TIMES DAILY PRN
Status: DISCONTINUED | OUTPATIENT
Start: 2024-02-09 | End: 2024-02-12

## 2024-02-09 RX ORDER — DIGOXIN 0.25 MG/ML
250 INJECTION INTRAMUSCULAR; INTRAVENOUS ONCE
Status: COMPLETED | OUTPATIENT
Start: 2024-02-09 | End: 2024-02-09

## 2024-02-09 RX ORDER — TORSEMIDE 20 MG/1
20 TABLET ORAL DAILY
Status: DISCONTINUED | OUTPATIENT
Start: 2024-02-09 | End: 2024-02-12

## 2024-02-09 NOTE — PLAN OF CARE
Assumed care of patient at 2130. Patient return to AF at 2148, sleeping , asymptomatic, VSS. Heart rates 90-low 100's. Cardiology APN notified, no further treatment to be given at this time, continue the course for AM. On 2 Liters per nasal cannula. Spouse at bedside informed of plan of care. Call light within reach, will continue to monitor.     Patient converted back into SR at around 0148 for about 45 mins and back into AF with heart rates 90-low 100's.     0640-patient Sinus bradycardic.

## 2024-02-09 NOTE — PROGRESS NOTES
Nausea improved yet hasn't eaten anything since Wednesday - no bowel movement and abdominal film with stool right colon    Tele continues to demonstrate PAF - asymptomatic at present      Minimal urine output recorded    Lungs clear  Ht irregular - soft PANCHITO  Abd soft  Ext no edema  Neuro intact      Transaminases elevated 440/440    Pro-bnp 8616    16/0.7    K+ 4.3    INR 3.6        A/P: Acute on chronic HFrEF - clinically precipitated by PAF.    Not ready for discharge - presume transaminases elevated due to IV amiodarone - despite PAF will need to back off on dosing    Tough to know intravascular volume status - she appears dry clinically despite her elevated Pro-bnp and small pleural effusions - will hold off on aggressive loop diuretics for now - will need to reassess daily    Continue to hold warfarin    Hold statin for now given elevated transaminases    Ambulate    Will hold off on Gated CTA pulmonary veins given marked contrast allergy and other active issues      Reviewed with patient and  in detail        L3

## 2024-02-09 NOTE — PROGRESS NOTES
[facility] Hospitalist Progress Note     Angeles Anand Patient Status:  Inpatient    3/6/1945 MRN [id number]   Location [facility] 8NE-A Attending Elizabeth Freeman MD   Hosp Day # 3 PCP Charlene Huang MD     Chief Complaint:   Fu Ble swelling, sob, afib    Subjective:     Patient seen and examined.   C/o nausea and vomiting since yest  No BM x 4 days  Down 6 pounds  Still having 5/10 back pain  No other major complaints  Afebrile    Objective:    Review of Systems:   10 point ROS completed and was negative, except for pertinent positive and negatives stated in subjective.    Vital signs:  Temp:  [97.7 °F (36.5 °C)-98.1 °F (36.7 °C)] 97.7 °F (36.5 °C)  Pulse:  [] 101  Resp:  [16-20] 18  BP: (109-142)/(61-80) 109/77  SpO2:  [93 %-96 %] 94 %    Physical Exam:    General: No acute distress.   HEENT:  EOMI, PERRLA, OP clear  Respiratory: Clear to auscultation bilaterally. No wheezes. No rhonchi.  Cardiovascular: S1, S2. Regular rate and rhythm. No murmurs.  Abdomen: Soft, nontender, nondistended.  Positive bowel sounds. No rebound or guarding.  Extremities: No edema.  Neuro:  Grossly non focal, no motor deficits.        Diagnostic Data:    Labs:  Recent Labs   Lab 24  1627 248 24  0536 24  0537 24  0456 24  0459   WBC 5.1 6.6 7.4  --   --   --    HGB 12.3 13.0 12.7  --   --   --    MCV 90.3 87.9 83.4  --   --   --    .0 174.0 161.0  --   --   --    INR 3.45* 3.55*  --  3.32* 4.32* 3.58*       Recent Labs   Lab 24  1818 24  0537 24  0456 24  1459 24  0459   * 95 135*  --  89   BUN 19 14 18  --  16   CREATSERUM 0.88 0.84 0.96  --  0.68   CA 9.1 8.5 9.0  --  8.6   ALB 3.6  --   --   --  2.9*    143 139  --  141   K 4.1 3.7 3.4*  3.4* 5.0 4.3    112 106  --  111   CO2 26.0 27.0 25.0  --  23.0   ALKPHO 77  --   --   --  79   AST 36  --   --   --  440*   ALT 49  --   --   --  440*    BILT 0.8  --   --   --  1.2   TP 7.1  --   --   --  6.0*       Estimated Creatinine Clearance: 59.8 mL/min (based on SCr of 0.68 mg/dL).    Recent Labs   Lab 02/07/24  0537 02/08/24  0456 02/09/24  0459   PTP 34.3* 42.2* 36.3*   INR 3.32* 4.32* 3.58*            COVID-19 Lab Results    COVID-19  Lab Results   Component Value Date    COVID19 Not Detected 01/21/2024       Pro-Calcitonin  No results for input(s): \"PCT\" in the last 168 hours.    Cardiac  Recent Labs   Lab 02/09/24  0459   PBNP 8,616*       Creatinine Kinase  No results for input(s): \"CK\" in the last 168 hours.    Inflammatory Markers  No results for input(s): \"CRP\", \"GUSTAVO\", \"LDH\", \"DDIMER\" in the last 168 hours.    Imaging: Imaging data reviewed in Epic.    Medications:    sacubitril-valsartan  1 tablet Oral BID    amiodarone  200 mg Oral Daily    eplerenone  25 mg Oral Daily    anastrozole  1 mg Oral Daily    metoprolol succinate ER  50 mg Oral 2x Daily(Beta Blocker)    atorvastatin  40 mg Oral Nightly       Assessment & Plan:    Angeles Anand is a 78 year old female with PMH sig for paroxysmal A-fib, a flutter, CAD, history of CABG, hypertension, hyperlipidemia, SAVR, chronic thrombocytopenia, leukopenia 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     BLE swelling, sob 2/2 acute on chronic HFrEF  Elevated pBNP  -monitor on tele  -daily wts  -strict I/os  -ECHO reviewed from 1/22/24   -amio gtt >> po amio per cards  -diuresis per cards - 40 IV lasix BID  -cardiology consulted >> apprec recs  -hold ace - in anticipation of transitioning to entresto     Back pain - s/p fall  -prn pain control  -xray T and L spine shows T8 compression fracture >> neurosurg recommended MRI  -T spine mri  comp fx is not acute >> await further neurosurg recs >> brace to be placed  -PT/OT     Constipation w/ nausea and vomiting  -r/o obstruction >> xray obstructive series NEG for  obstruction  -aggressive bowel regimen inclu enema or suppository  -possibly nausea is related to amio gtt    Afib with RVR  Coagulapathy  Supratherapeutic INR  -inr supratherapeutic >> warfarin on hold (pharm to dose)  -monitor on tele  -cont BB, amio     CAD sp CABG w/ SACR  -cards following  -ECHO reviewed     HTN  -hold ace   -transition to entresto  -BB     Hypothyroidism  -synthroid  -check TSH     Quality:  DVT Prophylaxis: warfarin, scds  CODE status: full code  Shrestha: no  If COVID testing is negative, may discontinue isolation: yes      Plan of care discussed with patient and wife, all questions answered.      DISPO  Cont inpt     Elizabeth Freeman MD  [facility] Hospitalist  Pager [phone]  Answering Service number: [phone]            **Certification      PHYSICIAN Certification of Need for Inpatient Hospitalization - Initial Certification    Patient will require inpatient services that will reasonably be expected to span two midnight's based on the clinical documentation in H+P.   Based on patients current state of illness, I anticipate that, after discharge, patient will require TBD.

## 2024-02-09 NOTE — PROGRESS NOTES
[facility] Dosing Service  Warfarin (Coumadin) Subsequent Dosing    Angeles Anand is a 78 year old patient for whom pharmacy is dosing warfarin (Coumadin). Goal INR is 2-3    Recent Labs   Lab 02/04/24  1627 02/06/24  1818 02/07/24  0537 02/08/24  0456 02/09/24  0459   INR 3.45* 3.55* 3.32* 4.32* 3.58*       Consulted by:  Dr Freeman  Indication:  Afib  Potential Drug Interactions:  amiodarone, on PTA now started on amiodarone drip  Other Anticoagulants:  none  Home regimen (if applicable):  warfarin 2.5 mg at bedtime     Inpatient Dosing History:     Date 2/6 2/7 2/8 2/9       INR 3.55 3.32  4.32  3.58       Coumadin dose held hold  Hold                            Based on above -  1.  For today, Hold warfarin (COUMADIN) dose  2   PT/INR ordered daily while on warfarin  3.  Pharmacy will continue to follow.  We appreciate the opportunity to assist in the care of this patient.    Shanique Em, PharmD  2/9/2024  10:12 AM

## 2024-02-09 NOTE — PLAN OF CARE
Patient alert and oriented x 4. On RA. Up with SBA. Sinus yash/Afib on tele; flips in and out. Continent of bowel/bladder. No complaints of pain, shortness of breath, chest pain/discomfort. POC: HR control, TLSO brace to be delivered. Call light within reach. Fall precautions in place.     Problem: CARDIOVASCULAR - ADULT  Goal: Maintains optimal cardiac output and hemodynamic stability  Description: INTERVENTIONS:  - Monitor vital signs, rhythm, and trends  - Monitor for bleeding, hypotension and signs of decreased cardiac output  - Evaluate effectiveness of vasoactive medications to optimize hemodynamic stability  - Monitor arterial and/or venous puncture sites for bleeding and/or hematoma  - Assess quality of pulses, skin color and temperature  - Assess for signs of decreased coronary artery perfusion - ex. Angina  - Evaluate fluid balance, assess for edema, trend weights  Outcome: Progressing  Goal: Absence of cardiac arrhythmias or at baseline  Description: INTERVENTIONS:  - Continuous cardiac monitoring, monitor vital signs, obtain 12 lead EKG if indicated  - Evaluate effectiveness of antiarrhythmic and heart rate control medications as ordered  - Initiate emergency measures for life threatening arrhythmias  - Monitor electrolytes and administer replacement therapy as ordered  Outcome: Progressing     Problem: SAFETY ADULT - FALL  Goal: Free from fall injury  Description: INTERVENTIONS:  - Assess pt frequently for physical needs  - Identify cognitive and physical deficits and behaviors that affect risk of falls.  - Moore fall precautions as indicated by assessment.  - Educate pt/family on patient safety including physical limitations  - Instruct pt to call for assistance with activity based on assessment  - Modify environment to reduce risk of injury  - Provide assistive devices as appropriate  - Consider OT/PT consult to assist with strengthening/mobility  - Encourage toileting schedule  Outcome:  Progressing     Problem: RESPIRATORY - ADULT  Goal: Achieves optimal ventilation and oxygenation  Description: INTERVENTIONS:  - Assess for changes in respiratory status  - Assess for changes in mentation and behavior  - Position to facilitate oxygenation and minimize respiratory effort  - Oxygen supplementation based on oxygen saturation or ABGs  - Provide Smoking Cessation handout, if applicable  - Encourage broncho-pulmonary hygiene including cough, deep breathe, Incentive Spirometry  - Assess the need for suctioning and perform as needed  - Assess and instruct to report SOB or any respiratory difficulty  - Respiratory Therapy support as indicated  - Manage/alleviate anxiety  - Monitor for signs/symptoms of CO2 retention  Outcome: Progressing     Problem: PAIN - ADULT  Goal: Verbalizes/displays adequate comfort level or patient's stated pain goal  Description: INTERVENTIONS:  - Encourage pt to monitor pain and request assistance  - Assess pain using appropriate pain scale  - Administer analgesics based on type and severity of pain and evaluate response  - Implement non-pharmacological measures as appropriate and evaluate response  - Consider cultural and social influences on pain and pain management  - Manage/alleviate anxiety  - Utilize distraction and/or relaxation techniques  - Monitor for opioid side effects  - Notify MD/LIP if interventions unsuccessful or patient reports new pain  - Anticipate increased pain with activity and pre-medicate as appropriate  Outcome: Progressing     Problem: GASTROINTESTINAL - ADULT  Goal: Minimal or absence of nausea and vomiting  Description: INTERVENTIONS:  - Maintain adequate hydration with IV or PO as ordered and tolerated  - Nasogastric tube to low intermittent suction as ordered  - Evaluate effectiveness of ordered antiemetic medications  - Provide nonpharmacologic comfort measures as appropriate  - Advance diet as tolerated, if ordered  - Obtain nutritional consult as  needed  - Evaluate fluid balance  Outcome: Progressing

## 2024-02-09 NOTE — CM/SW NOTE
Met w/ pt to discuss PT recommendations for Home Health/ PT. Pt declines services for DC. Pt encouraged to reach out if she changes her mind before DC.    CM/SW will remain available for DC planning and/or support.     JAN Silva, CMSRN    [id number]

## 2024-02-09 NOTE — PHYSICAL THERAPY NOTE
PHYSICAL THERAPY TREATMENT NOTE - INPATIENT    Room Number: 8609/8609-A     Session: 1     Number of Visits to Meet Established Goals: 3    Presenting Problem: Afib, T8 compression fracture  Co-Morbidities : Afib, CAD, lymphedema, gout    ASSESSMENT   Patient demonstrates good  progress this session, goals  remain in progress.    Patient continues to function near baseline with bed mobility, transfers, gait, and standing prolonged periods.  Contributing factors to remaining limitations include decreased functional strength, decreased endurance/aerobic capacity, pain, and impaired standing balance.  Next session anticipate patient to progress bed mobility, transfers, gait, stair negotiation, and standing prolonged periods.  Physical Therapy will continue to follow patient for duration of hospitalization.    Patient continues to benefit from continued skilled PT services: at discharge to promote functional independence in home.  Anticipate patient will return home with home health PT.    PLAN  PT Treatment Plan: Bed mobility;Endurance;Energy conservation;Patient education;Gait training;Balance training;Transfer training;Stair training;Strengthening  Rehab Potential : Good  Frequency (Obs): 3-5x/week    CURRENT GOALS   Goal #1 Patient is able to demonstrate supine - sit EOB @ level: supervision      Goal #2 Patient is able to demonstrate transfers EOB to/from C at assistance level: supervision      Goal #3 Patient is able to ambulate 150 feet with assist device: walker - rolling at assistance level: supervision      Goal #4 Pt will ascend/descend 1 flight of stairs with supervision   Goal #5     Goal #6     Goal Comments: Goals established on 2/8/2024 2/9/2024 all goals ongoing    SUBJECTIVE  Pt states she is feeling a little better today and getting to side of bed \"went better today\" too.    OBJECTIVE  Precautions: Spine (TLSO for comfort)    WEIGHT BEARING RESTRICTION  Weight Bearing Restriction: None                 PAIN ASSESSMENT   Rating: 3  Location: back  Management Techniques: Activity promotion;Repositioning;Body mechanics    BALANCE                                                                                                                       Static Sitting: Good  Dynamic Sitting: Fair +           Static Standing: Fair -  Dynamic Standing: Fair -    ACTIVITY TOLERANCE           BP: 127/78  BP Location: Right arm  BP Method: Automatic  Patient Position: Lying    O2 WALK  Oxygen Therapy  SPO2% on Room Air at Rest: 92      AM-PAC '6-Clicks' INPATIENT SHORT FORM - BASIC MOBILITY  How much difficulty does the patient currently have...  Patient Difficulty: Turning over in bed (including adjusting bedclothes, sheets and blankets)?: A Little   Patient Difficulty: Sitting down on and standing up from a chair with arms (e.g., wheelchair, bedside commode, etc.): A Little   Patient Difficulty: Moving from lying on back to sitting on the side of the bed?: A Little   How much help from another person does the patient currently need...   Help from Another: Moving to and from a bed to a chair (including a wheelchair)?: A Little   Help from Another: Need to walk in hospital room?: A Little   Help from Another: Climbing 3-5 steps with a railing?: A Little       AM-PAC Score:  Raw Score: 18   Approx Degree of Impairment: 46.58%   Standardized Score (AM-PAC Scale): 43.63   CMS Modifier (G-Code): CK    FUNCTIONAL ABILITY STATUS  Gait Assessment   Functional Mobility/Gait Assessment  Gait Assistance: Contact guard assist;Supervision  Distance (ft): 140  Assistive Device: Rolling walker  Pattern:  (slow yanira, excessive kyphosis)    Skilled Therapy Provided    Bed Mobility:  Rolling: CGA   Supine<>Sit: CGA/min A   Sit<>Supine: NT     Transfer Mobility:  Sit<>Stand: CGA   Stand<>Sit: CGA   Gait: Pt ambulated 140' with RW and CGA that progressed to SBA/close supervision; cues for more upright posture.    Therapist's Comments: Pt  agreeable to PT. Reviewed logrolling technique during supine to sitting and pt reported less pain during transfer today. SpO2 91-96% during session      THERAPEUTIC EXERCISES  Lower Extremity Alternating marching  Ankle pumps  Hip AB/AD  LAQ     Upper Extremity      Position Sitting     Repetitions   10   Sets   1     Patient End of Session: Up in chair;Needs met;Call light within reach;RN aware of session/findings;All patient questions and concerns addressed;Alarm set    PT Session Time: 24 minutes  Gait Trainin minutes  Therapeutic Activity: 4 minutes  Therapeutic Exercise: 7 minutes

## 2024-02-09 NOTE — PROGRESS NOTES
[facility]  Neurosurgery Progress Note    Angeles Anand Patient Status:  Inpatient    3/6/1945 MRN [id number]   Location [facility] 8NE-A Attending Elizabeth Freeman MD   Hosp Day # 3 PCP Charlene Huang MD     Chief Complaint:  T8 compression fracture    Subjective:  Pt examined, reports pain controlled with Tylenol, but has not been out of bed as much as she was prior to admission. Waiting for TLSO brace. MRI identified old compression fx. No acute fractures    Objective/Physical Exam:    Vital Signs:  Blood pressure 109/77, pulse 101, temperature 97.7 °F (36.5 °C), temperature source Axillary, resp. rate 18, weight 120 lb 9.5 oz (54.7 kg), SpO2 94%, not currently breastfeeding.    Respiratory:  Respirations nonlabored    CV:  NSR on tele    General: NAD, Speech Fluent, Mood Appropriate    Neurologic: This patient is alert and orientated x 3.  Able to follow commands.  Face is symmetric. PERRLA +3 brisk, EOMI.  CN 2-12 GI,  Sensation to light touch is intact bilaterally.  Finger-to-nose coordination is intact.  No Pronator Drift.  Neg Corey's.  No clonus. Strength 5/5 in all extremities.  DTRs 2+ UE and LE.      Labs:  Lab Results   Component Value Date    CREATSERUM 0.68 2024    BUN 16 2024     2024    K 4.3 2024     2024    CO2 23.0 2024    GLU 89 2024    CA 8.6 2024    ALB 2.9 2024    ALKPHO 79 2024    BILT 1.2 2024    TP 6.0 2024     2024     2024    INR 3.58 2024    PTP 36.3 2024       Imaging:  MRI SPINE THORACIC (CPT=72146)    Result Date: 2024  CONCLUSION:  1. Anterior wedge deformity of T8 reflects old compression injury. 2. No acute compression fracture. 3. Bilateral pleural effusions. 4. Details as above.     Dictated by (CST): Dave Smith MD on 2024 at 9:11 PM     Finalized by (CST): Dave Smith MD on 2024 at 9:15 PM       XR ABDOMEN OBSTRUCTIVE SERIES  ROUTINE(2 VW)(CPT=74019)    Result Date: 2/8/2024  CONCLUSION:  See above.   LOCATION:  [place]    Dictated by (CST): Stromberg, LeRoy, MD on 2/08/2024 at 1:08 PM     Finalized by (CST): Stromberg, LeRoy, MD on 2/08/2024 at 1:11 PM          Assessment:  Chronic T8 compression fx    Plan:  No plans for surgery  Awaiting brace fitting   PT/OT    Dr. Esteves to follow with additional recommendations    KOSTA Gonzalez   [facility]  2/9/2024, 8:04 AM      A total of 30 minutes of visit time (exclusible of billable procedures) was administered.  > 50 % of time spent counseling/coordinating care     Is this a shared or split note between Advanced Practice Provider and Physician? Yes    Patient seen examined nurse practitioner  Case discussed  She is doing pretty well  She has some pain around her shoulder blade  She would like to try the brace  Her MRI showed an acute fracture but there is a fracture there  Will see how she does in a brace  Following

## 2024-02-09 NOTE — PLAN OF CARE
Assumed care of pt at 1930  Pt is A/Ox4, up w/ assistance.   Room air. NSR w/ 1st degree on tele. Pt denies chest pain.   Pt endorses 4/10 back pain. PRN Tylenol given.   Continent of bladder and bowel.   Fall precautions in place w/ spouse at bedside. Call light in reach. Pt and family updated on plan of care.     2130 - Report given to Divya GORDON.     Problem: CARDIOVASCULAR - ADULT  Goal: Maintains optimal cardiac output and hemodynamic stability  Description: INTERVENTIONS:  - Monitor vital signs, rhythm, and trends  - Monitor for bleeding, hypotension and signs of decreased cardiac output  - Evaluate effectiveness of vasoactive medications to optimize hemodynamic stability  - Monitor arterial and/or venous puncture sites for bleeding and/or hematoma  - Assess quality of pulses, skin color and temperature  - Assess for signs of decreased coronary artery perfusion - ex. Angina  - Evaluate fluid balance, assess for edema, trend weights  Outcome: Progressing  Goal: Absence of cardiac arrhythmias or at baseline  Description: INTERVENTIONS:  - Continuous cardiac monitoring, monitor vital signs, obtain 12 lead EKG if indicated  - Evaluate effectiveness of antiarrhythmic and heart rate control medications as ordered  - Initiate emergency measures for life threatening arrhythmias  - Monitor electrolytes and administer replacement therapy as ordered  Outcome: Progressing     Problem: SAFETY ADULT - FALL  Goal: Free from fall injury  Description: INTERVENTIONS:  - Assess pt frequently for physical needs  - Identify cognitive and physical deficits and behaviors that affect risk of falls.  - [place] fall precautions as indicated by assessment.  - Educate pt/family on patient safety including physical limitations  - Instruct pt to call for assistance with activity based on assessment  - Modify environment to reduce risk of injury  - Provide assistive devices as appropriate  - Consider OT/PT consult to assist with  strengthening/mobility  - Encourage toileting schedule  Outcome: Progressing     Problem: RESPIRATORY - ADULT  Goal: Achieves optimal ventilation and oxygenation  Description: INTERVENTIONS:  - Assess for changes in respiratory status  - Assess for changes in mentation and behavior  - Position to facilitate oxygenation and minimize respiratory effort  - Oxygen supplementation based on oxygen saturation or ABGs  - Provide Smoking Cessation handout, if applicable  - Encourage broncho-pulmonary hygiene including cough, deep breathe, Incentive Spirometry  - Assess the need for suctioning and perform as needed  - Assess and instruct to report SOB or any respiratory difficulty  - Respiratory Therapy support as indicated  - Manage/alleviate anxiety  - Monitor for signs/symptoms of CO2 retention  Outcome: Progressing     Problem: PAIN - ADULT  Goal: Verbalizes/displays adequate comfort level or patient's stated pain goal  Description: INTERVENTIONS:  - Encourage pt to monitor pain and request assistance  - Assess pain using appropriate pain scale  - Administer analgesics based on type and severity of pain and evaluate response  - Implement non-pharmacological measures as appropriate and evaluate response  - Consider cultural and social influences on pain and pain management  - Manage/alleviate anxiety  - Utilize distraction and/or relaxation techniques  - Monitor for opioid side effects  - Notify MD/LIP if interventions unsuccessful or patient reports new pain  - Anticipate increased pain with activity and pre-medicate as appropriate  Outcome: Progressing     Problem: GASTROINTESTINAL - ADULT  Goal: Minimal or absence of nausea and vomiting  Description: INTERVENTIONS:  - Maintain adequate hydration with IV or PO as ordered and tolerated  - Nasogastric tube to low intermittent suction as ordered  - Evaluate effectiveness of ordered antiemetic medications  - Provide nonpharmacologic comfort measures as appropriate  - Advance  diet as tolerated, if ordered  - Obtain nutritional consult as needed  - Evaluate fluid balance  Outcome: Progressing

## 2024-02-10 PROBLEM — S22.000A COMPRESSION FRACTURE OF BODY OF THORACIC VERTEBRA (HCC): Status: ACTIVE | Noted: 2024-02-10

## 2024-02-10 LAB
ALBUMIN SERPL-MCNC: 3.3 G/DL (ref 3.4–5)
ALBUMIN/GLOB SERPL: 1 {RATIO} (ref 1–2)
ALP LIVER SERPL-CCNC: 84 U/L
ALT SERPL-CCNC: 416 U/L
ANION GAP SERPL CALC-SCNC: 9 MMOL/L (ref 0–18)
AST SERPL-CCNC: 283 U/L (ref 15–37)
BILIRUB SERPL-MCNC: 1 MG/DL (ref 0.1–2)
BUN BLD-MCNC: 20 MG/DL (ref 9–23)
CALCIUM BLD-MCNC: 8.4 MG/DL (ref 8.5–10.1)
CHLORIDE SERPL-SCNC: 106 MMOL/L (ref 98–112)
CO2 SERPL-SCNC: 24 MMOL/L (ref 21–32)
CREAT BLD-MCNC: 1.18 MG/DL
EGFRCR SERPLBLD CKD-EPI 2021: 48 ML/MIN/1.73M2 (ref 60–?)
GLOBULIN PLAS-MCNC: 3.2 G/DL (ref 2.8–4.4)
GLUCOSE BLD-MCNC: 125 MG/DL (ref 70–99)
INR BLD: 2.99 (ref 0.8–1.2)
OSMOLALITY SERPL CALC.SUM OF ELEC: 292 MOSM/KG (ref 275–295)
POTASSIUM SERPL-SCNC: 3.9 MMOL/L (ref 3.5–5.1)
PROT SERPL-MCNC: 6.5 G/DL (ref 6.4–8.2)
PROTHROMBIN TIME: 31.4 SECONDS (ref 11.6–14.8)
SODIUM SERPL-SCNC: 139 MMOL/L (ref 136–145)

## 2024-02-10 PROCEDURE — 99231 SBSQ HOSP IP/OBS SF/LOW 25: CPT | Performed by: NEUROLOGICAL SURGERY

## 2024-02-10 RX ORDER — WARFARIN SODIUM 2 MG/1
2 TABLET ORAL
Status: COMPLETED | OUTPATIENT
Start: 2024-02-10 | End: 2024-02-10

## 2024-02-10 RX ORDER — METOPROLOL TARTRATE 50 MG/1
50 TABLET, FILM COATED ORAL
Status: DISCONTINUED | OUTPATIENT
Start: 2024-02-10 | End: 2024-02-12

## 2024-02-10 RX ORDER — ALBUMIN (HUMAN) 12.5 G/50ML
12.5 SOLUTION INTRAVENOUS ONCE
Status: COMPLETED | OUTPATIENT
Start: 2024-02-10 | End: 2024-02-10

## 2024-02-10 NOTE — PLAN OF CARE
Patient alert and oriented x 4. On 2L o2 support during night , after 0530 am  assisted to room air and tolerating ,o2sat 94%, Afib  on cardiac monitor. Beginning  shift SBP low 80's , house dr informed 250 ml saline flush given , [place] held  MORE orders obtained from APN Kacey ,FRANCISCO hose applied, Leg raised,  Albumin 12.5g ivp helped  bp ,110/87 in am. Patient more alert and comfortable , talking well, orthostatic v/s APN  aware ,Patient denies any chest pain or chest discomfort at this time. Patient voids, last BM 2/9. . Plan of care updated, all questions answered. Safety precautions in place. Bed alarm on. Will continue to monitor tele/labs/vital signs closely.     Plan: monitor INR level, ,cardiology to see adjust meds , daily weight , 2 kg loosed,   MONITOR RHYTHM , HR , BP ,LABS  NUTRITIOUS CONSULT ON    Problem: CARDIOVASCULAR - ADULT  Goal: Maintains optimal cardiac output and hemodynamic stability  Description: INTERVENTIONS:  - Monitor vital signs, rhythm, and trends  - Monitor for bleeding, hypotension and signs of decreased cardiac output  - Evaluate effectiveness of vasoactive medications to optimize hemodynamic stability  - Monitor arterial and/or venous puncture sites for bleeding and/or hematoma  - Assess quality of pulses, skin color and temperature  - Assess for signs of decreased coronary artery perfusion - ex. Angina  - Evaluate fluid balance, assess for edema, trend weights  Outcome: Progressing  Goal: Absence of cardiac arrhythmias or at baseline  Description: INTERVENTIONS:  - Continuous cardiac monitoring, monitor vital signs, obtain 12 lead EKG if indicated  - Evaluate effectiveness of antiarrhythmic and heart rate control medications as ordered  - Initiate emergency measures for life threatening arrhythmias  - Monitor electrolytes and administer replacement therapy as ordered  Outcome: Progressing     Problem: SAFETY ADULT - FALL  Goal: Free from fall injury  Description:  INTERVENTIONS:  - Assess pt frequently for physical needs  - Identify cognitive and physical deficits and behaviors that affect risk of falls.  - [place] fall precautions as indicated by assessment.  - Educate pt/family on patient safety including physical limitations  - Instruct pt to call for assistance with activity based on assessment  - Modify environment to reduce risk of injury  - Provide assistive devices as appropriate  - Consider OT/PT consult to assist with strengthening/mobility  - Encourage toileting schedule  Outcome: Progressing     Problem: RESPIRATORY - ADULT  Goal: Achieves optimal ventilation and oxygenation  Description: INTERVENTIONS:  - Assess for changes in respiratory status  - Assess for changes in mentation and behavior  - Position to facilitate oxygenation and minimize respiratory effort  - Oxygen supplementation based on oxygen saturation or ABGs  - Provide Smoking Cessation handout, if applicable  - Encourage broncho-pulmonary hygiene including cough, deep breathe, Incentive Spirometry  - Assess the need for suctioning and perform as needed  - Assess and instruct to report SOB or any respiratory difficulty  - Respiratory Therapy support as indicated  - Manage/alleviate anxiety  - Monitor for signs/symptoms of CO2 retention  Outcome: Progressing     Problem: PAIN - ADULT  Goal: Verbalizes/displays adequate comfort level or patient's stated pain goal  Description: INTERVENTIONS:  - Encourage pt to monitor pain and request assistance  - Assess pain using appropriate pain scale  - Administer analgesics based on type and severity of pain and evaluate response  - Implement non-pharmacological measures as appropriate and evaluate response  - Consider cultural and social influences on pain and pain management  - Manage/alleviate anxiety  - Utilize distraction and/or relaxation techniques  - Monitor for opioid side effects  - Notify MD/LIP if interventions unsuccessful or patient reports new  pain  - Anticipate increased pain with activity and pre-medicate as appropriate  Outcome: Progressing     Problem: GASTROINTESTINAL - ADULT  Goal: Minimal or absence of nausea and vomiting  Description: INTERVENTIONS:  - Maintain adequate hydration with IV or PO as ordered and tolerated  - Nasogastric tube to low intermittent suction as ordered  - Evaluate effectiveness of ordered antiemetic medications  - Provide nonpharmacologic comfort measures as appropriate  - Advance diet as tolerated, if ordered  - Obtain nutritional consult as needed  - Evaluate fluid balance  Outcome: Progressing     Problem: Patient/Family Goals  Goal: Patient/Family Long Term Goal  Description: Patient's Long Term Goal: discharge to home    Interventions:  - cardiology, neurology consult and follow orders lab,procedures , MRI, XRAY,pt ot , assist with TLSO Brace, monitor cardiac rhythm , rate, v/s, update status with patient , maintain proper v/s, adjust meds with cardiology, comfortable  - See additional Care Plan goals for specific interventions  Outcome: Progressing  Goal: Patient/Family Short Term Goal  Description: Patient's Short Term Goal: safety / fall precautions    Interventions:   - frequent monitor and assist,  ADL ,brp , pain  mgt , monitor v/s , labs , update plan of care  - See additional Care Plan goals for specific interventions  Outcome: Progressing

## 2024-02-10 NOTE — PLAN OF CARE
Received pt at shift change. AxOx4. Room air. Denies pain/SOB. Vitals stable. SBP in the 90s this am. Afib w rates in 100s-110s on tele.  See flowsheets for additional assessments.   Pt updated on POC. Call light within reach. All needs met at this time.     Plan:  -Monitor BP and HR    Problem: CARDIOVASCULAR - ADULT  Goal: Maintains optimal cardiac output and hemodynamic stability  Description: INTERVENTIONS:  - Monitor vital signs, rhythm, and trends  - Monitor for bleeding, hypotension and signs of decreased cardiac output  - Evaluate effectiveness of vasoactive medications to optimize hemodynamic stability  - Monitor arterial and/or venous puncture sites for bleeding and/or hematoma  - Assess quality of pulses, skin color and temperature  - Assess for signs of decreased coronary artery perfusion - ex. Angina  - Evaluate fluid balance, assess for edema, trend weights  Outcome: Progressing  Goal: Absence of cardiac arrhythmias or at baseline  Description: INTERVENTIONS:  - Continuous cardiac monitoring, monitor vital signs, obtain 12 lead EKG if indicated  - Evaluate effectiveness of antiarrhythmic and heart rate control medications as ordered  - Initiate emergency measures for life threatening arrhythmias  - Monitor electrolytes and administer replacement therapy as ordered  Outcome: Progressing     Problem: SAFETY ADULT - FALL  Goal: Free from fall injury  Description: INTERVENTIONS:  - Assess pt frequently for physical needs  - Identify cognitive and physical deficits and behaviors that affect risk of falls.  - [place] fall precautions as indicated by assessment.  - Educate pt/family on patient safety including physical limitations  - Instruct pt to call for assistance with activity based on assessment  - Modify environment to reduce risk of injury  - Provide assistive devices as appropriate  - Consider OT/PT consult to assist with strengthening/mobility  - Encourage toileting schedule  Outcome:  Progressing     Problem: RESPIRATORY - ADULT  Goal: Achieves optimal ventilation and oxygenation  Description: INTERVENTIONS:  - Assess for changes in respiratory status  - Assess for changes in mentation and behavior  - Position to facilitate oxygenation and minimize respiratory effort  - Oxygen supplementation based on oxygen saturation or ABGs  - Provide Smoking Cessation handout, if applicable  - Encourage broncho-pulmonary hygiene including cough, deep breathe, Incentive Spirometry  - Assess the need for suctioning and perform as needed  - Assess and instruct to report SOB or any respiratory difficulty  - Respiratory Therapy support as indicated  - Manage/alleviate anxiety  - Monitor for signs/symptoms of CO2 retention  Outcome: Progressing     Problem: PAIN - ADULT  Goal: Verbalizes/displays adequate comfort level or patient's stated pain goal  Description: INTERVENTIONS:  - Encourage pt to monitor pain and request assistance  - Assess pain using appropriate pain scale  - Administer analgesics based on type and severity of pain and evaluate response  - Implement non-pharmacological measures as appropriate and evaluate response  - Consider cultural and social influences on pain and pain management  - Manage/alleviate anxiety  - Utilize distraction and/or relaxation techniques  - Monitor for opioid side effects  - Notify MD/LIP if interventions unsuccessful or patient reports new pain  - Anticipate increased pain with activity and pre-medicate as appropriate  Outcome: Progressing     Problem: GASTROINTESTINAL - ADULT  Goal: Minimal or absence of nausea and vomiting  Description: INTERVENTIONS:  - Maintain adequate hydration with IV or PO as ordered and tolerated  - Nasogastric tube to low intermittent suction as ordered  - Evaluate effectiveness of ordered antiemetic medications  - Provide nonpharmacologic comfort measures as appropriate  - Advance diet as tolerated, if ordered  - Obtain nutritional consult as  needed  - Evaluate fluid balance  Outcome: Progressing     Problem: Patient/Family Goals  Goal: Patient/Family Long Term Goal  Description: Patient's Long Term Goal: discharge to home    Interventions:  - cardiology, neurology consult and follow orders lab,procedures , MRI, XRAY,pt ot , assist with TLSO Brace, monitor cardiac rhythm , rate, v/s, update status with patient , maintain proper v/s, adjust meds with cardiology, comfortable  - See additional Care Plan goals for specific interventions  Outcome: Progressing  Goal: Patient/Family Short Term Goal  Description: Patient's Short Term Goal: safety / fall precautions    Interventions:   - frequent monitor and assist,  ADL ,brp , pain  mgt , monitor v/s , labs , update plan of care  - See additional Care Plan goals for specific interventions  Outcome: Progressing

## 2024-02-10 NOTE — DIETARY NOTE
[facility]   CLINICAL NUTRITION    Angeles Anand     Admitting diagnosis:  Atrial fibrillation with rapid ventricular response (HCC) [I48.91]  Acute on chronic congestive heart failure, unspecified heart failure type (HCC) [I50.9]    Ht:  5' 4  Wt: 53.8 kg (118 lb 9.7 oz).   Body mass index is 20.36 kg/m².  IBW: 54.5kg    Wt Readings from Last 6 Encounters:   02/10/24 53.8 kg (118 lb 9.7 oz)   02/04/24 56.7 kg (125 lb)   01/21/24 56.7 kg (125 lb)   02/15/23 56.9 kg (125 lb 8 oz)   03/09/22 59 kg (130 lb)   02/02/22 59.9 kg (132 lb)        Labs/Meds reviewed    Diet:       Procedures    Cardiac diet Cardiac; Sodium Restriction: 2 GM NA; Is Patient on Accuchecks? No     Percent Meals Eaten (last 3 days)       Date/Time Percent Meals Eaten (%)    02/07/24 0840 100 %    02/07/24 1157 0 %    02/07/24 1800 0 %    02/09/24 0900 100 %    02/09/24 1240 100 %    02/09/24 1800 100 %    02/10/24 0835 100 %            78 year old female admit d/t Afib with RVR. PMH significant for Afib, hx CABG, CAD, HTN, HLD, Leukopenia, Chronic Thrombocytopenia.     Pt chart reviewed d/t consult to \"evaluate caloric intake and optimize nutrition\". Likely consult related to low albumin as noted in Cardiology note.   Albumin is a negative acute phase responder, and can be low due to critical illness, surgery, etc. Serum albumin is not necessarily a good indicator of nutritional status and is not an accurate diagnostic tool for malnutrition.       No significant weight changes noted.   Patient reports good appetite at this time. Nursing notes reports Percent Meals Eaten (%): 100 % intake for last meal.  Tolerating po diet without diarrhea, emesis, or constipation.   Will add Vanilla Ensure BID.     Patient is at low nutrition risk at this time.    Please consult if patient status changes or nutrition issues arise.    Alejandro Rothman, MS, RDN, LDN  Clinical Dietitian  [id number]

## 2024-02-10 NOTE — PROGRESS NOTES
02/10/24 0337 02/10/24 0338 02/10/24 0339   Vital Signs   Temp 98 °F (36.7 °C)  --   --    Temp src Oral  --   --    Pulse 104 111 119   Heart Rate Source Monitor Monitor Monitor   Cardiac Rhythm Afib Afib Afib   Resp 16 18 18   Respiratory Quality Normal Normal Normal   BP (!) 84/65 91/70 104/86   MAP (mmHg) 70 76 90   BP Location Right arm Right arm Right arm   BP Method Automatic Automatic Automatic   Patient Position Lying Sitting Standing   Oxygen Therapy   SpO2 97 % 94 % 94 %   O2 Device Nasal cannula Nasal cannula Nasal cannula

## 2024-02-10 NOTE — PROGRESS NOTES
[facility] Hospitalist Progress Note     Angeles Anand Patient Status:  Inpatient    3/6/1945 MRN [id number]   Location [facility] 8NE-A Attending Elizabeth Freeman MD   Hosp Day # 4 PCP Charlene Huang MD     Chief Complaint:   Fu Ble swelling, sob, afib    Subjective:     Patient seen and examined.   C/o nausea and vomiting since yest  No BM x 4 days  Down 8 pounds  Off o2 this morning (from 2L)  BP in the 80s/90s yest >> given  bolus>>  better this am  Back pain slightly better  Afebrile    Objective:    Review of Systems:   10 point ROS completed and was negative, except for pertinent positive and negatives stated in subjective.    Vital signs:  Temp:  [96.6 °F (35.9 °C)-98.1 °F (36.7 °C)] 98 °F (36.7 °C)  Pulse:  [] 107  Resp:  [16-20] 16  BP: ()/(56-98) 110/87  SpO2:  [90 %-98 %] 94 %    Physical Exam:    General: No acute distress.   HEENT:  EOMI, PERRLA, OP clear  Respiratory: Clear to auscultation bilaterally. No wheezes. No rhonchi.  Cardiovascular: S1, S2. Regular rate and rhythm. No murmurs.  Abdomen: Soft, nontender, nondistended.  Positive bowel sounds. No rebound or guarding.  Extremities: No edema.  Neuro:  Grossly non focal, no motor deficits.        Diagnostic Data:    Labs:  Recent Labs   Lab 24  16224  0536 24  0537 24  0456 24  0459 02/10/24  0502   WBC 5.1 6.6 7.4  --   --   --   --    HGB 12.3 13.0 12.7  --   --   --   --    MCV 90.3 87.9 83.4  --   --   --   --    .0 174.0 161.0  --   --   --   --    INR 3.45* 3.55*  --  3.32* 4.32* 3.58* 2.99*       Recent Labs   Lab 24  18124  0537 24  0456 24  1459 24  0459 02/10/24  0502   *   < > 135*  --  89 125*   BUN 19   < > 18  --  16 20   CREATSERUM 0.88   < > 0.96  --  0.68 1.18*   CA 9.1   < > 9.0  --  8.6 8.4*   ALB 3.6  --   --   --  2.9* 3.3*      < > 139  --  141 139   K 4.1   < > 3.4*   3.4* 5.0 4.3 3.9      < > 106  --  111 106   CO2 26.0   < > 25.0  --  23.0 24.0   ALKPHO 77  --   --   --  79 84   AST 36  --   --   --  440* 283*   ALT 49  --   --   --  440* 416*   BILT 0.8  --   --   --  1.2 1.0   TP 7.1  --   --   --  6.0* 6.5    < > = values in this interval not displayed.       Estimated Creatinine Clearance: 33.9 mL/min (A) (based on SCr of 1.18 mg/dL (H)).    Recent Labs   Lab 02/08/24  0456 02/09/24  0459 02/10/24  0502   PTP 42.2* 36.3* 31.4*   INR 4.32* 3.58* 2.99*            COVID-19 Lab Results    COVID-19  Lab Results   Component Value Date    COVID19 Not Detected 01/21/2024       Pro-Calcitonin  No results for input(s): \"PCT\" in the last 168 hours.    Cardiac  Recent Labs   Lab 02/09/24  0459   PBNP 8,616*       Creatinine Kinase  No results for input(s): \"CK\" in the last 168 hours.    Inflammatory Markers  No results for input(s): \"CRP\", \"GUSTAVO\", \"LDH\", \"DDIMER\" in the last 168 hours.    Imaging: Imaging data reviewed in Epic.    Medications:    amiodarone  200 mg Oral Daily    torsemide  20 mg Oral Daily    sacubitril-valsartan  1 tablet Oral BID    eplerenone  25 mg Oral Daily    anastrozole  1 mg Oral Daily    metoprolol succinate ER  50 mg Oral 2x Daily(Beta Blocker)       Assessment & Plan:    Angeles Anand is a 78 year old female with PMH sig for paroxysmal A-fib, a flutter, CAD, history of CABG, hypertension, hyperlipidemia, SAVR, chronic thrombocytopenia, leukopenia 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     BLE swelling, sob 2/2 acute on chronic HFrEF  Elevated pBNP  -monitor on tele  -daily wts  -strict I/os  -ECHO reviewed from 1/22/24   -amio gtt >> po amio per cards  -diuresis per cards - daily torsemide >> holding due to BREANN and hypotension  -cardiology consulted >> apprec recs  -hold ace - in anticipation of transitioning to entresto     AKInjury - likely 2/2 overdiuresis  -creatinine  1.18 this am   -would recommend dec diuretic  -may need nephro to assess  -albumin and 250cc NS bolus given yest  -repeat labs in the morning    Back pain - s/p fall  -prn pain control  -xray T and L spine shows T8 compression fracture >> neurosurg recommended MRI  -T spine mri  comp fx is not acute >> await further neurosurg recs >> brace to be placed  -PT/OT     Constipation w/ nausea and vomiting  -r/o obstruction >> xray obstructive series NEG for obstruction  -aggressive bowel regimen inclu enema or suppository  -possibly nausea is related to amio gtt    Afib with RVR  Coagulapathy  Supratherapeutic INR  -inr supratherapeutic >> warfarin on hold (pharm to dose)  -monitor on tele  -cont BB, amio     CAD sp CABG w/ SACR  -cards following  -ECHO reviewed     HTN  -hold ace   -transition to entresto  -BB     Hypothyroidism  -synthroid  -check TSH     Quality:  DVT Prophylaxis: warfarin, scds  CODE status: full code  Shrestha: no  If COVID testing is negative, may discontinue isolation: yes      Plan of care discussed with patient and wife, all questions answered.      DISPO  Cont inpt   concerned about needing ablation     Elizabeth Freeman MD  [facility] Hospitalist  Pager [phone]  Answering Service number: [phone]            **Certification      PHYSICIAN Certification of Need for Inpatient Hospitalization - Initial Certification    Patient will require inpatient services that will reasonably be expected to span two midnight's based on the clinical documentation in H+P.   Based on patients current state of illness, I anticipate that, after discharge, patient will require TBD.

## 2024-02-10 NOTE — PROGRESS NOTES
[facility]    Neurosurgery Progress Note  2/10/2024    Angeles Anand Patient Status:  Inpatient    3/6/1945 MRN [id number]   Formerly [facility] 8NE-A Attending Elizabeth Freeman MD   Hosp Day # 4 PCP Charlene Huang MD     SUBJECTIVE:  Angeles Anand is a(n) 78 year old female with thoracic fracture.  Denies any pain. Brace at bedside.  Patient reports improvement in pain with brace.       OBJECTIVE / PHYSICAL EXAM:  Vital Signs:  Blood pressure 110/87, pulse 107, temperature 98 °F (36.7 °C), temperature source Oral, resp. rate 16, weight 118 lb 9.7 oz (53.8 kg), SpO2 94%, not currently breastfeeding.      Neuro: stable      Lab Results (last 24 hours):  Recent Results (from the past 24 hour(s))   Comp Metabolic Panel (14)    Collection Time: 02/10/24  5:02 AM   Result Value Ref Range    Glucose 125 (H) 70 - 99 mg/dL    Sodium 139 136 - 145 mmol/L    Potassium 3.9 3.5 - 5.1 mmol/L    Chloride 106 98 - 112 mmol/L    CO2 24.0 21.0 - 32.0 mmol/L    Anion Gap 9 0 - 18 mmol/L    BUN 20 9 - 23 mg/dL    Creatinine 1.18 (H) 0.55 - 1.02 mg/dL    Calcium, Total 8.4 (L) 8.5 - 10.1 mg/dL    Calculated Osmolality 292 275 - 295 mOsm/kg    eGFR-Cr 48 (L) >=60 mL/min/1.73m2     (H) 15 - 37 U/L     (H) 13 - 56 U/L    Alkaline Phosphatase 84 55 - 142 U/L    Bilirubin, Total 1.0 0.1 - 2.0 mg/dL    Total Protein 6.5 6.4 - 8.2 g/dL    Albumin 3.3 (L) 3.4 - 5.0 g/dL    Globulin  3.2 2.8 - 4.4 g/dL    A/G Ratio 1.0 1.0 - 2.0   Prothrombin Time (PT)    Collection Time: 02/10/24  5:02 AM   Result Value Ref Range    PT 31.4 (H) 11.6 - 14.8 seconds    INR 2.99 (H) 0.80 - 1.20       Review of Imaging  none    Assessment/Plan:  Thoracic compression fracture   Brace when ambulating  F/U with Dr. Esteves in 4-6 weeks with XR  Ok to discharge home    Mg Jessica DO    2/10/2024  8:04 AM

## 2024-02-10 NOTE — CONSULTS
[facility] Pharmacy Dosing Service  Warfarin (Coumadin) Subsequent Dosing    Angeles Anand is a 78 year old patient for whom pharmacy is dosing warfarin (Coumadin). Goal INR is 2-3    Recent Labs   Lab 02/06/24  1818 02/07/24  0537 02/08/24  0456 02/09/24  0459 02/10/24  0502   INR 3.55* 3.32* 4.32* 3.58* 2.99*       Consulted by:  Dr Freeman  Indication:  Afib  Potential Drug Interactions:  amiodarone  Other Anticoagulants:  none  Home regimen (if applicable):  warfarin 2.5 mg at bedtime      Inpatient Dosing History:      Date 2/6 2/7  2/8  2/9  2/10     INR 3.55 3.32  4.32  3.58  2.99     Coumadin dose held hold  Hold  Hold                              Based on above -  1.  For today, Give warfarin (COUMADIN) 2 mg at 2100 tonight  2   PT/INR ordered daily while on warfarin  3.  Pharmacy will continue to follow.  We appreciate the opportunity to assist in the care of this patient.    Annalisa Molina, PharmD  2/10/2024  2:19 PM

## 2024-02-10 NOTE — PROGRESS NOTES
Progress Note  Angeles Anand Patient Status:  Inpatient    3/6/1945 MRN [id number]   Location [facility] 8NE-A Attending Elizabeth Freeman MD   Hosp Day # 4 PCP Charlene Huang MD     Subjective:  Mrs. nAand reports fatigue, but denies acute symptoms.  Episode of hypotension last night with stable BP's following albumin and 250 ccs of fluid last night.  Rates in the low 100's    Objective:  Physical Exam:   BP 99/54 (BP Location: Right arm)   Pulse 104   Temp 97.9 °F (36.6 °C) (Oral)   Resp 17   Wt 118 lb 9.7 oz (53.8 kg)   SpO2 95%   BMI 20.36 kg/m²   Temp (24hrs), Av.7 °F (36.5 °C), Min:96.6 °F (35.9 °C), Max:98 °F (36.7 °C)       Intake/Output Summary (Last 24 hours) at 2/10/2024 1016  Last data filed at 2/10/2024 0835  Gross per 24 hour   Intake 808 ml   Output 350 ml   Net 458 ml     Wt Readings from Last 3 Encounters:   02/10/24 118 lb 9.7 oz (53.8 kg)   24 125 lb (56.7 kg)   24 125 lb (56.7 kg)     Telemetry: afib with ventricular rates in the 100's  General: Chronically ill and thin appearing female who is alert and oriented in no apparent distress.  HEENT: No focal deficits.  Neck: No JVD, carotids 2+ no bruits.  Cardiac: Irregularly irregular, S1, S2 normal, no murmur, rub or gallop.  Lungs: Clear without wheezes, rales, rhonchi or dullness.  Normal excursions and effort.  Abdomen: Soft, non-tender.   Extremities: Without clubbing, cyanosis or edema.  Peripheral pulses are 2+.  Neurologic: Alert and oriented, normal affect.  Skin: Warm and dry.        Intake/Output:    Intake/Output Summary (Last 24 hours) at 2/10/2024 1016  Last data filed at 2/10/2024 0835  Gross per 24 hour   Intake 808 ml   Output 350 ml   Net 458 ml       Last 3 Weights   02/10/24 0407 118 lb 9.7 oz (53.8 kg)   24 06 120 lb 9.5 oz (54.7 kg)   24 06 120 lb 9.5 oz (54.7 kg)   24 0515 123 lb 3.8 oz (55.9 kg)   24 126 lb 5.2 oz (57.3 kg)   24 1522 125 lb (56.7 kg)    01/21/24 1050 125 lb (56.7 kg)   01/21/24 0650 125 lb (56.7 kg)       Labs:  Recent Labs   Lab 02/08/24  0456 02/08/24  1459 02/09/24  0459 02/10/24  0502   *  --  89 125*   BUN 18  --  16 20   CREATSERUM 0.96  --  0.68 1.18*   EGFRCR 61  --  90 48*   CA 9.0  --  8.6 8.4*     --  141 139   K 3.4*  3.4* 5.0 4.3 3.9     --  111 106   CO2 25.0  --  23.0 24.0     Recent Labs   Lab 02/04/24  1627 02/06/24  1818 02/07/24  0536   RBC 4.31 4.64 4.53   HGB 12.3 13.0 12.7   HCT 38.9 40.8 37.8   MCV 90.3 87.9 83.4   MCH 28.5 28.0 28.0   MCHC 31.6 31.9 33.6   RDW 13.4 13.4 13.4   NEPRELIM 2.04 3.19 2.92   WBC 5.1 6.6 7.4   .0 174.0 161.0         Recent Labs   Lab 02/06/24  1818   TROPHS 16       Diagnostics:               Medications:   metoprolol tartrate  50 mg Oral 2x Daily(Beta Blocker)    amiodarone  200 mg Oral Daily    [Held by provider] torsemide  20 mg Oral Daily    sacubitril-valsartan  1 tablet Oral BID    eplerenone  25 mg Oral Daily    anastrozole  1 mg Oral Daily         Assessment/Plan:  Paroxysmal afib/flutter  Continue PO amiodarone  Adjust to metoprolol tartrate Warfarin-supratherapeutic INR at presentation trending down- pharmacy dosing  Acute on chronic HFrEF  Entresto, inspra, BB (tartrate for now for improved fib rate control).  Albumin and 250 ml bolus yesterday for hypotension  Torsemide held - currently compensated/dry appearing  Hx of CAD s/p CABG  No chest pain or dynamic ECG changes  HTN  At goal on CHF GDMT  HLD  OP statin  T8 compression Fx  Neurosurgery following  Elevated LFTs  Improving after adjust to PO amiodarone  Continue to follow  PLAN   Continue po amiodarone 200 mg daily, LFTs improving on lower dose  Will switch back to  metoprolol tartrate 50 mg BID to allow for better titrate of rate control meds  Hold Entresto while inpatient for hypotension overnight  Monitor volume status closely - home torsemide held  Therapeutic INR this AM - pharmacy dosing  Coumadin        Guanakito Wei PA-C  2/10/2024  10:16 AM     Patient seen and examined independently.  Note reviewed and labs reviewed.  Agree with above assessment and plan as ammended.

## 2024-02-11 LAB
ANION GAP SERPL CALC-SCNC: 6 MMOL/L (ref 0–18)
BUN BLD-MCNC: 18 MG/DL (ref 9–23)
CALCIUM BLD-MCNC: 8.7 MG/DL (ref 8.5–10.1)
CHLORIDE SERPL-SCNC: 104 MMOL/L (ref 98–112)
CO2 SERPL-SCNC: 29 MMOL/L (ref 21–32)
CREAT BLD-MCNC: 0.9 MG/DL
EGFRCR SERPLBLD CKD-EPI 2021: 66 ML/MIN/1.73M2 (ref 60–?)
GLUCOSE BLD-MCNC: 94 MG/DL (ref 70–99)
INR BLD: 2.27 (ref 0.8–1.2)
OSMOLALITY SERPL CALC.SUM OF ELEC: 290 MOSM/KG (ref 275–295)
POTASSIUM SERPL-SCNC: 4.1 MMOL/L (ref 3.5–5.1)
PROTHROMBIN TIME: 25.3 SECONDS (ref 11.6–14.8)
SODIUM SERPL-SCNC: 139 MMOL/L (ref 136–145)

## 2024-02-11 RX ORDER — METOPROLOL TARTRATE 50 MG/1
50 TABLET, FILM COATED ORAL
Qty: 60 TABLET | Refills: 0 | Status: SHIPPED | OUTPATIENT
Start: 2024-02-11

## 2024-02-11 RX ORDER — WARFARIN SODIUM 2 MG/1
2 TABLET ORAL
Status: COMPLETED | OUTPATIENT
Start: 2024-02-11 | End: 2024-02-11

## 2024-02-11 RX ORDER — WARFARIN SODIUM 2 MG/1
2 TABLET ORAL DAILY
Qty: 30 TABLET | Refills: 0 | Status: SHIPPED | OUTPATIENT
Start: 2024-02-11 | End: 2024-02-12

## 2024-02-11 RX ORDER — LOSARTAN POTASSIUM 25 MG/1
25 TABLET ORAL DAILY
Qty: 30 TABLET | Refills: 0 | Status: SHIPPED | OUTPATIENT
Start: 2024-02-11 | End: 2024-02-12

## 2024-02-11 RX ORDER — EPLERENONE 25 MG/1
25 TABLET, FILM COATED ORAL DAILY
Qty: 30 TABLET | Refills: 0 | Status: SHIPPED | OUTPATIENT
Start: 2024-02-11

## 2024-02-11 NOTE — PROGRESS NOTES
02/11/24 1203 02/11/24 1205 02/11/24 1209   Vital Signs   Temp 97.5 °F (36.4 °C)  --   --    Temp src Oral  --   --    Pulse 85 88 94   Heart Rate Source Monitor Monitor Monitor   Resp 18 18 20   Respiratory Quality Normal Normal Normal   /55 102/57 93/79   MAP (mmHg) 65 66 82   BP Location Right arm  (forearm) Right arm Right arm   BP Method Automatic Automatic Automatic   Patient Position Lying Sitting Standing   Oxygen Therapy   SpO2 94 % 92 % 97 %   O2 Device None (Room air) None (Room air) None (Room air)   Pulse Oximetry Type Continuous Continuous Continuous   Oximetry Probe Site Changed No No No   Pulse Ox Probe Location Right hand Right hand Right hand

## 2024-02-11 NOTE — PROGRESS NOTES
[facility] Hospitalist Progress Note     Angeles Anand Patient Status:  Inpatient    3/6/1945 MRN [id number]   Location [facility] 8NE-A Attending Elizabeth Freeman MD   Hosp Day # 5 PCP Charlene Huang MD     Chief Complaint:   Fu Ble swelling, sob, afib    Subjective:     Patient seen and examined.   Multiple BM - feels fine  Some LH eric while standing and walking  Down 9 pounds  Off o2  BP in the 80s/90s this am  Back pain much better  Afebrile    Objective:    Review of Systems:   10 point ROS completed and was negative, except for pertinent positive and negatives stated in subjective.    Vital signs:  Temp:  [97.5 °F (36.4 °C)-98.3 °F (36.8 °C)] 98.3 °F (36.8 °C)  Pulse:  [] 102  Resp:  [16-19] 16  BP: ()/(54-72) 112/65  SpO2:  [91 %-95 %] 91 %    Physical Exam:    General: No acute distress.   HEENT:  EOMI, PERRLA, OP clear  Respiratory: Clear to auscultation bilaterally. No wheezes. No rhonchi.  Cardiovascular: S1, S2. Regular rate and rhythm. No murmurs.  Abdomen: Soft, nontender, nondistended.  Positive bowel sounds. No rebound or guarding.  Extremities: No edema.  Neuro:  Grossly non focal, no motor deficits.        Diagnostic Data:    Labs:  Recent Labs   Lab 24  1627 24  1818 24  0536 24  0537 24  0456 24  0459 02/10/24  0502 24  0517   WBC 5.1 6.6 7.4  --   --   --   --   --    HGB 12.3 13.0 12.7  --   --   --   --   --    MCV 90.3 87.9 83.4  --   --   --   --   --    .0 174.0 161.0  --   --   --   --   --    INR 3.45* 3.55*  --  3.32* 4.32* 3.58* 2.99* 2.27*       Recent Labs   Lab 24  1818 24  0537 24  0456 24  1459 24  0459 02/10/24  0502   *   < > 135*  --  89 125*   BUN 19   < > 18  --  16 20   CREATSERUM 0.88   < > 0.96  --  0.68 1.18*   CA 9.1   < > 9.0  --  8.6 8.4*   ALB 3.6  --   --   --  2.9* 3.3*      < > 139  --  141 139   K 4.1   < > 3.4*  3.4* 5.0  4.3 3.9      < > 106  --  111 106   CO2 26.0   < > 25.0  --  23.0 24.0   ALKPHO 77  --   --   --  79 84   AST 36  --   --   --  440* 283*   ALT 49  --   --   --  440* 416*   BILT 0.8  --   --   --  1.2 1.0   TP 7.1  --   --   --  6.0* 6.5    < > = values in this interval not displayed.       Estimated Creatinine Clearance: 33.7 mL/min (A) (based on SCr of 1.18 mg/dL (H)).    Recent Labs   Lab 02/09/24  0459 02/10/24  0502 02/11/24  0517   PTP 36.3* 31.4* 25.3*   INR 3.58* 2.99* 2.27*            COVID-19 Lab Results    COVID-19  Lab Results   Component Value Date    COVID19 Not Detected 01/21/2024       Pro-Calcitonin  No results for input(s): \"PCT\" in the last 168 hours.    Cardiac  Recent Labs   Lab 02/09/24  0459   PBNP 8,616*       Creatinine Kinase  No results for input(s): \"CK\" in the last 168 hours.    Inflammatory Markers  No results for input(s): \"CRP\", \"GUSTAVO\", \"LDH\", \"DDIMER\" in the last 168 hours.    Imaging: Imaging data reviewed in Epic.    Medications:    metoprolol tartrate  50 mg Oral 2x Daily(Beta Blocker)    amiodarone  200 mg Oral Daily    [Held by provider] torsemide  20 mg Oral Daily    [Held by provider] sacubitril-valsartan  1 tablet Oral BID    eplerenone  25 mg Oral Daily    anastrozole  1 mg Oral Daily       Assessment & Plan:    Angeles Anand is a 78 year old female with PMH sig for paroxysmal A-fib, a flutter, CAD, history of CABG, hypertension, hyperlipidemia, SAVR, chronic thrombocytopenia, leukopenia 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     BLE swelling, sob 2/2 acute on chronic HFrEF  Elevated pBNP  -monitor on tele  -daily wts  -strict I/os  -ECHO reviewed from 1/22/24   -amio gtt >> po amio per cards  -diuresis per cards - daily torsemide >> holding due to BREANN and hypotension  -cardiology consulted >> apprec recs  -hold ace - in anticipation of transitioning to entresto     AKInjury - likely 2/2  overdiuresis  -creatinine 1.18  >> diuretics held >> repeat labs show improved renal function  -albumin and 250cc NS bolus given yest  -consider nephro assessment    Back pain - s/p fall  -prn pain control  -xray T and L spine shows T8 compression fracture >> neurosurg recommended MRI  -T spine mri  comp fx is not acute >> await further neurosurg recs >> brace to be placed  -PT/OT - recs HH PT/OT >> ppt declines     Constipation w/ nausea and vomiting  -r/o obstruction >> xray obstructive series NEG for obstruction  -aggressive bowel regimen inclu enema or suppository  -possibly nausea is related to amio gtt    Afib with RVR  Coagulapathy  Supratherapeutic INR  -inr supratherapeutic >> warfarin on hold (pharm to dose)  -monitor on tele  -cont BB, amio     CAD sp CABG w/ SACR  -cards following  -ECHO reviewed     HTN  -hold ace   -transition to entresto  -BB     Hypothyroidism  -synthroid     Quality:  DVT Prophylaxis: warfarin, scds  CODE status: full code  Shrestha: no  If COVID testing is negative, may discontinue isolation: yes      Plan of care discussed with patient and wife, all questions answered.      DISPO  DC planning in process  Likely DC tomorrow as BP is in the 80s/90s laying down and ot LH  Check orthostatic vitals  Monitor >> may need gentle hydration        Elizabeth Freeman MD  [facility] Hospitalist  Pager [phone]  Answering Service number: [phone]            **Certification      PHYSICIAN Certification of Need for Inpatient Hospitalization - Initial Certification    Patient will require inpatient services that will reasonably be expected to span two midnight's based on the clinical documentation in H+P.   Based on patients current state of illness, I anticipate that, after discharge, patient will require TBD.

## 2024-02-11 NOTE — PROGRESS NOTES
Progress Note  Angeles Anand Patient Status:  Inpatient    3/6/1945 MRN [id number]   Location [facility] 8NE-A Attending Elizabeth Freeman MD   Hosp Day # 5 PCP Charlene Huang MD     Subjective:  Mrs. Anand feels \"fine\" today. She denies chest pain or palpitations.  Ambulated with PT and felt a bit \"light headed\" towards the end but not dizzy or pre syncopal.   Bps have been soft this morning in the 90's systolic.    Objective:  Physical Exam:   BP 96/64 (BP Location: Right arm)   Pulse 88   Temp 97.7 °F (36.5 °C) (Oral)   Resp 18   Wt 117 lb 12.8 oz (53.4 kg)   SpO2 91%   BMI 20.22 kg/m²   Temp (24hrs), Av.9 °F (36.6 °C), Min:97.5 °F (36.4 °C), Max:98.3 °F (36.8 °C)       Intake/Output Summary (Last 24 hours) at 2024 0936  Last data filed at 2024 0505  Gross per 24 hour   Intake 580 ml   Output 1400 ml   Net -820 ml     Wt Readings from Last 3 Encounters:   24 117 lb 12.8 oz (53.4 kg)   24 125 lb (56.7 kg)   24 125 lb (56.7 kg)     Telemetry: afib in the 80's  General: Chronically ill and thin appearing female who is alert and oriented in no apparent distress. Sit's to stands with minimal assistance transition to bedside chair denying dizziness.  HEENT: No focal deficits.  Neck: No JVD, carotids 2+ no bruits.  Cardiac: Irregularly irregular, S1, S2 normal, no murmur, rub or gallop.  Lungs: Clear without wheezes, rales, rhonchi or dullness.  Normal excursions and effort.  Abdomen: Soft, non-tender.   Extremities: Without clubbing, cyanosis or edema.  Peripheral pulses are 2+.  Neurologic: Alert and oriented, normal affect.  Skin: Warm and dry.        Intake/Output:    Intake/Output Summary (Last 24 hours) at 2024 0936  Last data filed at 2024 0505  Gross per 24 hour   Intake 580 ml   Output 1400 ml   Net -820 ml       Last 3 Weights   24 0502 117 lb 12.8 oz (53.4 kg)   02/10/24 0407 118 lb 9.7 oz (53.8 kg)   24 0612 120 lb 9.5 oz (54.7 kg)    02/09/24 0611 120 lb 9.5 oz (54.7 kg)   02/08/24 0515 123 lb 3.8 oz (55.9 kg)   02/06/24 2032 126 lb 5.2 oz (57.3 kg)   02/04/24 1522 125 lb (56.7 kg)   01/21/24 1050 125 lb (56.7 kg)   01/21/24 0650 125 lb (56.7 kg)       Labs:  Recent Labs   Lab 02/09/24  0459 02/10/24  0502 02/11/24  0757   GLU 89 125* 94   BUN 16 20 18   CREATSERUM 0.68 1.18* 0.90   EGFRCR 90 48* 66   CA 8.6 8.4* 8.7    139 139   K 4.3 3.9 4.1    106 104   CO2 23.0 24.0 29.0     Recent Labs   Lab 02/04/24  1627 02/06/24  1818 02/07/24  0536   RBC 4.31 4.64 4.53   HGB 12.3 13.0 12.7   HCT 38.9 40.8 37.8   MCV 90.3 87.9 83.4   MCH 28.5 28.0 28.0   MCHC 31.6 31.9 33.6   RDW 13.4 13.4 13.4   NEPRELIM 2.04 3.19 2.92   WBC 5.1 6.6 7.4   .0 174.0 161.0         Recent Labs   Lab 02/06/24  1818   TROPHS 16       Diagnostics:               Medications:   metoprolol tartrate  50 mg Oral 2x Daily(Beta Blocker)    amiodarone  200 mg Oral Daily    [Held by provider] torsemide  20 mg Oral Daily    [Held by provider] sacubitril-valsartan  1 tablet Oral BID    eplerenone  25 mg Oral Daily    anastrozole  1 mg Oral Daily         Assessment/Plan:  Paroxysmal afib/flutter  Continue PO amiodarone  Adjusted to metoprolol tartrate- improved rates  Warfarin-supratherapeutic INR at presentation trending down- pharmacy dosing  Acute on chronic HFrEF  Inspra and BB (tartrate for rate control for now).    Losartan 25 mg daily at discharge  Torsemide held - currently compensated/dry appearing  Hx of CAD s/p CABG  No chest pain or dynamic ECG changes  HTN  At goal on CHF GDMT  HLD  OP statin  T8 compression Fx  Neurosurgery following  Elevated LFTs  Improving  PLAN   Continue po amiodarone 200 mg daily- monitor LFT's  Maintain metoprolol tartrate 50 mg BID to allow for better ventricular rate control for now  Hold Entresto hypotension- Losartan 25 mg daily at discharge  No OP diuretics until follow up in 1 week  Therapeutic INR this AM   I have contacted  our warfarin clinic with plan for repeat INR Wednesday 2/14/2024  If Bps are stable and she is asymptomatic it would be reasonable for her to discharge from a cardiology perspective.  If symptomatic hypotension/orthostatic gentle rehydration could be considered  Discussed in detail with Dr. Georges who saw the patient this morning.    Guanakito Wei PA-C  2/11/2024  09:39 AM     Patient seen and examined independently.  Note reviewed and labs reviewed.  Agree with above assessment and plan.  Will stop entresto due to hypotension.  Start low dose losartan instead.  No objection to discharge home.  LFT improving on low dose amiodarone.    MELISSA Georges MD

## 2024-02-11 NOTE — PLAN OF CARE
Patient alert and oriented x 4. On room air. sinus on cardiac monitor. Patient denies any chest pain or chest discomfort at this time. Patient voids, last BM 2/10. She is getting better today and more alert  , no oxygen, bp stable now, discharge plan today , no acute distress noted, Plan of care updated, all questions answered. Safety precautions in place. Bed alarm on. Will continue to monitor tele/labs/vital signs closely.       Problem: CARDIOVASCULAR - ADULT  Goal: Maintains optimal cardiac output and hemodynamic stability  Description: INTERVENTIONS:  - Monitor vital signs, rhythm, and trends  - Monitor for bleeding, hypotension and signs of decreased cardiac output  - Evaluate effectiveness of vasoactive medications to optimize hemodynamic stability  - Monitor arterial and/or venous puncture sites for bleeding and/or hematoma  - Assess quality of pulses, skin color and temperature  - Assess for signs of decreased coronary artery perfusion - ex. Angina  - Evaluate fluid balance, assess for edema, trend weights  Outcome: Progressing  Goal: Absence of cardiac arrhythmias or at baseline  Description: INTERVENTIONS:  - Continuous cardiac monitoring, monitor vital signs, obtain 12 lead EKG if indicated  - Evaluate effectiveness of antiarrhythmic and heart rate control medications as ordered  - Initiate emergency measures for life threatening arrhythmias  - Monitor electrolytes and administer replacement therapy as ordered  Outcome: Progressing     Problem: SAFETY ADULT - FALL  Goal: Free from fall injury  Description: INTERVENTIONS:  - Assess pt frequently for physical needs  - Identify cognitive and physical deficits and behaviors that affect risk of falls.  - Wall fall precautions as indicated by assessment.  - Educate pt/family on patient safety including physical limitations  - Instruct pt to call for assistance with activity based on assessment  - Modify environment to reduce risk of injury  - Provide  assistive devices as appropriate  - Consider OT/PT consult to assist with strengthening/mobility  - Encourage toileting schedule  Outcome: Progressing     Problem: RESPIRATORY - ADULT  Goal: Achieves optimal ventilation and oxygenation  Description: INTERVENTIONS:  - Assess for changes in respiratory status  - Assess for changes in mentation and behavior  - Position to facilitate oxygenation and minimize respiratory effort  - Oxygen supplementation based on oxygen saturation or ABGs  - Provide Smoking Cessation handout, if applicable  - Encourage broncho-pulmonary hygiene including cough, deep breathe, Incentive Spirometry  - Assess the need for suctioning and perform as needed  - Assess and instruct to report SOB or any respiratory difficulty  - Respiratory Therapy support as indicated  - Manage/alleviate anxiety  - Monitor for signs/symptoms of CO2 retention  Outcome: Progressing     Problem: PAIN - ADULT  Goal: Verbalizes/displays adequate comfort level or patient's stated pain goal  Description: INTERVENTIONS:  - Encourage pt to monitor pain and request assistance  - Assess pain using appropriate pain scale  - Administer analgesics based on type and severity of pain and evaluate response  - Implement non-pharmacological measures as appropriate and evaluate response  - Consider cultural and social influences on pain and pain management  - Manage/alleviate anxiety  - Utilize distraction and/or relaxation techniques  - Monitor for opioid side effects  - Notify MD/LIP if interventions unsuccessful or patient reports new pain  - Anticipate increased pain with activity and pre-medicate as appropriate  Outcome: Progressing     Problem: GASTROINTESTINAL - ADULT  Goal: Minimal or absence of nausea and vomiting  Description: INTERVENTIONS:  - Maintain adequate hydration with IV or PO as ordered and tolerated  - Nasogastric tube to low intermittent suction as ordered  - Evaluate effectiveness of ordered antiemetic  medications  - Provide nonpharmacologic comfort measures as appropriate  - Advance diet as tolerated, if ordered  - Obtain nutritional consult as needed  - Evaluate fluid balance  Outcome: Progressing     Problem: Patient/Family Goals  Goal: Patient/Family Long Term Goal  Description: Patient's Long Term Goal: discharge to home    Interventions:  - cardiology, neurology consult and follow orders lab,procedures , MRI, XRAY,pt ot , assist with TLSO Brace, monitor cardiac rhythm , rate, v/s, update status with patient , maintain proper v/s, adjust meds with cardiology, comfortable  - See additional Care Plan goals for specific interventions  Outcome: Progressing  Goal: Patient/Family Short Term Goal  Description: Patient's Short Term Goal: safety / fall precautions    Interventions:   - frequent monitor and assist,  ADL ,brp , pain  mgt , monitor v/s , labs , update plan of care  - See additional Care Plan goals for specific interventions  Outcome: Progressing

## 2024-02-11 NOTE — PLAN OF CARE
Assumed patient care, patient is alert and oriented x4. On room air. A-fib on tele, denies complain of chest pain. Abdomen is soft, non-tendered, bowel sounds are active in all four quadrants.  at bedside, questions are answered, plan of care updated.     POC: Patient possible discharge to home today    Problem: CARDIOVASCULAR - ADULT  Goal: Maintains optimal cardiac output and hemodynamic stability  Description: INTERVENTIONS:  - Monitor vital signs, rhythm, and trends  - Monitor for bleeding, hypotension and signs of decreased cardiac output  - Evaluate effectiveness of vasoactive medications to optimize hemodynamic stability  - Monitor arterial and/or venous puncture sites for bleeding and/or hematoma  - Assess quality of pulses, skin color and temperature  - Assess for signs of decreased coronary artery perfusion - ex. Angina  - Evaluate fluid balance, assess for edema, trend weights  Outcome: Progressing  Goal: Absence of cardiac arrhythmias or at baseline  Description: INTERVENTIONS:  - Continuous cardiac monitoring, monitor vital signs, obtain 12 lead EKG if indicated  - Evaluate effectiveness of antiarrhythmic and heart rate control medications as ordered  - Initiate emergency measures for life threatening arrhythmias  - Monitor electrolytes and administer replacement therapy as ordered  Outcome: Progressing

## 2024-02-11 NOTE — PHYSICAL THERAPY NOTE
PHYSICAL THERAPY TREATMENT NOTE - INPATIENT    Room Number: 8609/8609-A     Session: 2     Number of Visits to Meet Established Goals: 3  History related to current admission: Patient is a 78 year old female admitted on 2/6/2024 from home for dyspnea and Afib.  Pt diagnosed with CHF. Pt with recent fall and back pain. XRAY revealing for possible acute T8 compression fracture. Neurosurgery recommending TLSO brace for comfort. Pt ok to be up without brace.   Presenting Problem: Afib, T8 compression fracture  Co-Morbidities : Afib, CAD, lymphedema, gout    ASSESSMENT   Patient demonstrates good  progress this session, goals  remain in progress.    Patient continues to function below baseline with bed mobility, transfers, gait, stair negotiation, maintaining seated position, standing prolonged periods, and performing household tasks.  Contributing factors to remaining limitations include decreased functional strength, decreased endurance/aerobic capacity, pain, and impaired standing balance.  Next session anticipate patient to progress bed mobility, transfers, gait, and stair negotiation.  Physical Therapy will continue to follow patient for duration of hospitalization.    Patient continues to benefit from continued skilled PT services: at discharge to promote functional independence and safety with additional support and return home with home health PT.    PLAN  PT Treatment Plan: Bed mobility;Endurance;Energy conservation;Patient education;Gait training;Balance training;Transfer training;Stair training;Strengthening  Rehab Potential : Good  Frequency (Obs): 3-5x/week    CURRENT GOALS     CURRENT GOALS   Goal #1 Patient is able to demonstrate supine - sit EOB @ level: supervision      Goal #2 Patient is able to demonstrate transfers EOB to/from BSC at assistance level: supervision      Goal #3 Patient is able to ambulate 150 feet with assist device: walker - rolling at assistance level: supervision      Goal #4 Pt  will ascend/descend 1 flight of stairs with supervision   Goal #5     Goal #6     Goal Comments: Goals established on 2024 all goals ongoing    SUBJECTIVE  \"I am not sure if the brace makes a difference\"    OBJECTIVE  Precautions: Spine (TLSO for comfort)    WEIGHT BEARING RESTRICTION  Weight Bearing Restriction: None                PAIN ASSESSMENT   Ratin  Location: back  Management Techniques: Activity promotion;Body mechanics;Breathing techniques;Repositioning    BALANCE                                                                                                                       Static Sitting: Good  Dynamic Sitting: Fair -           Static Standing: Fair -  Dynamic Standing: Fair -    ACTIVITY TOLERANCE           BP: 96/80  BP Location: Right arm (forearm)  BP Method: Automatic  Patient Position: Sitting    O2 WALK  Oxygen Therapy  SPO2% Ambulation on Room Air: 89      AM-PAC '6-Clicks' INPATIENT SHORT FORM - BASIC MOBILITY  How much difficulty does the patient currently have...  Patient Difficulty: Turning over in bed (including adjusting bedclothes, sheets and blankets)?: A Little   Patient Difficulty: Sitting down on and standing up from a chair with arms (e.g., wheelchair, bedside commode, etc.): A Little   Patient Difficulty: Moving from lying on back to sitting on the side of the bed?: A Little   How much help from another person does the patient currently need...   Help from Another: Moving to and from a bed to a chair (including a wheelchair)?: A Little   Help from Another: Need to walk in hospital room?: A Little   Help from Another: Climbing 3-5 steps with a railing?: A Little       AM-PAC Score:  Raw Score: 18   Approx Degree of Impairment: 46.58%   Standardized Score (AM-PAC Scale): 43.63   CMS Modifier (G-Code): CK    FUNCTIONAL ABILITY STATUS  Gait Assessment   Functional Mobility/Gait Assessment  Gait Assistance: Contact guard assist  Distance (ft): 125, 50  Assistive  Device:  (HHA- ' arm)  Pattern:  (slow yanira, excessive kyphosis)  Stairs: Stairs  How Many Stairs: 6  Device: 1 Rail (B hands on rail)  Assist: Minimal assist  Pattern: Ascend and Descend  Ascend and Descend : Step to    Skilled Therapy Provided    Bed Mobility:  Rolling: sup and cues   Supine<>Sit: min a for trunk   Sit<>Supine: min a BLEs     Transfer Mobility:  Sit<>Stand: cga   Stand<>Sit: cga   Gait: min a with fatigue    Therapist's Comments:  present for session.  Patient and  educated in body mechanics for back safety as well as role of walker for balance ane energy conservation.  Currently patient holds 's arm to ambulate.  Discussed role of HHPT and continued recommendation for use of rw.   applies and adjusts brace appropriately in standing prior to ambulation.  Attempted to utilize rw however patient and  refuse use.  Patient with SOB noted post ambulation educated in pursed lip breathing      THERAPEUTIC EXERCISES  Lower Extremity Alternating marching  Ankle pumps  LAQ  Educated to perform in sitting in pain free ROM during the day     Position Sitting     Repetitions   10   Sets   1     Patient End of Session: In bed;Needs met;Call light within reach;RN aware of session/findings;All patient questions and concerns addressed;Family present    PT Session Time: 40 minutes  Gait Training: 15 minutes  Therapeutic Activity: 10 minutes  Therapeutic Exercise: 5 minutes

## 2024-02-11 NOTE — PROGRESS NOTES
[facility] Pharmacy Dosing Service  Warfarin (Coumadin) Subsequent Dosing    Angeles Anand is a 78 year old patient for whom pharmacy is dosing warfarin (Coumadin). Goal INR is 2-3    Recent Labs   Lab 02/07/24  0537 02/08/24  0456 02/09/24  0459 02/10/24  0502 02/11/24  0517   INR 3.32* 4.32* 3.58* 2.99* 2.27*       Consulted by:  Dr. Freeman  Indication:  atrial fibrillation  Potential Drug Interactions:  amiodarone  Other Anticoagulants:  none  Home regimen:  warfarin 2.5 mg nightly    Inpatient Dosing History:    Date 2/6 2/7 2/8 2/9 2/10 2/11   INR 3.55 3.32 4.32 3.58 2.99 2.27   Coumadin dose - - - - 2 mg             Based on above -  1.  For today, Give warfarin (COUMADIN) 2 mg at 2100 tonight  2   PT/INR ordered daily while on warfarin  3.  Pharmacy will continue to follow.  We appreciate the opportunity to assist in the care of this patient.    Mabel Sneed, PharmD  2/11/2024  1:26 PM

## 2024-02-12 VITALS
BODY MASS INDEX: 20 KG/M2 | RESPIRATION RATE: 19 BRPM | DIASTOLIC BLOOD PRESSURE: 84 MMHG | HEART RATE: 80 BPM | OXYGEN SATURATION: 96 % | WEIGHT: 117.63 LBS | TEMPERATURE: 98 F | SYSTOLIC BLOOD PRESSURE: 111 MMHG

## 2024-02-12 LAB
INR BLD: 2.31 (ref 0.8–1.2)
PROTHROMBIN TIME: 25.7 SECONDS (ref 11.6–14.8)

## 2024-02-12 RX ORDER — TORSEMIDE 20 MG/1
20 TABLET ORAL AS NEEDED
Qty: 30 TABLET | Refills: 0 | Status: SHIPPED | OUTPATIENT
Start: 2024-02-12

## 2024-02-12 RX ORDER — DIGOXIN 0.25 MG/ML
250 INJECTION INTRAMUSCULAR; INTRAVENOUS ONCE
Status: COMPLETED | OUTPATIENT
Start: 2024-02-12 | End: 2024-02-12

## 2024-02-12 RX ORDER — DIGOXIN 125 MCG
125 TABLET ORAL DAILY
Qty: 30 TABLET | Refills: 0 | Status: SHIPPED | OUTPATIENT
Start: 2024-02-12

## 2024-02-12 RX ORDER — WARFARIN SODIUM 2 MG/1
2 TABLET ORAL
Status: DISCONTINUED | OUTPATIENT
Start: 2024-02-12 | End: 2024-02-12

## 2024-02-12 RX ORDER — TORSEMIDE 20 MG/1
20 TABLET ORAL DAILY
Qty: 30 TABLET | Refills: 0 | Status: SHIPPED | OUTPATIENT
Start: 2024-02-13 | End: 2024-02-12

## 2024-02-12 NOTE — DISCHARGE SUMMARY
General Medicine Discharge Summary     Patient ID:  Angeles Anand  78 year old  3/6/1945    Admit date: 2/6/2024    Discharge date and time: 2/12/2024    Attending Physician: Bettye Rider DO     Consults: IP CONSULT TO HOSPITALIST  IP CONSULT TO CARDIOLOGY  IP CONSULT TO PHARMACY  IP CONSULT TO NEUROSURGERY  IP CONSULT TO ORTHO SPINE  NURSING CONSULT TO DIETITIAN    Primary Care Physician: Charlene Huang MD     Reason for admission: A-fib with RVR    Risk For Readmission: Low    Discharge Diagnoses: Atrial fibrillation with rapid ventricular response (HCC) [I48.91]  Acute on chronic congestive heart failure, unspecified heart failure type (HCC) [I50.9]  See Additional Discharge Diagnoses in Hospital Course    Discharged Condition: stable    Follow-up with labs/images appointments: Follow-up with cardiology follow-up with cardiology    Exam  Gen: No acute distress  Pulm: Lungs clear, normal respiratory effort  CV: Heart with regular rate and rhythm  Abd: Abdomen soft,       HPI: Per chart    Hospital Course:   Angeles Anand is a 78 year old female with PMH sig for paroxysmal A-fib, a flutter, CAD, history of CABG, hypertension, hyperlipidemia, SAVR, chronic thrombocytopenia, leukopenia 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  Patient was managed for shortness of breath secondary to acute on chronic HFrEF.  She was also managed for A-fib with RVR.  She was seen and evaluated by cardiology, her medications were adjusted.  Please see new medication reconciliation for further changes to her medications.  She continued to improve and was cleared for discharge     BLE swelling, sob 2/2 acute on chronic HFrEF  Elevated pBNP  -monitor on tele  -daily wts  -strict I/os  -ECHO reviewed from 1/22/24   -amio gtt >> po amio per cards  -diuresis per cards - daily torsemide   -cardiology consulted >> apprec recs  -hold ace - in anticipation of  transitioning to entresto     AKInjury - likely 2/2 overdiuresis-resolved at discharge  -albumin and 250cc NS bolus given yest     Back pain - s/p fall  -prn pain control  -xray T and L spine shows T8 compression fracture >> neurosurg recommended MRI  -T spine mri  comp fx is not acute >> await further neurosurg recs >> brace to be placed  -PT/OT - recs HH PT/OT >> ppt declines     Constipation w/ nausea and vomiting  -r/o obstruction >> xray obstructive series NEG for obstruction  -aggressive bowel regimen inclu enema or suppository  -possibly nausea is related to amio gtt     Afib with RVR  Coagulapathy  Supratherapeutic INR  -inr supratherapeutic >> warfarin on hold (pharm to dose)  -monitor on tele  -cont BB, amio     CAD sp CABG w/ SACR  -cards following  -ECHO reviewed     HTN  -hold ace   -transition to entresto  -BB     Hypothyroidism  -synthroid        Operative Procedures:      Imaging: No results found.      Disposition: home    Activity: activity as tolerated  Diet: cardiac diet  Wound Care: none needed  Code Status: Prior  O2: None    Home Medication Changes:     Med list     Medication List        START taking these medications      digoxin 0.125 MG Tabs  Commonly known as: Lanoxin  Take 1 tablet (125 mcg total) by mouth daily.     eplerenone 25 MG Tabs  Commonly known as: Inspra  Take 1 tablet (25 mg total) by mouth daily.     metoprolol tartrate 50 MG Tabs  Commonly known as: Lopressor  Take 1 tablet (50 mg total) by mouth 2x Daily(Beta Blocker).     torsemide 20 MG Tabs  Commonly known as: Demadex  Take 1 tablet (20 mg total) by mouth as needed. As needed daily for weight gain or lower extremity swelling            CONTINUE taking these medications      amiodarone 200 MG Tabs  Commonly known as: Pacerone  1 tab po bid x 7 days, then 1 tablet daily there after  Notes to patient: Continue taking daily     anastrozole 1 MG Tabs tab  Commonly known as: Arimidex  Take 1 tablet (1 mg total) by mouth  daily.     CALTRATE 600 PLUS-VIT D OR     warfarin 2.5 MG Tabs  Commonly known as: Coumadin            STOP taking these medications      cephalexin 500 MG Caps  Commonly known as: Keflex     metoprolol succinate ER 50 MG Tb24  Commonly known as: Toprol XL     ramipril 5 MG Caps  Commonly known as: Altace     simvastatin 40 MG Tabs  Commonly known as: Zocor               Where to Get Your Medications        These medications were sent to [id number] - [place], [place] - [address] [facility] [phone], [phone]  [address] [facility], [facility] [address]      Phone: [phone]   digoxin 0.125 MG Tabs  torsemide 20 MG Tabs       You can get these medications from any pharmacy    Bring a paper prescription for each of these medications  eplerenone 25 MG Tabs  metoprolol tartrate 50 MG Tabs         FU   Follow-up Information       Ritesh Guerra MD Follow up in 1 week(s).    Specialty: CARDIOLOGY  Why: Please follow next Tuesday 2/20/2024 at 4:15 pm with Dr. Guerra in the office  Contact information:  [address] [address] [phone]               Charlene Huang MD Follow up in 1 week(s).    Specialties: Internal Medicine, HOSPITALIST  Why: call for appointment  Contact information:  [address] [facility]  [address]  [facility] [address] [phone]               Ritesh Esteves MD Follow up in 4 week(s).    Specialty: NEUROSURGERY  Why: Follow up in 4-6 weeks with X-RAY.  Contact information:  [address]  [facility] [address] [phone]                             PA instructions:      Discharge medications reviewed and reconciled.    Total Time Coordinating Care: Greater than 30 minutes    Patient had opportunity to ask questions and state understand and agree with therapeutic plan as outlined    Thank You,    DO Sebastian Hernandez Hospitalist  Pager

## 2024-02-12 NOTE — PROGRESS NOTES
[facility] Dosing [facility]  Warfarin (Coumadin) Subsequent Dosing    Angeles Anand is a 78 year old patient for whom pharmacy is dosing warfarin (Coumadin). Goal INR is 2-3    Recent Labs   Lab 02/08/24  0456 02/09/24  0459 02/10/24  0502 02/11/24  0517 02/12/24  0453   INR 4.32* 3.58* 2.99* 2.27* 2.31*       Consulted by:  Dr. Freeman  Indication:  atrial fibrillation  Potential Drug Interactions:  amiodarone  Other Anticoagulants:  none  Home regimen:  warfarin 2.5 mg nightly     Inpatient Dosing History:     Date 2/6 2/7 2/8 2/9 2/10 2/11 2/12   INR 3.55 3.32 4.32 3.58 2.99 2.27 2.31   Coumadin dose - - - - 2 mg  2 mg                 Based on above -  1.  For today, Give warfarin (COUMADIN) 2 mg at 2100 tonight  2   PT/INR ordered daily while on warfarin  3.  Pharmacy will continue to follow.  We appreciate the opportunity to assist in the care of this patient.    Esperanza Calloway, PharmD  2/12/2024  8:34 AM

## 2024-02-12 NOTE — PROGRESS NOTES
Progress Note  Angeles Anand Patient Status:  Inpatient    3/6/1945 MRN [id number]   Location [facility] 8NE-A Attending Elizabeth Freeman MD   Hosp Day # 6 PCP Charlene Huang MD     Subjective:  Mrs. Anand feels well today. She denies dizziness or dyspnea.    Objective:  Physical Exam:   /66 (BP Location: Right arm)   Pulse 87   Temp 97.6 °F (36.4 °C) (Oral)   Resp 18   Wt 117 lb 9.6 oz (53.3 kg)   SpO2 97%   BMI 20.19 kg/m²   Temp (24hrs), Av.8 °F (36.6 °C), Min:97.5 °F (36.4 °C), Max:98.6 °F (37 °C)       Intake/Output Summary (Last 24 hours) at 2024 0836  Last data filed at 2024 0507  Gross per 24 hour   Intake 240 ml   Output 800 ml   Net -560 ml     Wt Readings from Last 3 Encounters:   24 117 lb 9.6 oz (53.3 kg)   24 125 lb (56.7 kg)   24 125 lb (56.7 kg)     Telemetry: Atrial fibrillation with ventricular rates in the 80's  General: Chronically ill and thin appearing female who is alert and oriented in no apparent distress. Sit's to stands with minimal assistance transition to bedside chair denying dizziness.  HEENT: No focal deficits.  Neck: No JVD, carotids 2+ no bruits.  Cardiac: Irregularly irregular, S1, S2 normal, no murmur, rub or gallop.  Lungs: Clear without wheezes, rales, rhonchi or dullness.  Normal excursions and effort.  Abdomen: Soft, non-tender.   Extremities: Without clubbing, cyanosis or edema.  Peripheral pulses are 2+.  Neurologic: Alert and oriented, normal affect.  Skin: Warm and dry.        Intake/Output:    Intake/Output Summary (Last 24 hours) at 2024 0836  Last data filed at 2024 0507  Gross per 24 hour   Intake 240 ml   Output 800 ml   Net -560 ml       Last 3 Weights   24 0600 117 lb 9.6 oz (53.3 kg)   24 0502 117 lb 12.8 oz (53.4 kg)   02/10/24 0407 118 lb 9.7 oz (53.8 kg)   24 0612 120 lb 9.5 oz (54.7 kg)   24 0611 120 lb 9.5 oz (54.7 kg)   24 0515 123 lb 3.8 oz (55.9 kg)   24  2032 126 lb 5.2 oz (57.3 kg)   02/04/24 1522 125 lb (56.7 kg)   01/21/24 1050 125 lb (56.7 kg)   01/21/24 0650 125 lb (56.7 kg)       Labs:  Recent Labs   Lab 02/09/24  0459 02/10/24  0502 02/11/24  0757   GLU 89 125* 94   BUN 16 20 18   CREATSERUM 0.68 1.18* 0.90   EGFRCR 90 48* 66   CA 8.6 8.4* 8.7    139 139   K 4.3 3.9 4.1    106 104   CO2 23.0 24.0 29.0     Recent Labs   Lab 02/06/24  1818 02/07/24  0536   RBC 4.64 4.53   HGB 13.0 12.7   HCT 40.8 37.8   MCV 87.9 83.4   MCH 28.0 28.0   MCHC 31.9 33.6   RDW 13.4 13.4   NEPRELIM 3.19 2.92   WBC 6.6 7.4   .0 161.0         Recent Labs   Lab 02/06/24  1818   TROPHS 16       Diagnostics:   ECHOCARDIOGRAM 1/22:  1. Left ventricle: The cavity size was increased. Wall thickness was normal.Systolic function was reduced. The estimated ejection fraction was 25-30%, by visual assessment. Unable to assess LV diastolic function due to heart rhythm.   2. Left atrium: The left atrial volume was moderately increased.   3. Right atrium: The atrium was mildly dilated.   4. Aortic valve: A bioprosthetic valve is present. There was mild      regurgitation. The peak systolic velocity was 2.04m/sec. The mean      systolic gradient was 8mm Hg. The valve area (VTI) was 1.65cm^2. The valve area (VTI) index was 1.05cm^2/m^2.   5. Mitral valve: There was mild regurgitation.   6. Pulmonary arteries: Systolic pressure was at the upper limits of normal, estimated to be 32mm Hg. The peak systolic pressure is 32mm Hg.   Impressions:  This study is compared with previous dated 05-29-19: Unable to compare images side to side.   LVEF may be similar or somewhat lower than reported prior.             Medications:   warfarin  2 mg Oral Once at night    metoprolol tartrate  50 mg Oral 2x Daily(Beta Blocker)    amiodarone  200 mg Oral Daily    torsemide  20 mg Oral Daily    [Held by provider] sacubitril-valsartan  1 tablet Oral BID    eplerenone  25 mg Oral Daily    anastrozole  1 mg  Oral Daily         Assessment/Plan:  Paroxysmal afib/flutter  Continue PO amiodarone  Adjusted to metoprolol tartrate- improved rates  Warfarin-supratherapeutic INR at presentation trending down- pharmacy dosing  Acute on chronic HFrEF  Inspra and BB (tartrate for rate control for now).    Losartan 25 mg daily at discharge-hypotension on Entresto  Torsemide held - currently compensated/dry appearing  Hx of CAD s/p CABG  No chest pain or dynamic ECG changes  HTN  At goal on CHF GDMT  HLD  OP statin  T8 compression Fx  Neurosurgery following  Elevated LFTs  Improving  PLAN   Continue po amiodarone 200 mg daily- monitor LFT's  Maintain metoprolol tartrate 50 mg BID improved ventricular rates  Continue Inspra 25 mg daily  Continue torsemide 20 mg daily prn weight gain/edema  Therapeutic INR this AM   I have contacted our warfarin clinic with plan for repeat INR Wednesday 2/14/2024-amio noted  Reasonable for her to discharge from a cardiology perspective.  Will discuss with her outpatient provider Dr. Charlie Wei PA-C  2/12/2024  08:37 AM     Addendum:    Reviewed in detail with patient and her . Reviewed with AMANDA Wei.    Feels okay - has been up ambulating    Unfortunately remains in atrial fibrillation    Intermittent S3 gallop on exam - no edema    Will discharge home today - please have her see me next Tuesday in office - my office can add her on after the last currently scheduled patient of the day    Hold off ARB/ACE in anticipation of resuming Entresto as outpatient    Have reviewed with my EP colleagues - will assess for ablation as outpatient      CV regimen upon discharge:    Amiodarone 200 mg daily  Simvastatin 40 mg at bedtime - should be held until I give the okay in the office  Metoprolol tartrate 50 mg BID  Inspra 25 mg daily - new  Digoxin 0.125 mg daily - new  Warfarin 2.5 mg daily or as instructed by the coumadin clinic  Torsemide 20 mg daily prn weight gain or LE edema    No  further ramipril for now      CMP next Monday or Tuesday before our office visit        L3

## 2024-02-12 NOTE — PROGRESS NOTES
Pt alert and oriented x4. Pt on tele in A fib, asymptomatic. Pt on room air. Pt denies any c/o of pain. Pt continent of bowel and bladder. Pt family member at bedside. Pt and family updated on plan of care, pt verbalizes understanding.         1610: Pt is ok to discharge per clearance from primary and consults of care team. Discharge instructions including medications, education and follow-ups given. Pt verbalizes understanding and questions answered. IV and tele removed. All belongings taken with patient. Pt transported off unit via wheelchair and assisted into family members car.

## 2024-02-12 NOTE — PLAN OF CARE
Patient is alert and oriented x 4. Maintaining O2 saturation WNL on room air. Afib on tele monitor with controled HR. BP stable overnight. Patient complained about back pain, PRN Tylenol given. Patient ambulating with stand by assist. Continent of bladder and bowel. Patient aware of plan of care. Safety precautions in place, call light within reach, bed alarm on.       Problem: CARDIOVASCULAR - ADULT  Goal: Maintains optimal cardiac output and hemodynamic stability  Description: INTERVENTIONS:  - Monitor vital signs, rhythm, and trends  - Monitor for bleeding, hypotension and signs of decreased cardiac output  - Evaluate effectiveness of vasoactive medications to optimize hemodynamic stability  - Monitor arterial and/or venous puncture sites for bleeding and/or hematoma  - Assess quality of pulses, skin color and temperature  - Assess for signs of decreased coronary artery perfusion - ex. Angina  - Evaluate fluid balance, assess for edema, trend weights  Outcome: Progressing  Goal: Absence of cardiac arrhythmias or at baseline  Description: INTERVENTIONS:  - Continuous cardiac monitoring, monitor vital signs, obtain 12 lead EKG if indicated  - Evaluate effectiveness of antiarrhythmic and heart rate control medications as ordered  - Initiate emergency measures for life threatening arrhythmias  - Monitor electrolytes and administer replacement therapy as ordered  Outcome: Progressing     Problem: SAFETY ADULT - FALL  Goal: Free from fall injury  Description: INTERVENTIONS:  - Assess pt frequently for physical needs  - Identify cognitive and physical deficits and behaviors that affect risk of falls.  - [place] fall precautions as indicated by assessment.  - Educate pt/family on patient safety including physical limitations  - Instruct pt to call for assistance with activity based on assessment  - Modify environment to reduce risk of injury  - Provide assistive devices as appropriate  - Consider OT/PT consult to  assist with strengthening/mobility  - Encourage toileting schedule  Outcome: Progressing     Problem: RESPIRATORY - ADULT  Goal: Achieves optimal ventilation and oxygenation  Description: INTERVENTIONS:  - Assess for changes in respiratory status  - Assess for changes in mentation and behavior  - Position to facilitate oxygenation and minimize respiratory effort  - Oxygen supplementation based on oxygen saturation or ABGs  - Provide Smoking Cessation handout, if applicable  - Encourage broncho-pulmonary hygiene including cough, deep breathe, Incentive Spirometry  - Assess the need for suctioning and perform as needed  - Assess and instruct to report SOB or any respiratory difficulty  - Respiratory Therapy support as indicated  - Manage/alleviate anxiety  - Monitor for signs/symptoms of CO2 retention  Outcome: Progressing     Problem: PAIN - ADULT  Goal: Verbalizes/displays adequate comfort level or patient's stated pain goal  Description: INTERVENTIONS:  - Encourage pt to monitor pain and request assistance  - Assess pain using appropriate pain scale  - Administer analgesics based on type and severity of pain and evaluate response  - Implement non-pharmacological measures as appropriate and evaluate response  - Consider cultural and social influences on pain and pain management  - Manage/alleviate anxiety  - Utilize distraction and/or relaxation techniques  - Monitor for opioid side effects  - Notify MD/LIP if interventions unsuccessful or patient reports new pain  - Anticipate increased pain with activity and pre-medicate as appropriate  Outcome: Progressing     Problem: GASTROINTESTINAL - ADULT  Goal: Minimal or absence of nausea and vomiting  Description: INTERVENTIONS:  - Maintain adequate hydration with IV or PO as ordered and tolerated  - Nasogastric tube to low intermittent suction as ordered  - Evaluate effectiveness of ordered antiemetic medications  - Provide nonpharmacologic comfort measures as  appropriate  - Advance diet as tolerated, if ordered  - Obtain nutritional consult as needed  - Evaluate fluid balance  Outcome: Progressing

## 2024-02-13 NOTE — CDS QUERY
CLINICAL DOCUMENTATION CLARIFICATION FORM  Dear  ,   Clinical information in the patient's medical record (provided below) includes documentation of  T-8 Compression Fracture. Please clarify the nature of the compression fracture.   PLEASE CHECK THE DIAGNOSIS THAT APPLIES.  SELECTION BY PROVIDER ONLY  [x  ] Traumatic Compression Fracture  [  ] Non-Traumatic Compression Fracture, likely related to_________________  [  ] Other (please specify): ___________________________________________________________        Documentation from the medical record  Risk; Fall, osteopenia  Clinical indicators;H&P- C/o back pain following fall-xray ordered  2/8 Hospitalist PN; xray T and L spine shows likely acute T8 compression fracture >> orthospine consulted   Orthospine recommends MRI    2/9 Hospitalist PN; T spine mri  comp fx is not acute >> await further neurosurg recs >> brace to be placed     Treatment; pain control , xray, MRI, Neurosurgery consult , PT/OT , brace        For questions regarding this query, please contact: Rashaun Corcoran RN, BSN, CCDS  Ext:[phone]. [email or web address]       THIS FORM IS A PERMANENT PART OF THE MEDICAL RECORD

## 2024-02-14 ENCOUNTER — HOSPITAL ENCOUNTER (OUTPATIENT)
Dept: LAB | Facility: HOSPITAL | Age: 79
Discharge: HOME | End: 2024-02-14
Attending: INTERNAL MEDICINE
Payer: MEDICARE

## 2024-02-14 DIAGNOSIS — I48.91 ATRIAL FIBRILLATION WITH RAPID VENTRICULAR RESPONSE (HCC): ICD-10-CM

## 2024-02-14 DIAGNOSIS — I50.9 ACUTE ON CHRONIC CONGESTIVE HEART FAILURE, UNSPECIFIED HEART FAILURE TYPE (HCC): ICD-10-CM

## 2024-02-14 LAB
ALBUMIN SERPL-MCNC: 3.9 G/DL (ref 3.4–5)
ALBUMIN/GLOB SERPL: 1.1 {RATIO} (ref 1–2)
ALP LIVER SERPL-CCNC: 98 U/L
ALT SERPL-CCNC: 181 U/L
ANION GAP SERPL CALC-SCNC: 4 MMOL/L (ref 0–18)
AST SERPL-CCNC: 44 U/L (ref 15–37)
BILIRUB SERPL-MCNC: 1.2 MG/DL (ref 0.1–2)
BUN BLD-MCNC: 25 MG/DL (ref 9–23)
CALCIUM BLD-MCNC: 9.9 MG/DL (ref 8.5–10.1)
CHLORIDE SERPL-SCNC: 103 MMOL/L (ref 98–112)
CO2 SERPL-SCNC: 29 MMOL/L (ref 21–32)
CREAT BLD-MCNC: 1.28 MG/DL
EGFRCR SERPLBLD CKD-EPI 2021: 43 ML/MIN/1.73M2 (ref 60–?)
FASTING STATUS PATIENT QL REPORTED: YES
GLOBULIN PLAS-MCNC: 3.5 G/DL (ref 2.8–4.4)
GLUCOSE BLD-MCNC: 114 MG/DL (ref 70–99)
INR BLD: 2.31 (ref 0.8–1.2)
OSMOLALITY SERPL CALC.SUM OF ELEC: 287 MOSM/KG (ref 275–295)
POTASSIUM SERPL-SCNC: 4.6 MMOL/L (ref 3.5–5.1)
PROT SERPL-MCNC: 7.4 G/DL (ref 6.4–8.2)
PROTHROMBIN TIME: 25.7 SECONDS (ref 11.6–14.8)
SODIUM SERPL-SCNC: 136 MMOL/L (ref 136–145)

## 2024-02-14 PROCEDURE — 80053 COMPREHEN METABOLIC PANEL: CPT | Performed by: PHYSICIAN ASSISTANT

## 2024-02-14 PROCEDURE — 85610 PROTHROMBIN TIME: CPT | Performed by: PHYSICIAN ASSISTANT

## 2024-02-14 PROCEDURE — 36415 COLL VENOUS BLD VENIPUNCTURE: CPT | Performed by: PHYSICIAN ASSISTANT

## 2024-02-19 ENCOUNTER — HOSPITAL ENCOUNTER (OUTPATIENT)
Dept: LAB | Facility: HOSPITAL | Age: 79
Discharge: HOME | End: 2024-02-19
Attending: INTERNAL MEDICINE
Payer: MEDICARE

## 2024-02-19 DIAGNOSIS — I48.0 PAROXYSMAL ATRIAL FIBRILLATION (HCC): ICD-10-CM

## 2024-02-19 LAB — INR BLDC: 3 (ref 0.9–1.1)

## 2024-02-19 PROCEDURE — 85610 PROTHROMBIN TIME: CPT

## 2024-02-26 ENCOUNTER — HOSPITAL ENCOUNTER (OUTPATIENT)
Dept: LAB | Facility: HOSPITAL | Age: 79
Discharge: HOME | End: 2024-02-26
Attending: INTERNAL MEDICINE
Payer: MEDICARE

## 2024-02-26 DIAGNOSIS — I48.0 PAROXYSMAL ATRIAL FIBRILLATION (HCC): ICD-10-CM

## 2024-02-26 LAB
INR BLD: 3.62 (ref 0.8–1.2)
INR BLDC: 4.6 (ref 0.9–1.1)
PROTHROMBIN TIME: 36.7 SECONDS (ref 11.6–14.8)

## 2024-02-26 PROCEDURE — 36415 COLL VENOUS BLD VENIPUNCTURE: CPT

## 2024-02-26 PROCEDURE — 85610 PROTHROMBIN TIME: CPT

## 2024-02-26 RX ORDER — ACETAMINOPHEN 325 MG/1
325 TABLET ORAL EVERY 6 HOURS PRN
COMMUNITY

## 2024-02-26 RX ORDER — ACETAMINOPHEN AND CODEINE PHOSPHATE 300; 30 MG/1; MG/1
1 TABLET ORAL DAILY PRN
COMMUNITY

## 2024-02-26 NOTE — PAT NURSING NOTE
Pre Procedure Instructions for Cardioversion 3/4 with Dr. Dee    Instructions given to patient's spouse, verbalized understanding. Time of arrival call Friday afternoon after 3:00 pm. [place] at [facility] registration desk, park in [address] parking garage or [employer] parking. NPO at midnight, sips of water in morning with medications, hold vitamins, supplements, torsemide and eplerenone morning of procedure. Need responsible adult  present.

## 2024-02-27 ENCOUNTER — HOSPITAL ENCOUNTER (EMERGENCY)
Facility: HOSPITAL | Age: 79
Discharge: LEFT BEFORE DISCHARGE | End: 2024-02-27
Payer: MEDICARE

## 2024-02-27 VITALS
BODY MASS INDEX: 19.81 KG/M2 | DIASTOLIC BLOOD PRESSURE: 55 MMHG | HEIGHT: 64 IN | RESPIRATION RATE: 18 BRPM | OXYGEN SATURATION: 92 % | HEART RATE: 46 BPM | TEMPERATURE: 97 F | WEIGHT: 116 LBS | SYSTOLIC BLOOD PRESSURE: 120 MMHG

## 2024-02-27 NOTE — ED INITIAL ASSESSMENT (HPI)
PT reports back pain for weeks, admitted for a week. Xray and MRI completed then. Pain is at T8 level and wraps around to front. Pain is worse at night. Family reports PT fell a at first, leading to pain. He states she has had numerous x-rays, all negative. He states the MRI showed a chronic injury at T8.

## 2024-02-28 ENCOUNTER — OFFICE VISIT (OUTPATIENT)
Dept: HEMATOLOGY/ONCOLOGY | Facility: HOSPITAL | Age: 79
End: 2024-02-28
Attending: INTERNAL MEDICINE
Payer: MEDICARE

## 2024-02-28 VITALS
SYSTOLIC BLOOD PRESSURE: 125 MMHG | DIASTOLIC BLOOD PRESSURE: 73 MMHG | RESPIRATION RATE: 16 BRPM | TEMPERATURE: 98 F | HEART RATE: 45 BPM | OXYGEN SATURATION: 97 %

## 2024-02-28 DIAGNOSIS — Z17.0 MALIGNANT NEOPLASM OF BREAST IN FEMALE, ESTROGEN RECEPTOR POSITIVE, UNSPECIFIED LATERALITY, UNSPECIFIED SITE OF BREAST (HCC): ICD-10-CM

## 2024-02-28 DIAGNOSIS — C50.919 MALIGNANT NEOPLASM OF BREAST IN FEMALE, ESTROGEN RECEPTOR POSITIVE, UNSPECIFIED LATERALITY, UNSPECIFIED SITE OF BREAST (HCC): ICD-10-CM

## 2024-02-28 DIAGNOSIS — Z78.0 POSTMENOPAUSAL: Primary | ICD-10-CM

## 2024-02-28 DIAGNOSIS — S22.060A COMPRESSION FRACTURE OF T8 VERTEBRA, INITIAL ENCOUNTER (HCC): ICD-10-CM

## 2024-02-28 DIAGNOSIS — Z12.31 ENCOUNTER FOR MAMMOGRAM TO ESTABLISH BASELINE MAMMOGRAM: ICD-10-CM

## 2024-02-28 PROCEDURE — 99214 OFFICE O/P EST MOD 30 MIN: CPT | Performed by: INTERNAL MEDICINE

## 2024-02-28 RX ORDER — CHOLECALCIFEROL (VITAMIN D3) 125 MCG
5 CAPSULE ORAL NIGHTLY
COMMUNITY

## 2024-02-28 RX ORDER — DIPHENHYDRAMINE HCL 25 MG
TABLET ORAL
COMMUNITY
Start: 2024-02-21

## 2024-02-28 RX ORDER — WARFARIN SODIUM 5 MG/1
TABLET ORAL
COMMUNITY
Start: 2023-12-04

## 2024-02-28 RX ORDER — PREDNISONE 50 MG/1
TABLET ORAL
COMMUNITY
Start: 2024-02-21

## 2024-02-28 NOTE — PROGRESS NOTES
Education Record    Learner:  Patient/ spouse    Disease / Diagnosis:   Breast cancer   Barriers / Limitations:  None   Comments:    Method:  Discussion   Comments:    General Topics:  Plan of care reviewed   Comments:    Outcome:  Shows understanding   Comments:   Taking anastrozole.   Pt with recent admissions and ER visits for pain and a fib.   Currently in a fib, having cardioversion on Monday.   Back pain, taking tylenol #3, seeing a specialist today, taking recent MRI.   Appetite is lower, with weight loss per .   Reviewed strategies to increase calories.

## 2024-02-28 NOTE — PROGRESS NOTES
[facility] Progress Note      Patient Name:  Angeles Anand  YOB: 1945  Medical Record Number:  [id number]    Date of visit: 2/28/2024      CHIEF COMPLAINT: Stage IIA L breast ca, s/p lumpectomy, RT.    HPI:  78 year old female that I have followed since 3/13. S/p L lumpectomy-1.4 cm grade 2 IDC with 1+LN. Tumor  ER/SC+, HER2 negative. She declined Oncotype DX or chemotherapy and started anastrozole in 3/13. She comes for a followup visit today.  Recent admission for A-fib with RVR.  Then admission for severe back pain attributed to T8 compression fracture.  She has upcoming appointment for that.  ROS:     CONSTITUTIONAL: no fever,chills fatigue,night sweats or weight loss  NEUROLOGIC: no headache, seizures, diplopia or weakness  RESPIRATORY: no cough, SOB or hemoptysis  CVS: no chest pain, ankle swelling, [place]  GI: no nausea, vomiting, diarrhea or BRBPR  MUSCULOSKELETAL: Back pain.  DERM: no alopecia, rash, pruritis  : no hematuria, dysuria or frequency  PSYCH: no confusion, depression or panic  HEME: no easy bruising or bleeding     ALLERGIES:    Allergies   Allergen Reactions    Gadolinium     Radiology Contrast Iodinated Dyes     Kiwi Extract SWELLING       MEDICATIONS:    Current Outpatient Medications   Medication Sig Dispense Refill    diphenhydrAMINE HCl 25 MG Oral Tab Take 2 tablets (50 mg) by mouth 1 hour prior to CTA      predniSONE 50 MG Oral Tab Take 1 tablet by mouth 13 hours, 7 hours, and 1 hour prior to CTA.      Melatonin 5 MG Oral Tab Take 1 tablet (5 mg total) by mouth nightly.      warfarin 5 MG Oral Tab       acetaminophen-codeine (TYLENOL WITH CODEINE #3) 300-30 MG Oral Tab Take 1 tablet by mouth daily as needed for Pain.      acetaminophen 325 MG Oral Tab Take 1 tablet (325 mg total) by mouth every 6 (six) hours as needed for Pain.      digoxin 0.125 MG Oral Tab Take 1 tablet (125 mcg total) by mouth daily. 30 tablet 0    torsemide 20 MG Oral Tab Take 1 tablet (20 mg  total) by mouth as needed. As needed daily for weight gain or lower extremity swelling 30 tablet 0    eplerenone 25 MG Oral Tab Take 1 tablet (25 mg total) by mouth daily. 30 tablet 0    metoprolol tartrate 50 MG Oral Tab Take 1 tablet (50 mg total) by mouth 2x Daily(Beta Blocker). 60 tablet 0    amiodarone 200 MG Oral Tab 1 tab po bid x 7 days, then 1 tablet daily there after 40 tablet 3    warfarin 2.5 MG Oral Tab Take 1 tablet (2.5 mg total) by mouth nightly. Take as directed by the warfarin clinic      Calcium-Vitamin D (CALTRATE 600 PLUS-VIT D OR) Take 600 mg by mouth 2 (two) times daily.           VITALS:  Height: --  Weight: --  BSA (Calculated - sq m): --  Pulse: 45 (939)  BP: 125/73 (939)  Temp: 97.9 °F (36.6 °C) (939)  Do Not Use - Resp Rate: --  SpO2: 97 % (939)      PS:  ECO-3    PHYSICAL EXAM:    General: alert and oriented x 3, not in acute distress. Accompanied by .  In wheelchair.  HEENT: SRINIVASA, oropharynx  clear.  Neck: supple.  No JVD /adenopathy.  CVS: S1S2, regular  Rhythm, no murmurs.   Lungs: Clear to auscultation and percussion.  Abdomen: Soft, non tender, no hepato-splenomegaly.  Extremities:  No edema.  CNS: no focal deficit  Breast: Unable to examine as in wheelchair and severe back pain.    Emotional well being: Patient's emotional well being was assessed.  No issues requiring acute psychosocial intervention were identified.     ASSESSMENT AND PLAN:     # Stage II L breast ca, s/p lumpectomy (T1N1MX) s/p L breast lumpectomy 3/13 - 1.4 cm  grade 2 IDC, 1+LN with 0.4 cm  metastasis, no extracapsular extension. Tumor  ER/RI+, HER2 negative.  On anastrozole since 3/13, she completed 10 years in 3/23 but was nervous about stopping so elected to continue.  In light of her T8 compression fracture, I have recommended against further endocrine therapy with aromatase inhibitors.  Mammogram  ok, next due .   # Osteopenia:  DEXA  showed osteopenia  with improved T score of -1.3 compared to -1.6 in 11/20.  May repeat 11/24.  ORDERS PLACED:      Procedures    XR DEXA BONE DENSITOMETRY (CPT=77080)    COLBY DARCI 2D+3D SCREENING BILAT (CPT=77067/04692)       Return in about 1 year (around 2/28/2025) for MD visit.      Ava Brown M.D.    [facility]    [address] [place], [address]    2/28/2024

## 2024-03-04 ENCOUNTER — HOSPITAL ENCOUNTER (OUTPATIENT)
Dept: INTERVENTIONAL RADIOLOGY/VASCULAR | Facility: HOSPITAL | Age: 79
Discharge: HOME | End: 2024-03-04
Attending: INTERNAL MEDICINE | Admitting: INTERNAL MEDICINE
Payer: MEDICARE

## 2024-03-04 VITALS
HEIGHT: 63.5 IN | RESPIRATION RATE: 19 BRPM | OXYGEN SATURATION: 98 % | HEART RATE: 48 BPM | BODY MASS INDEX: 20.65 KG/M2 | WEIGHT: 118 LBS | SYSTOLIC BLOOD PRESSURE: 167 MMHG | DIASTOLIC BLOOD PRESSURE: 64 MMHG

## 2024-03-04 DIAGNOSIS — I48.91 A-FIB (HCC): ICD-10-CM

## 2024-03-04 LAB
ATRIAL RATE: 50 BPM
INR BLD: 3.94 (ref 0.8–1.2)
INR: 5 (ref 0.8–1.3)
P AXIS: -7 DEGREES
P-R INTERVAL: 250 MS
PROTHROMBIN TIME: 39.2 SECONDS (ref 11.6–14.8)
Q-T INTERVAL: 522 MS
QRS DURATION: 176 MS
QTC CALCULATION (BEZET): 475 MS
R AXIS: 171 DEGREES
T AXIS: 2 DEGREES
VENTRICULAR RATE: 50 BPM

## 2024-03-04 PROCEDURE — 93005 ELECTROCARDIOGRAM TRACING: CPT

## 2024-03-04 PROCEDURE — 85610 PROTHROMBIN TIME: CPT | Performed by: INTERNAL MEDICINE

## 2024-03-04 PROCEDURE — 92960 CARDIOVERSION ELECTRIC EXT: CPT | Performed by: INTERNAL MEDICINE

## 2024-03-04 PROCEDURE — 93010 ELECTROCARDIOGRAM REPORT: CPT | Performed by: INTERNAL MEDICINE

## 2024-03-04 RX ORDER — METHOHEXITAL IN WATER/PF 100MG/10ML
100 SYRINGE (ML) INTRAVENOUS ONCE
Status: DISCONTINUED | OUTPATIENT
Start: 2024-03-04 | End: 2024-03-04

## 2024-03-04 RX ORDER — SODIUM CHLORIDE 9 MG/ML
INJECTION, SOLUTION INTRAVENOUS CONTINUOUS
Status: DISCONTINUED | OUTPATIENT
Start: 2024-03-04 | End: 2024-03-04

## 2024-03-04 NOTE — H&P
[place]  Pre Procedure History and Physical    Angeles Anand Patient Status:  Outpatient    3/6/1945 MRN [id number]   Location [facility] INTERVENTIONAL SUITES Attending Pete Dee MD   Hosp Day # 0 PCP None Pcp     Consults      History of Present Illness:  Angeles Anand is a a(n) 78 year old female here for DCCV      Prior H/P Reviewed with no changes    History:  Past Medical History:   Diagnosis Date    Arrhythmia     ATRIAL FIBRILLATION     Dr. Guerra    Atrial flutter (HCC)     Breast cancer (HCC) 2013    INVASIVE DUCTAL    CANCER 10/1996    breast CIS- ?ductal     Cancer (HCC)     CORONARY ARTERY DISEASE     Endocrine disorder     Gout     History of blood transfusion     Hypertension     Lymphedema of upper extremity following lymphadenectomy 2014    Migraines     Osteopenia     Other and unspecified hyperlipidemia     Unspecified essential hypertension     Visual impairment      Past Surgical History:   Procedure Laterality Date    ANGIOGRAM      APPENDECTOMY      APPENDECTOMY      CABG  2004    HYSTERECTOMY      MIKE/BSO- fibroid uterus    LUMPECTOMY LEFT  2013    invasive ductal    COLBY LOCALIZATION WIRE 1 SITE LEFT (CPT=19281)  ;    NEEDLE BIOPSY LEFT      us core bx-papilloma    NEEDLE BIOPSY RIGHT  2013    mri bx-sclerosing adenosis    OOPHORECTOMY      OTHER  2015    thoracic aortic aneurysm repair    OTHER SURGICAL HISTORY  2004    Surgery for aortic dissection- aortic root repair, aortic valve replacement, single CABG    OTHER SURGICAL HISTORY       left breast biopsy in  and     OTHER SURGICAL HISTORY  3/1/2013    re-excision left lumpectomy    RADIATION LEFT  2013     Family History   Problem Relation Age of Onset    Heart Disorder Father     Diabetes Mother     Breast Cancer Self 68      reports that she quit smoking about 36 years ago. Her smoking use included cigarettes. She has never used smokeless tobacco. She reports that  she does not currently use alcohol. She reports that she does not use drugs.    Allergies:  Allergies   Allergen Reactions    Radiology Contrast Iodinated Dyes HIVES and RASH    Gadolinium     Kiwi Extract SWELLING       Medications:  No current facility-administered medications for this encounter.    OBJECTIVE      Physical Exam:  Physical Exam   Height 5' 3.5\" (1.613 m), weight 118 lb (53.5 kg), not currently breastfeeding.  No data recorded.    Wt Readings from Last 3 Encounters:   02/26/24 118 lb (53.5 kg)   02/12/24 117 lb 9.6 oz (53.3 kg)   02/04/24 125 lb (56.7 kg)       NAD  PERRLA/EOMI  Neck veins not elevated  Carotids- no bruits  CTA bilaterally  Cardiac- irreg irreg  Abdomen- Soft,Nontender, normal BS  Extremities- pulses normal  Edema- none  Mood /Affect Congruent  Skin- no lesions          Results:   No results for input(s): \"GLU\", \"BUN\", \"CREATSERUM\", \"GFRAA\", \"GFRNAA\", \"EGFRCR\", \"CA\", \"NA\", \"K\", \"CL\", \"CO2\" in the last 168 hours.  No results for input(s): \"RBC\", \"HGB\", \"HCT\", \"MCV\", \"MCH\", \"MCHC\", \"RDW\", \"NEPRELIM\", \"WBC\", \"PLT\" in the last 168 hours.      [unfilled]  No results for input(s): \"BNP\" in the last 168 hours.  Lab Results   Component Value Date    PT 21.9 (H) 08/21/2013    PT 20.5 (H) 08/20/2013    PT 23.9 (H) 08/19/2013    INR 3.62 (H) 02/26/2024    INR 4.60 (H) 02/26/2024    INR 3.00 (H) 02/19/2024     Lab Results   Component Value Date    TROP 1.060 (HH) 12/08/2015    TROP 1.480 (HH) 12/08/2015    TROP 2.230 (HH) 12/08/2015         No results found.       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 Reverified on the day of the procedure    Procedure Planned: DCCV    Sedation Plan: brevital    Discharge Plan: Same day      Pete Dee MD  Cardiac Electrophysiology  [facility]  3/4/2024  11:06 AM  [place]

## 2024-03-04 NOTE — PROGRESS NOTES
Pt arrived to holding for cardioversion. Pt placed on monitor and was sinus yash. EKG done to confirm. Finger stick INR was 5.0. Blood draw INR done. Dr. Dee aware and came to bedside to talk  to pt and . No cardioversion performed. Pt  contacted the coumadin clinic about today's  INR results and will adjust coumadin according to their recommendations. Pt placed in wheelchair and was discharged to [facility] by . Pt left with belongings.

## 2024-03-11 ENCOUNTER — HOSPITAL ENCOUNTER (OUTPATIENT)
Dept: LAB | Facility: HOSPITAL | Age: 79
Discharge: HOME | End: 2024-03-11
Attending: INTERNAL MEDICINE
Payer: MEDICARE

## 2024-03-11 DIAGNOSIS — I48.0 PAROXYSMAL ATRIAL FIBRILLATION (HCC): ICD-10-CM

## 2024-03-11 LAB — INR BLDC: 3.9 (ref 0.9–1.1)

## 2024-03-11 PROCEDURE — 85610 PROTHROMBIN TIME: CPT

## 2024-03-15 ENCOUNTER — HOSPITAL ENCOUNTER (OUTPATIENT)
Dept: LAB | Facility: HOSPITAL | Age: 79
Discharge: HOME | End: 2024-03-15
Attending: INTERNAL MEDICINE
Payer: MEDICARE

## 2024-03-15 DIAGNOSIS — I48.0 PAROXYSMAL ATRIAL FIBRILLATION (HCC): ICD-10-CM

## 2024-03-15 LAB — INR BLDC: 2.8 (ref 0.9–1.1)

## 2024-03-15 PROCEDURE — 85610 PROTHROMBIN TIME: CPT

## 2024-03-22 ENCOUNTER — HOSPITAL ENCOUNTER (OUTPATIENT)
Dept: LAB | Facility: HOSPITAL | Age: 79
Discharge: HOME | End: 2024-03-22
Attending: INTERNAL MEDICINE
Payer: MEDICARE

## 2024-03-22 DIAGNOSIS — I48.0 PAROXYSMAL ATRIAL FIBRILLATION (HCC): ICD-10-CM

## 2024-03-22 LAB — INR BLDC: 3.2 (ref 0.9–1.1)

## 2024-03-22 PROCEDURE — 85610 PROTHROMBIN TIME: CPT

## 2024-03-29 ENCOUNTER — HOSPITAL ENCOUNTER (OUTPATIENT)
Dept: LAB | Facility: HOSPITAL | Age: 79
Discharge: HOME | End: 2024-03-29
Attending: INTERNAL MEDICINE
Payer: MEDICARE

## 2024-03-29 DIAGNOSIS — I48.0 PAROXYSMAL ATRIAL FIBRILLATION (HCC): ICD-10-CM

## 2024-03-29 LAB — INR BLDC: 2.5 (ref 0.9–1.1)

## 2024-03-29 PROCEDURE — 85610 PROTHROMBIN TIME: CPT

## 2024-04-05 ENCOUNTER — HOSPITAL ENCOUNTER (OUTPATIENT)
Dept: LAB | Facility: HOSPITAL | Age: 79
Discharge: HOME | End: 2024-04-05
Attending: INTERNAL MEDICINE
Payer: MEDICARE

## 2024-04-05 DIAGNOSIS — I48.0 PAROXYSMAL ATRIAL FIBRILLATION (HCC): ICD-10-CM

## 2024-04-05 LAB — INR BLDC: 2.2 (ref 0.9–1.1)

## 2024-04-05 PROCEDURE — 85610 PROTHROMBIN TIME: CPT

## 2024-04-08 ENCOUNTER — HOSPITAL ENCOUNTER (OUTPATIENT)
Dept: LAB | Facility: HOSPITAL | Age: 79
Discharge: HOME | End: 2024-04-08
Attending: INTERNAL MEDICINE
Payer: MEDICARE

## 2024-04-08 DIAGNOSIS — I48.0 PAROXYSMAL ATRIAL FIBRILLATION (HCC): ICD-10-CM

## 2024-04-08 LAB — INR BLDC: 2.1 (ref 0.9–1.1)

## 2024-04-08 PROCEDURE — 85610 PROTHROMBIN TIME: CPT

## 2024-04-15 ENCOUNTER — HOSPITAL ENCOUNTER (OUTPATIENT)
Dept: LAB | Facility: HOSPITAL | Age: 79
Discharge: HOME | End: 2024-04-15
Attending: INTERNAL MEDICINE
Payer: MEDICARE

## 2024-04-15 DIAGNOSIS — I48.0 PAROXYSMAL ATRIAL FIBRILLATION (HCC): ICD-10-CM

## 2024-04-15 LAB — INR BLDC: 3.2 (ref 0.9–1.1)

## 2024-04-15 PROCEDURE — 85610 PROTHROMBIN TIME: CPT

## 2024-04-23 ENCOUNTER — HOSPITAL ENCOUNTER (OUTPATIENT)
Dept: LAB | Facility: HOSPITAL | Age: 79
Discharge: HOME | End: 2024-04-23
Attending: INTERNAL MEDICINE
Payer: MEDICARE

## 2024-04-23 DIAGNOSIS — I48.0 PAROXYSMAL ATRIAL FIBRILLATION (HCC): ICD-10-CM

## 2024-04-23 LAB — INR BLDC: 2.6 (ref 0.9–1.1)

## 2024-04-23 PROCEDURE — 85610 PROTHROMBIN TIME: CPT

## 2024-05-06 ENCOUNTER — HOSPITAL ENCOUNTER (OUTPATIENT)
Dept: LAB | Facility: HOSPITAL | Age: 79
Discharge: HOME | End: 2024-05-06
Attending: INTERNAL MEDICINE
Payer: MEDICARE

## 2024-05-06 DIAGNOSIS — I48.0 PAROXYSMAL ATRIAL FIBRILLATION (HCC): ICD-10-CM

## 2024-05-06 LAB — INR BLDC: 2.6 (ref 0.9–1.1)

## 2024-05-06 PROCEDURE — 85610 PROTHROMBIN TIME: CPT

## 2024-05-06 NOTE — IMAGING NOTE
Pt called and instructed to arrive 15 minutes prior to gated study scheduled on 5/8 1300.  Park in [facility], eat a light breakfast/lunch, hydrate, hold caffeine/decaff/chocolate x 12 hours prior, hold long acting nitrates, take all meds especially beta blockers prescribed.  Pt encouraged to call with further questions.  Reviewed pre allergy medications and admin times, patient and  well versed in these pre allergy medications.

## 2024-05-08 ENCOUNTER — APPOINTMENT (OUTPATIENT)
Dept: LAB | Facility: HOSPITAL | Age: 79
End: 2024-05-08
Attending: INTERNAL MEDICINE
Payer: MEDICARE

## 2024-05-08 ENCOUNTER — HOSPITAL ENCOUNTER (OUTPATIENT)
Dept: CT IMAGING | Facility: HOSPITAL | Age: 79
Discharge: HOME | End: 2024-05-08
Attending: INTERNAL MEDICINE
Payer: MEDICARE

## 2024-05-08 ENCOUNTER — HOSPITAL ENCOUNTER (OUTPATIENT)
Dept: LAB | Facility: HOSPITAL | Age: 79
Discharge: HOME | End: 2024-05-08
Attending: INTERNAL MEDICINE
Payer: MEDICARE

## 2024-05-08 VITALS — SYSTOLIC BLOOD PRESSURE: 128 MMHG | HEART RATE: 56 BPM | DIASTOLIC BLOOD PRESSURE: 66 MMHG

## 2024-05-08 DIAGNOSIS — I48.91 ATRIAL FIB/FLUTTER, TRANSIENT (HCC): ICD-10-CM

## 2024-05-08 DIAGNOSIS — I48.92 ATRIAL FIB/FLUTTER, TRANSIENT (HCC): ICD-10-CM

## 2024-05-08 LAB
ANION GAP SERPL CALC-SCNC: 9 MMOL/L (ref 0–18)
BUN BLD-MCNC: 18 MG/DL (ref 9–23)
CALCIUM BLD-MCNC: 9.4 MG/DL (ref 8.5–10.1)
CHLORIDE SERPL-SCNC: 107 MMOL/L (ref 98–112)
CO2 SERPL-SCNC: 23 MMOL/L (ref 21–32)
CREAT BLD-MCNC: 0.87 MG/DL
EGFRCR SERPLBLD CKD-EPI 2021: 68 ML/MIN/1.73M2 (ref 60–?)
ERYTHROCYTE [DISTWIDTH] IN BLOOD BY AUTOMATED COUNT: 15.4 %
FASTING STATUS PATIENT QL REPORTED: YES
GLUCOSE BLD-MCNC: 135 MG/DL (ref 70–99)
HCT VFR BLD AUTO: 41.4 %
HGB BLD-MCNC: 13.2 G/DL
MCH RBC QN AUTO: 28.5 PG (ref 26–34)
MCHC RBC AUTO-ENTMCNC: 31.9 G/DL (ref 31–37)
MCV RBC AUTO: 89.4 FL
OSMOLALITY SERPL CALC.SUM OF ELEC: 292 MOSM/KG (ref 275–295)
PLATELET # BLD AUTO: 162 10(3)UL (ref 150–450)
POTASSIUM SERPL-SCNC: 4.1 MMOL/L (ref 3.5–5.1)
RBC # BLD AUTO: 4.63 X10(6)UL
SODIUM SERPL-SCNC: 139 MMOL/L (ref 136–145)
WBC # BLD AUTO: 2.8 X10(3) UL (ref 4–11)

## 2024-05-08 PROCEDURE — 75572 CT HRT W/3D IMAGE: CPT | Performed by: INTERNAL MEDICINE

## 2024-05-08 PROCEDURE — 80048 BASIC METABOLIC PNL TOTAL CA: CPT | Performed by: INTERNAL MEDICINE

## 2024-05-08 PROCEDURE — 36415 COLL VENOUS BLD VENIPUNCTURE: CPT | Performed by: INTERNAL MEDICINE

## 2024-05-08 PROCEDURE — 85027 COMPLETE CBC AUTOMATED: CPT | Performed by: INTERNAL MEDICINE

## 2024-05-08 NOTE — IMAGING NOTE
Angeles Anand to CT Rm 4 GE.   Positioned pt on table. Procedure explained and questions answered. Vital signs monitored and noted in Flowsheet.  GFR = 64    Contrast = 75ml  0.9 NS flush = 71ml  Average HR = 52    Patient tolerated the procedure without complication. Denies any contrast reaction. Discontinued IV saline lock.   Escorted pt to Women's Dressing Room and discharged accompanied by .

## 2024-05-13 RX ORDER — ATORVASTATIN CALCIUM 20 MG/1
20 TABLET, FILM COATED ORAL NIGHTLY
COMMUNITY

## 2024-05-13 RX ORDER — CHOLECALCIFEROL (VITAMIN D3) 125 MCG
500 CAPSULE ORAL EVERY OTHER DAY
COMMUNITY

## 2024-05-13 NOTE — PAT NURSING NOTE
Per PAT encounter/ took notes:    Spoke with pt/; changes/updates to chart as noted. Discussed phone call after 3pm for TOA, antibacterial soap washing night before or morning of procedure cleaning groins, when to be NPO (10pm Tuesday), which meds to hold (Eplerenone, vitamins, herbal supplements, minerals, OTC), take meds allowed to take with sip of water, take allergy prep (13 hrs, 7 hours, 1hr prior to procedure) as instructed (if 6am arrival, 6:30pm Tues, 12:30am, bring third dose to hospital), main entrance arrive, check into heart hospital reception desk, length of procedure and length to expect to be at hospital if all goes well, etc. All questions answered and pt/ verbalized understanding of conversation.

## 2024-05-14 ENCOUNTER — ANESTHESIA EVENT (OUTPATIENT)
Dept: INTERVENTIONAL RADIOLOGY/VASCULAR | Facility: HOSPITAL | Age: 79
End: 2024-05-14

## 2024-05-15 ENCOUNTER — HOSPITAL ENCOUNTER (OUTPATIENT)
Dept: INTERVENTIONAL RADIOLOGY/VASCULAR | Facility: HOSPITAL | Age: 79
Discharge: HOME | End: 2024-05-16
Attending: INTERNAL MEDICINE | Admitting: INTERNAL MEDICINE

## 2024-05-15 ENCOUNTER — ANESTHESIA (OUTPATIENT)
Dept: INTERVENTIONAL RADIOLOGY/VASCULAR | Facility: HOSPITAL | Age: 79
End: 2024-05-15

## 2024-05-15 DIAGNOSIS — I48.91 A-FIB (HCC): ICD-10-CM

## 2024-05-15 LAB
ATRIAL RATE: 51 BPM
INR CARTRIDGE LOT #: ABNORMAL
INR: 2.5 (ref 0.8–1.3)
ISTAT ACTIVATED CLOTTING TIME: 282 SECONDS (ref 74–137)
ISTAT ACTIVATED CLOTTING TIME: 304 SECONDS (ref 74–137)
ISTAT ACTIVATED CLOTTING TIME: 336 SECONDS (ref 74–137)
P AXIS: -17 DEGREES
P-R INTERVAL: 192 MS
Q-T INTERVAL: 630 MS
QRS DURATION: 182 MS
QTC CALCULATION (BEZET): 580 MS
R AXIS: 163 DEGREES
T AXIS: -40 DEGREES
VENTRICULAR RATE: 51 BPM

## 2024-05-15 PROCEDURE — 93010 ELECTROCARDIOGRAM REPORT: CPT | Performed by: INTERNAL MEDICINE

## 2024-05-15 PROCEDURE — 93656 COMPRE EP EVAL ABLTJ ATR FIB: CPT | Performed by: INTERNAL MEDICINE

## 2024-05-15 PROCEDURE — 85610 PROTHROMBIN TIME: CPT | Performed by: INTERNAL MEDICINE

## 2024-05-15 PROCEDURE — 93657 TX L/R ATRIAL FIB ADDL: CPT | Performed by: INTERNAL MEDICINE

## 2024-05-15 PROCEDURE — 93655 ICAR CATH ABLTJ DSCRT ARRHYT: CPT | Performed by: INTERNAL MEDICINE

## 2024-05-15 PROCEDURE — 85347 COAGULATION TIME ACTIVATED: CPT

## 2024-05-15 PROCEDURE — 93005 ELECTROCARDIOGRAM TRACING: CPT

## 2024-05-15 RX ORDER — PANTOPRAZOLE SODIUM 40 MG/1
40 TABLET, DELAYED RELEASE ORAL
Status: DISCONTINUED | OUTPATIENT
Start: 2024-05-16 | End: 2024-05-16

## 2024-05-15 RX ORDER — NALOXONE HYDROCHLORIDE 0.4 MG/ML
80 INJECTION, SOLUTION INTRAMUSCULAR; INTRAVENOUS; SUBCUTANEOUS AS NEEDED
Status: DISCONTINUED | OUTPATIENT
Start: 2024-05-15 | End: 2024-05-15 | Stop reason: HOSPADM

## 2024-05-15 RX ORDER — MIDAZOLAM HYDROCHLORIDE 1 MG/ML
1 INJECTION INTRAMUSCULAR; INTRAVENOUS EVERY 5 MIN PRN
Status: DISCONTINUED | OUTPATIENT
Start: 2024-05-15 | End: 2024-05-15 | Stop reason: HOSPADM

## 2024-05-15 RX ORDER — DIPHENHYDRAMINE HYDROCHLORIDE 50 MG/ML
INJECTION INTRAMUSCULAR; INTRAVENOUS AS NEEDED
Status: DISCONTINUED | OUTPATIENT
Start: 2024-05-15 | End: 2024-05-15 | Stop reason: SURG

## 2024-05-15 RX ORDER — AMIODARONE HYDROCHLORIDE 100 MG/1
100 TABLET ORAL DAILY
COMMUNITY

## 2024-05-15 RX ORDER — HEPARIN SODIUM 1000 [USP'U]/ML
INJECTION, SOLUTION INTRAVENOUS; SUBCUTANEOUS AS NEEDED
Status: DISCONTINUED | OUTPATIENT
Start: 2024-05-15 | End: 2024-05-15 | Stop reason: SURG

## 2024-05-15 RX ORDER — PANTOPRAZOLE SODIUM 40 MG/1
40 TABLET, DELAYED RELEASE ORAL DAILY
Qty: 30 TABLET | Refills: 0 | Status: SHIPPED | OUTPATIENT
Start: 2024-05-15

## 2024-05-15 RX ORDER — HYDROMORPHONE HYDROCHLORIDE 1 MG/ML
0.6 INJECTION, SOLUTION INTRAMUSCULAR; INTRAVENOUS; SUBCUTANEOUS EVERY 5 MIN PRN
Status: DISCONTINUED | OUTPATIENT
Start: 2024-05-15 | End: 2024-05-15 | Stop reason: HOSPADM

## 2024-05-15 RX ORDER — HYDROMORPHONE HYDROCHLORIDE 1 MG/ML
0.2 INJECTION, SOLUTION INTRAMUSCULAR; INTRAVENOUS; SUBCUTANEOUS EVERY 5 MIN PRN
Status: DISCONTINUED | OUTPATIENT
Start: 2024-05-15 | End: 2024-05-15 | Stop reason: HOSPADM

## 2024-05-15 RX ORDER — HYDROCODONE BITARTRATE AND ACETAMINOPHEN 5; 325 MG/1; MG/1
1 TABLET ORAL ONCE AS NEEDED
Status: DISCONTINUED | OUTPATIENT
Start: 2024-05-15 | End: 2024-05-15 | Stop reason: HOSPADM

## 2024-05-15 RX ORDER — METOCLOPRAMIDE HYDROCHLORIDE 5 MG/ML
INJECTION INTRAMUSCULAR; INTRAVENOUS AS NEEDED
Status: DISCONTINUED | OUTPATIENT
Start: 2024-05-15 | End: 2024-05-15 | Stop reason: SURG

## 2024-05-15 RX ORDER — ONDANSETRON 2 MG/ML
4 INJECTION INTRAMUSCULAR; INTRAVENOUS EVERY 6 HOURS PRN
Status: DISCONTINUED | OUTPATIENT
Start: 2024-05-15 | End: 2024-05-15 | Stop reason: HOSPADM

## 2024-05-15 RX ORDER — EPHEDRINE SULFATE 50 MG/ML
INJECTION INTRAVENOUS AS NEEDED
Status: DISCONTINUED | OUTPATIENT
Start: 2024-05-15 | End: 2024-05-15 | Stop reason: SURG

## 2024-05-15 RX ORDER — LIDOCAINE HYDROCHLORIDE 10 MG/ML
INJECTION, SOLUTION EPIDURAL; INFILTRATION; INTRACAUDAL; PERINEURAL
Status: COMPLETED
Start: 2024-05-15 | End: 2024-05-15

## 2024-05-15 RX ORDER — HEPARIN SODIUM 1000 [USP'U]/ML
INJECTION, SOLUTION INTRAVENOUS; SUBCUTANEOUS
Status: COMPLETED
Start: 2024-05-15 | End: 2024-05-15

## 2024-05-15 RX ORDER — HYDROMORPHONE HYDROCHLORIDE 1 MG/ML
0.4 INJECTION, SOLUTION INTRAMUSCULAR; INTRAVENOUS; SUBCUTANEOUS EVERY 5 MIN PRN
Status: DISCONTINUED | OUTPATIENT
Start: 2024-05-15 | End: 2024-05-15 | Stop reason: HOSPADM

## 2024-05-15 RX ORDER — WARFARIN SODIUM 1 MG/1
1 TABLET ORAL
Status: COMPLETED | OUTPATIENT
Start: 2024-05-15 | End: 2024-05-15

## 2024-05-15 RX ORDER — ALBUTEROL SULFATE 2.5 MG/3ML
2.5 SOLUTION RESPIRATORY (INHALATION) AS NEEDED
Status: DISCONTINUED | OUTPATIENT
Start: 2024-05-15 | End: 2024-05-15 | Stop reason: HOSPADM

## 2024-05-15 RX ORDER — HYDROCODONE BITARTRATE AND ACETAMINOPHEN 5; 325 MG/1; MG/1
2 TABLET ORAL ONCE AS NEEDED
Status: DISCONTINUED | OUTPATIENT
Start: 2024-05-15 | End: 2024-05-15 | Stop reason: HOSPADM

## 2024-05-15 RX ORDER — SODIUM CHLORIDE, SODIUM LACTATE, POTASSIUM CHLORIDE, CALCIUM CHLORIDE 600; 310; 30; 20 MG/100ML; MG/100ML; MG/100ML; MG/100ML
INJECTION, SOLUTION INTRAVENOUS CONTINUOUS
Status: DISCONTINUED | OUTPATIENT
Start: 2024-05-15 | End: 2024-05-15 | Stop reason: HOSPADM

## 2024-05-15 RX ORDER — AMIODARONE HYDROCHLORIDE 200 MG/1
TABLET ORAL
Qty: 40 TABLET | Refills: 3 | Status: SHIPPED | OUTPATIENT
Start: 2024-05-15

## 2024-05-15 RX ORDER — LIDOCAINE HYDROCHLORIDE 10 MG/ML
INJECTION, SOLUTION EPIDURAL; INFILTRATION; INTRACAUDAL; PERINEURAL AS NEEDED
Status: DISCONTINUED | OUTPATIENT
Start: 2024-05-15 | End: 2024-05-15 | Stop reason: SURG

## 2024-05-15 RX ORDER — METOPROLOL TARTRATE 50 MG/1
50 TABLET, FILM COATED ORAL
Status: DISCONTINUED | OUTPATIENT
Start: 2024-05-15 | End: 2024-05-16

## 2024-05-15 RX ORDER — ATORVASTATIN CALCIUM 20 MG/1
20 TABLET, FILM COATED ORAL NIGHTLY
Status: DISCONTINUED | OUTPATIENT
Start: 2024-05-15 | End: 2024-05-16

## 2024-05-15 RX ORDER — PHENYLEPHRINE HCL 10 MG/ML
VIAL (ML) INJECTION AS NEEDED
Status: DISCONTINUED | OUTPATIENT
Start: 2024-05-15 | End: 2024-05-15 | Stop reason: SURG

## 2024-05-15 RX ORDER — LABETALOL HYDROCHLORIDE 5 MG/ML
5 INJECTION, SOLUTION INTRAVENOUS EVERY 5 MIN PRN
Status: DISCONTINUED | OUTPATIENT
Start: 2024-05-15 | End: 2024-05-15 | Stop reason: HOSPADM

## 2024-05-15 RX ORDER — METOCLOPRAMIDE HYDROCHLORIDE 5 MG/ML
10 INJECTION INTRAMUSCULAR; INTRAVENOUS EVERY 8 HOURS PRN
Status: DISCONTINUED | OUTPATIENT
Start: 2024-05-15 | End: 2024-05-15 | Stop reason: HOSPADM

## 2024-05-15 RX ORDER — ACETAMINOPHEN 325 MG/1
325 TABLET ORAL EVERY 6 HOURS PRN
Status: DISCONTINUED | OUTPATIENT
Start: 2024-05-15 | End: 2024-05-16

## 2024-05-15 RX ORDER — DEXAMETHASONE SODIUM PHOSPHATE 4 MG/ML
VIAL (ML) INJECTION AS NEEDED
Status: DISCONTINUED | OUTPATIENT
Start: 2024-05-15 | End: 2024-05-15 | Stop reason: SURG

## 2024-05-15 RX ORDER — AMIODARONE HYDROCHLORIDE 100 MG/1
100 TABLET ORAL DAILY
Status: DISCONTINUED | OUTPATIENT
Start: 2024-05-15 | End: 2024-05-16

## 2024-05-15 RX ORDER — ROCURONIUM BROMIDE 10 MG/ML
INJECTION, SOLUTION INTRAVENOUS AS NEEDED
Status: DISCONTINUED | OUTPATIENT
Start: 2024-05-15 | End: 2024-05-15 | Stop reason: SURG

## 2024-05-15 RX ORDER — NEOSTIGMINE METHYLSULFATE 1 MG/ML
INJECTION, SOLUTION INTRAVENOUS AS NEEDED
Status: DISCONTINUED | OUTPATIENT
Start: 2024-05-15 | End: 2024-05-15 | Stop reason: SURG

## 2024-05-15 RX ORDER — WARFARIN SODIUM 1 MG/1
1.25 TABLET ORAL AS DIRECTED
COMMUNITY

## 2024-05-15 RX ORDER — ACETAMINOPHEN 500 MG
1000 TABLET ORAL ONCE AS NEEDED
Status: DISCONTINUED | OUTPATIENT
Start: 2024-05-15 | End: 2024-05-15 | Stop reason: HOSPADM

## 2024-05-15 RX ORDER — GLYCOPYRROLATE 0.2 MG/ML
INJECTION, SOLUTION INTRAMUSCULAR; INTRAVENOUS AS NEEDED
Status: DISCONTINUED | OUTPATIENT
Start: 2024-05-15 | End: 2024-05-15 | Stop reason: SURG

## 2024-05-15 RX ORDER — HEPARIN SODIUM 5000 [USP'U]/ML
INJECTION, SOLUTION INTRAVENOUS; SUBCUTANEOUS
Status: COMPLETED
Start: 2024-05-15 | End: 2024-05-15

## 2024-05-15 RX ORDER — EPLERENONE 25 MG/1
25 TABLET, FILM COATED ORAL DAILY
Status: DISCONTINUED | OUTPATIENT
Start: 2024-05-16 | End: 2024-05-16

## 2024-05-15 RX ORDER — ONDANSETRON 2 MG/ML
INJECTION INTRAMUSCULAR; INTRAVENOUS AS NEEDED
Status: DISCONTINUED | OUTPATIENT
Start: 2024-05-15 | End: 2024-05-15 | Stop reason: SURG

## 2024-05-15 RX ORDER — SODIUM CHLORIDE 9 MG/ML
INJECTION, SOLUTION INTRAVENOUS
Status: COMPLETED | OUTPATIENT
Start: 2024-05-16 | End: 2024-05-15

## 2024-05-15 RX ORDER — PROTAMINE SULFATE 10 MG/ML
INJECTION, SOLUTION INTRAVENOUS AS NEEDED
Status: DISCONTINUED | OUTPATIENT
Start: 2024-05-15 | End: 2024-05-15 | Stop reason: SURG

## 2024-05-15 RX ADMIN — ONDANSETRON 4 MG: 2 INJECTION INTRAMUSCULAR; INTRAVENOUS at 09:35:00

## 2024-05-15 RX ADMIN — PROTAMINE SULFATE 50 MG: 10 INJECTION, SOLUTION INTRAVENOUS at 09:40:00

## 2024-05-15 RX ADMIN — METOCLOPRAMIDE HYDROCHLORIDE 10 MG: 5 INJECTION INTRAMUSCULAR; INTRAVENOUS at 07:45:00

## 2024-05-15 RX ADMIN — PHENYLEPHRINE HCL 50 MCG: 10 MG/ML VIAL (ML) INJECTION at 08:00:00

## 2024-05-15 RX ADMIN — GLYCOPYRROLATE 0.8 MG: 0.2 INJECTION, SOLUTION INTRAMUSCULAR; INTRAVENOUS at 09:45:00

## 2024-05-15 RX ADMIN — ATORVASTATIN CALCIUM 20 MG: 20 TABLET, FILM COATED ORAL at 21:27:00

## 2024-05-15 RX ADMIN — HEPARIN SODIUM 7000 UNITS: 1000 INJECTION, SOLUTION INTRAVENOUS; SUBCUTANEOUS at 08:00:00

## 2024-05-15 RX ADMIN — LIDOCAINE HYDROCHLORIDE 50 MG: 10 INJECTION, SOLUTION EPIDURAL; INFILTRATION; INTRACAUDAL; PERINEURAL at 07:35:00

## 2024-05-15 RX ADMIN — SODIUM CHLORIDE: 9 INJECTION, SOLUTION INTRAVENOUS at 07:28:00

## 2024-05-15 RX ADMIN — SODIUM CHLORIDE: 9 INJECTION, SOLUTION INTRAVENOUS at 09:15:00

## 2024-05-15 RX ADMIN — DEXAMETHASONE SODIUM PHOSPHATE 4 MG: 4 MG/ML VIAL (ML) INJECTION at 07:45:00

## 2024-05-15 RX ADMIN — PHENYLEPHRINE HCL 100 MCG: 10 MG/ML VIAL (ML) INJECTION at 07:35:00

## 2024-05-15 RX ADMIN — GLYCOPYRROLATE 0.2 MG: 0.2 INJECTION, SOLUTION INTRAMUSCULAR; INTRAVENOUS at 07:50:00

## 2024-05-15 RX ADMIN — WARFARIN SODIUM 1 MG: 1 TABLET ORAL at 21:27:00

## 2024-05-15 RX ADMIN — NEOSTIGMINE METHYLSULFATE 5 MG: 1 INJECTION, SOLUTION INTRAVENOUS at 09:45:00

## 2024-05-15 RX ADMIN — HEPARIN SODIUM 3000 UNITS: 1000 INJECTION, SOLUTION INTRAVENOUS; SUBCUTANEOUS at 08:50:00

## 2024-05-15 RX ADMIN — ROCURONIUM BROMIDE 50 MG: 10 INJECTION, SOLUTION INTRAVENOUS at 07:35:00

## 2024-05-15 RX ADMIN — EPHEDRINE SULFATE 2.5 MG: 50 INJECTION INTRAVENOUS at 08:10:00

## 2024-05-15 RX ADMIN — DIPHENHYDRAMINE HYDROCHLORIDE 50 MG: 50 INJECTION INTRAMUSCULAR; INTRAVENOUS at 07:30:00

## 2024-05-15 NOTE — ANESTHESIA PREPROCEDURE EVALUATION
PRE-OP EVALUATION    Patient Name: Angeles Anand    Admit Diagnosis: A-fib (HCC) [I48.91]    Pre-op Diagnosis: * No pre-op diagnosis entered *        Anesthesia Procedure: CATH EP    * No surgeons found in log *    Pre-op vitals reviewed.  Temp: 98 °F (36.7 °C)  Pulse: 53  Resp: 20  BP: 144/67  SpO2: 96 %  Body mass index is 21.03 kg/m².    Current medications reviewed.  Hospital Medications:   [COMPLETED] sodium chloride 0.9% infusion   Intravenous On Call    [COMPLETED] heparin (Porcine) 5000 UNIT/ML injection        [COMPLETED] lidocaine PF (Xylocaine-MPF) 1 % injection        [COMPLETED] heparin (Porcine) 1000 UNIT/ML injection        diphenhydrAMINE (Benadryl) 50 mg/mL  injection   Intravenous PRN    fentaNYL (Sublimaze) 50 mcg/mL injection   Intravenous PRN    lidocaine PF (Xylocaine-MPF) 1% injection   Injection PRN    propofol (Diprivan) 10 MG/ML injection   Intravenous PRN    rocuronium (Zemuron) 50 mg/5mL injection   Intravenous PRN    phenylephrine (Robert-Synephrine) 10 MG/ML injection   Intravenous PRN    glycopyrrolate (Robinul) 0.2 MG/ML injection   Intravenous PRN    dexamethasone (Decadron) 4 MG/ML injection   Intravenous PRN    metoclopramide (Reglan) 5 mg/mL injection   Intravenous PRN    heparin (Porcine) 1000 UNIT/ML injection   Intravenous PRN       Outpatient Medications:     Medications Prior to Admission   Medication Sig Dispense Refill Last Dose    atorvastatin 20 MG Oral Tab Take 1 tablet (20 mg total) by mouth nightly.   5/14/2024    cyanocobalamin 500 MCG Oral Tab Take 1 tablet (500 mcg total) by mouth every other day.   5/14/2024    predniSONE 50 MG Oral Tab Take 1 tablet by mouth 13 hours, 7 hours, and 1 hour prior to CTA.   5/15/2024    Melatonin 5 MG Oral Tab Take 1 tablet (5 mg total) by mouth nightly as needed (sleep).   5/14/2024    acetaminophen 325 MG Oral Tab Take 1 tablet (325 mg total) by mouth every 6 (six) hours as needed for Pain.       eplerenone 25 MG Oral Tab Take 1  tablet (25 mg total) by mouth daily. 30 tablet 0 5/14/2024    metoprolol tartrate 50 MG Oral Tab Take 1 tablet (50 mg total) by mouth 2x Daily(Beta Blocker). 60 tablet 0 5/15/2024    amiodarone 200 MG Oral Tab 1 tab po bid x 7 days, then 1 tablet daily there after 40 tablet 3 5/15/2024    warfarin 2.5 MG Oral Tab Take 1 tablet (2.5 mg total) by mouth nightly. Take as directed by the warfarin clinic   5/14/2024    Calcium-Vitamin D (CALTRATE 600 PLUS-VIT D OR) Take 600 mg by mouth 2 (two) times daily.     5/14/2024       Allergies: Radiology contrast iodinated dyes, Gadolinium, and Kiwi extract      Anesthesia Evaluation    Patient summary reviewed.    Anesthetic Complications  (-) history of anesthetic complications         GI/Hepatic/Renal    Negative GI/hepatic/renal ROS.                             Cardiovascular    Negative cardiovascular ROS.    Exercise tolerance: good     MET: >4      (+) hypertension   (+) hyperlipidemia  (+) CAD    (+) CABG/stent    (+) valvular problems/murmurs (s/p AVR)     (+) dysrhythmias and atrial fibrillation  (+) CHF                Endo/Other    Negative endo/other ROS.                              Pulmonary    Negative pulmonary ROS.                       Neuro/Psych    Negative neuro/psych ROS.                                  Past Surgical History:   Procedure Laterality Date    Angiogram      Appendectomy      Appendectomy      Cabg  2004    Hysterectomy  1997    MIKE/BSO- fibroid uterus    Lumpectomy left  Jan. 2013    invasive ductal    Yue localization wire 1 site left (cpt=19281)  1998;1996    Needle biopsy left  2012    us core bx-papilloma    Needle biopsy right  2/2013    mri bx-sclerosing adenosis    Oophorectomy  1997    Other  2015    thoracic aortic aneurysm repair    Other surgical history  2004    Surgery for aortic dissection- aortic root repair, aortic valve replacement, single CABG    Other surgical history       left breast biopsy in 1996 and 1998    Other  surgical history  3/1/2013    re-excision left lumpectomy    Radiation left  2013     Social History     Socioeconomic History    Marital status:    Tobacco Use    Smoking status: Former     Current packs/day: 0.00     Types: Cigarettes     Quit date: 1988     Years since quittin.3    Smokeless tobacco: Never   Vaping Use    Vaping status: Never Used   Substance and Sexual Activity    Alcohol use: Not Currently    Drug use: No   Other Topics Concern    Seat Belt Yes     History   Drug Use No     Available pre-op labs reviewed.  Lab Results   Component Value Date    WBC 2.8 (L) 2024    RBC 4.63 2024    HGB 13.2 2024    HCT 41.4 2024    MCV 89.4 2024    MCH 28.5 2024    MCHC 31.9 2024    RDW 15.4 2024    .0 2024     Lab Results   Component Value Date     2024    K 4.1 2024     2024    CO2 23.0 2024    BUN 18 2024    CREATSERUM 0.87 2024     (H) 2024    CA 9.4 2024     Lab Results   Component Value Date    INR 2.5 (A) 05/15/2024         Airway      Mallampati: II  Mouth opening: 3 FB  TM distance: > 6 cm  Neck ROM: full Cardiovascular    Cardiovascular exam normal.  Rhythm: regular  Rate: normal     Dental    Dentition appears grossly intact         Pulmonary    Pulmonary exam normal.  Breath sounds clear to auscultation bilaterally.               Other findings              ASA: 3   Plan: general  NPO status verified and patient meets guidelines.  Patient has not taken beta blockers in last 24 hours.  Post-procedure pain management plan discussed with surgeon and patient.    Comment: I spoke with the patient and discussed the risks of general anesthesia, which include nausea and vomiting; sore throat; injury to the lips, gums, teeth, and eyes; cardiac, pulmonary, and neurologic events; aspiration; and allergic reactions. The patient understands these risks and consents to  receiving general anesthesia for this procedure.  Plan/risks discussed with: patient and spouse  Use of blood product(s) discussed with: patient    Consented to blood products.          Present on Admission:  **None**

## 2024-05-15 NOTE — DISCHARGE INSTRUCTIONS
HOME CARE INSTRUCTIONS FOLLOWING CARDIAC ABLATION (RFA) OR ELECTOPHYSIOLOGY STUDY (EPS)     ACTIVITY:  ~ DO NOT drive after the procedure. You may resume driving on Friday.   ~ Plan on resting and relaxing tonight and tomorrow. It will be normal to tire easily for the first few days, depending on the length of your procedure and the amount of sedation you received.   ~Do not lift anything over 10 pounds for the next 48 hours and 20 pounds for a week.   ~Avoid sexual activity for the first 24-48 hours.  ~Avoid repeated stair use and excessive walking for the first 24-48 hours.   ~Avoid alcohol for the next 24 hours.  ~Resume your normal activity as tolerated in 48 hours, or as instructed by your physician.     WHAT IS NORMAL:  ~You may feel extra heart beats. If these beats come too often, or you feel an episode of multiple fast heart beats, notify your physician.  ~The procedure sight may appear bruised or discolored.   ~There may be a small amount of drainage on the bandage.  ~There may be mild tenderness to the procedure site when touched. This is common.     SPECIAL INSTRUCTIONS:  ~The bandage is to remain in place for 24 hours. Keep the bandage clean and dry. You may shower the next day (after 24 hours.) Do not submerge the site for 72 hours (no tub baths or pools)   ~After 24 hours, you must remove the bandage. Wash the procedure site gently with soap and water. Do not scrub. (If you chose to wear a bandage for a few days, make sure it remains clean and dry and change it daily)    WHEN YOU SHOULD NOTIFY YOUR PHYSICIAN:  ~If you have shortness of breath or a persistent cough.  ~If you have chest pains (slight discomfort in chest may be normal for 24 hours)   ~If you have persistent pain at the procedure site.   ~If you experience of a fever, temperature greater than 101, chills, infection (redness, swelling, thick yellow drainage, or a foul odor from the procedure site)    OTHER:  ~You may resume your present  diet.  ~You may resume all of your medications as prescribed, unless otherwise directed by your physician. A list of medications was provided at discharge. Continue Coumadin tonight.   ~ Decrease amiodarone to 100 mg a day (1/2 tab)   ~ Start Pantoprazole 40mg a day for 30 days. Prescription at your pharmacy.   ~Follow up in Dr Dee's office on 6/6 at 9:00AM.

## 2024-05-15 NOTE — ANESTHESIA POSTPROCEDURE EVALUATION
[facility]    Angeles Anand Patient Status:  Outpatient   Age/Gender 79 year old female MRN [id number]   Location [facility] POST ANESTHESIA CARE UNIT Attending Pete Dee MD   Hosp Day # 0 PCP None Pcp       Anesthesia Post-op Note        Procedure Summary       Date: 05/15/24 Room / Location: [facility] Interventional Suites    Anesthesia Start: 0728 Anesthesia Stop: 1020    Procedure: CATH EP Diagnosis:       A-fib (HCC)      A-fib (HCC)    Scheduled Providers: Robbie Haider MD Anesthesiologist: Robbie Haider MD    Anesthesia Type: general ASA Status: 3            Anesthesia Type: general    Vitals Value Taken Time   /63 05/15/24 1022   Temp 97.7 °F (36.5 °C) 05/15/24 1022   Pulse 51 05/15/24 1022   Resp 18 05/15/24 1022   SpO2 98 % 05/15/24 1022   Vitals shown include unfiled device data.    Patient Location: PACU    Anesthesia Type: general    Airway Patency: patent and extubated    Postop Pain Control: adequate    Mental Status: mildly sedated but able to meaningfully participate in the post-anesthesia evaluation    Nausea/Vomiting: none    Cardiopulmonary/Hydration status: stable euvolemic    Complications: no apparent anesthesia related complications    Postop vital signs: stable    Dental Exam: Unchanged from Preop    Patient to be discharged from PACU when criteria met.

## 2024-05-15 NOTE — PROCEDURES
Procedure Note    Angeles Anand Location: Cath Lab   CSN [id number] MRN [id number]   Admission Date 5/15/2024  Operation Date 05/15/24    Attending Physician Pete Dee MD Operating Physician Pete Dee MD     Pre-Operative Diagnosis: Atrial fibrillation    Post-Operative Diagnosis: Same as above    Procedure Performed: EP & ABLATE SUPRAVENT ARRHYTHMIA/Atrial Fibrillation  1. Comprehensive electrophysiology study.   2. Coronary sinus catheter placement to record left atrial electrograms and pace the left atrium. .    3. Three-dimensional intracardiac mapping.   4. Intracardiac echocardiography (ICE).   5. Transseptal catheterization.   6. Radiofrequency Pulmonary Vein Isolation ablation for atrial fibrillation.   7. Venous closure with Vascade  8. Secondary Arrhythmia with distinct mechanism- CTI ablation for atrial flutter  9. Secondary lines for Atrial Fibrillation: none    Indication: Symptomatic paroxysmal atrial fibrillation.     EBL: Minimal    Summary of Case: The patient was brought to the EP lab in a fasting and   nonsedated state after providing informed consent. . The right and left groins were prepped   and draped in a sterile fashion.  Ultrasound-guided access was obtained.. Catheters were placed in the appropriate   Positions (HRA, CS, RV, HB) under ICE and 3D mapping guidance.   Sheaths:  8 Tamazight-Short sheath upsized for transeptal sheath and then the Vizigo sheath. Through this, we inserted an Octarray and then the Ablation catheter  10 fr: - Intracardiac Echo  7 fr: Decapolar catheter to the coronary sinus    An intracardiac echo catheter was placed in the mid right atrium and the  interatrial septum was clearly visualized. [employer] versacross SL-1 sheath was then advanced  in the right femoral vein over a long  guidewire to the SVC-RA junction.  The sheath, dilator  and needle were withdrawn in the 5-6 oclock position until tenting was clearly   visualized within the interatrial  septum. The RF wire was advanced   through the fossa ovalis, this was confirmed  by ICE. The dilator   and sheath were gently advanced over the Solway circular wire into the left atrium visualized by ICE.  . A heparin bolus was given prior to transeptal access and after femoral vein access   to maintain an ACT level of at least 300-350 seconds.    3-D Mapping:  A three-dimensional electroanatomic map was made using an Octarray multielectrode catheter.  From this mapping we found that 4 pulmonary veins.  With normal voltage in the left atrial posterior wall    RF- Ablation:  We utilized a high power short duration strategy with 50 W lesions, with contact force goals of > 10 grams and 10 ohm impedance drops.  In the anterior wall we used an index of:500-550.  On the posterior wall we used Qmode plus 90 W/4 second lesions with active esophageal cooling and slightly wider interspacing of lesions.  For the right atrial flutter ablation we performed the ablation with 40 W lesions with 10 ohm impedance drops of bidirectional block  An esophageal temperature probe/ Active Attune Cooling balloon was placed to monitor temperatures during RF energy delivery.    A High ventilation rate/low Tidal volume and atrial pacing was used during RF delivery to facilitate cathter stability.    All 4 pulmonary veins were isolated as demonstrated by entrance and exit block. This was confirmed by either differential pacing with the coronary sinus catheter (placed in the CS to record electrograms and pace the left atrium) and the circular pulmonary vein catheter. Catheter positions and cardiac anatomy was visualized by 3D mapping.   Protamine was given after a test dose, the sheaths were pulled and hemostasis was maintained with Vascade    ELECTROPHYSIOLOGY STUDY FINDINGS:   Sinus rhythm, cycle length 1100 ms,   NV 208ms,  ms, QT 480ms.   AH 100ms, HV 44ms.     CONCLUSIONS:   1. Sinus node function normal.   2. Atrioventricular node  function normal.   3. His-Purkinje function normal.   4. Pulmonary vein isolation was achieved with radiofrequency.    5. There was no pericardial effusion observed at the conclusion of   the procedure, confirmed by intracardiac echocardiography.  6.  Additional lesions for AF:None  7.  Additional arrhythmias with a distinct mechaniosm:Atrial Flutter- CTI ablation  8. Venous closure- Vascade    Complications:  None      Plan:    1) Continue oral anticoagulation Warfarin, uninterrupted.  2) Continue Amiodarone, but decrease to 1/2 tablet a day (100 mg)  3) Bedrest for 2 hrs  4) Pantoprazole 40 mg daily ( new prescription) - 1 month only  5) Follow up with me in 1 month  6) No TTE needed before discharge.               Peet Dee MD    Cardiac Electrophysiololgy  [facility] [place]  5/15/2024

## 2024-05-15 NOTE — PROGRESS NOTES
Rc'd pt from PACU s/p RFA in stable condition. VSS. Vascade x 3 to right groin is soft, clean and dry. No bleeding or hematoma. Pt denies c/o pain or discomfort. Dr Dee at bedside along with pts . Pt tolerating po intake well.     12:00: Pt sitting up in bed, dressing is saturated with bright red blood. Manual pressure applied for 20 mins with quick clot. Site is stable. Reviewed discharge instructions with pt and , verbalizes understanding.     14:30: After an hour of bedrest, pt ambulated to bathroom and started to ooze from right groin. Voided. Manual pressure was again held for 20 mins and a quick clot applied. Pt has 16 steps at home and they prefer she spend the night. Dr Dee at bedside. Ok to admit.     14:45: Report called to HEIDI Thompson. Pt transported to [facility] in stable condition. Groin stable.

## 2024-05-15 NOTE — PLAN OF CARE
Received from PACU at 1500. Pt had Ablation this AM with Dr. Dee. R groin accessed with bleeding this am. Pt groin on arrival with quick clot, soft, CDI, no signs of hematoma. 2+ pulses. Pt to remain on bedrest overnight. Purewick in place for Voiding. BM  yesterday. Pt usually wears back brace for ambulating long distances for t8 fx. SB on tele currently. Lungs clear on RA. L arm restriction. Family at bedside. Bed alarm on for safety.      Problem: Patient/Family Goals  Goal: Patient/Family Long Term Goal  Description: Patient's Long Term Goal: stay out of afib    Interventions:  - take meds as prescribed  -follow up appointments  - See additional Care Plan goals for specific interventions  Outcome: Progressing  Goal: Patient/Family Short Term Goal  Description: Patient's Short Term Goal: no bleeding    Interventions:   - monitor for signs/symptoms of bleeding  -bed rest overnight  -1/2 dose coumadin tonight    - See additional Care Plan goals for specific interventions  Outcome: Progressing     Problem: CARDIOVASCULAR - ADULT  Goal: Maintains optimal cardiac output and hemodynamic stability  Description: INTERVENTIONS:  - Monitor vital signs, rhythm, and trends  - Monitor for bleeding, hypotension and signs of decreased cardiac output  - Evaluate effectiveness of vasoactive medications to optimize hemodynamic stability  - Monitor arterial and/or venous puncture sites for bleeding and/or hematoma  - Assess quality of pulses, skin color and temperature  - Assess for signs of decreased coronary artery perfusion - ex. Angina  - Evaluate fluid balance, assess for edema, trend weights  Outcome: Progressing  Goal: Absence of cardiac arrhythmias or at baseline  Description: INTERVENTIONS:  - Continuous cardiac monitoring, monitor vital signs, obtain 12 lead EKG if indicated  - Evaluate effectiveness of antiarrhythmic and heart rate control medications as ordered  - Initiate emergency measures for life threatening  arrhythmias  - Monitor electrolytes and administer replacement therapy as ordered  Outcome: Progressing

## 2024-05-15 NOTE — ANESTHESIA PROCEDURE NOTES
Arterial Line    Date/Time: 5/15/2024 7:40 AM    Performed by: Robbie Haider MD  Authorized by: Robbie Haider MD    General Information and Staff    Procedure Start:  5/15/2024 7:40 AM  Procedure End:  5/15/2024 7:42 AM  Anesthesiologist:  Robbie Haider MD  Performed By:  Anesthesiologist  Patient Location:  OR  Indication: continuous blood pressure monitoring and blood sampling needed    Site Identification: real time ultrasound guided    Preanesthetic Checklist: 2 patient identifiers, IV checked, risks and benefits discussed, monitors and equipment checked, pre-op evaluation, timeout performed, anesthesia consent and sterile technique used    Procedure Details    Catheter Size:  20 G  Catheter Length:  1 and 3/4 inch  Catheter Type:  Arrow  Seldinger Technique?: Yes    Laterality:  Right  Site:  Radial artery  Site Prep: chlorhexidine    Line Secured:  Wrist Brace, tape and Tegaderm    Assessment    Events: patient tolerated procedure well with no complications      Medications  5/15/2024 7:40 AM      Additional Comments

## 2024-05-15 NOTE — H&P
[facility]  Pre Procedure History and Physical    Angeles Anand Patient Status:  Outpatient    3/6/1945 MRN [id number]   Location [facility] INTERVENTIONAL SUITES Attending Pete Dee MD   Hosp Day # 0 PCP None Pcp     Consults      History of Present Illness:  Angeles Anand is a a(n) 79 year old female here for RF PVI and RF CTI      Prior H/P Reviewed with No changes    History:  Past Medical History:    Arrhythmia    ATRIAL FIBRILLATION    Dr. 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     Cancer (HCC)    CORONARY ARTERY DISEASE    Endocrine disorder    Gout    History of blood transfusion    Hypertension    Lymphedema of upper extremity following lymphadenectomy    Migraines    Osteopenia    Other and unspecified hyperlipidemia    Unspecified essential hypertension    Visual impairment     Past Surgical History:   Procedure Laterality Date    Angiogram      Appendectomy      Appendectomy      Cabg      Hysterectomy      MIKE/BSO- fibroid uterus    Lumpectomy left  2013    invasive ductal    Yue localization wire 1 site left (cpt=19281)  ;    Needle biopsy left      us core bx-papilloma    Needle biopsy right  2013    mri bx-sclerosing adenosis    Oophorectomy      Other  2015    thoracic aortic aneurysm repair    Other surgical history  2004    Surgery for aortic dissection- aortic root repair, aortic valve replacement, single CABG    Other surgical history       left breast biopsy in  and     Other surgical history  3/1/2013    re-excision left lumpectomy    Radiation left  2013     Family History   Problem Relation Age of Onset    Heart Disorder Father     Diabetes Mother     Breast Cancer Self 68      reports that she quit smoking about 36 years ago. Her smoking use included cigarettes. She has never used smokeless tobacco. She reports that she does not currently use alcohol. She  reports that she does not use drugs.    Allergies:  Allergies   Allergen Reactions    Radiology Contrast Iodinated Dyes HIVES and RASH    Gadolinium     Kiwi Extract SWELLING       Medications:    Current Facility-Administered Medications:     [START ON 2024] sodium chloride 0.9% infusion, , Intravenous, On Call    OBJECTIVE      Physical Exam:  Physical Exam   Blood pressure 144/67, pulse 53, temperature 98 °F (36.7 °C), temperature source Temporal, resp. rate 20, height 5' 2\" (1.575 m), weight 115 lb (52.2 kg), SpO2 96%, not currently breastfeeding.  Temp (24hrs), Av °F (36.7 °C), Min:98 °F (36.7 °C), Max:98 °F (36.7 °C)    Wt Readings from Last 3 Encounters:   24 115 lb (52.2 kg)   24 118 lb (53.5 kg)   24 117 lb 9.6 oz (53.3 kg)       NAD  PERRLA/EOMI  Neck veins not elevated  Carotids- no bruits  CTA bilaterally  Cardiac- RRR  Abdomen- Soft,Nontender, normal BS  Extremities- pulses normal  Edema-   Mood /Affect Congruent  Skin- no lesions          Results:   Recent Labs   Lab 24  1024   *   BUN 18   CREATSERUM 0.87   EGFRCR 68   CA 9.4      K 4.1      CO2 23.0     Recent Labs   Lab 24  1024   RBC 4.63   HGB 13.2   HCT 41.4   MCV 89.4   MCH 28.5   MCHC 31.9   RDW 15.4   WBC 2.8*   .0         [unfilled]  No results for input(s): \"BNP\" in the last 168 hours.  Lab Results   Component Value Date    PT 21.9 (H) 2013    PT 20.5 (H) 2013    PT 23.9 (H) 2013    INR 2.5 (A) 05/15/2024    INR 2.60 (H) 2024    INR 2.60 (H) 2024     Lab Results   Component Value Date    TROP 1.060 (HH) 2015    TROP 1.480 (HH) 2015    TROP 2.230 (HH) 2015         No results found.       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 Reverified on the day of the procedure    Procedure Planned: RF PVI and RF CTI    Sedation Plan: GETA/Attune    Discharge Plan: same day      Pete Dee MD  Cardiac Electrophysiology  [facility] [place]  5/15/2024  7:35 AM

## 2024-05-15 NOTE — PROGRESS NOTES
Post RFA Afib. On warfarin.  Pt had oozing from venous sited post ablation.  Will admit overnight.     Plan- Continue all home meds  with following modifications:    1) Protonix 40 mg daily x 1 month only  2) Decrease amiodarone to 100 mg daily  3.) Give half dose of her usual warfarin tonight and resume usual dose Thursday night.  4) Bedrest overnight.   5.) Home tomorrow      _______________________________________  Pete Dee MD  Cardiac Electrophysiololgy  [facility]  5/15/2024

## 2024-05-15 NOTE — ANESTHESIA PROCEDURE NOTES
Airway  Date/Time: 5/15/2024 7:38 AM  Urgency: elective      General Information and Staff    Patient location during procedure: OR  Anesthesiologist: Robbie Haider MD  Performed: anesthesiologist   Performed by: Robbie Haider MD  Authorized by: Robbie Haider MD      Indications and Patient Condition  Indications for airway management: anesthesia  Sedation level: deep  Preoxygenated: yes  Patient position: sniffing  Mask difficulty assessment: 1 - vent by mask    Final Airway Details  Final airway type: endotracheal airway      Successful airway: ETT  Cuffed: yes   Successful intubation technique: Video laryngoscopy  Facilitating devices/methods: intubating stylet  Endotracheal tube insertion site: oral  Blade: GlideScope  Blade size: #3  ETT size (mm): 7.0    Cormack-Lehane Classification: grade I - full view of glottis  Placement verified by: capnometry   Measured from: lips  ETT to lips (cm): 22  Number of attempts at approach: 1  Number of other approaches attempted: 1    Other Attempts  Unsuccessful attempted endotracheal techniques: direct laryngoscopy (G3V with Mac 3)

## 2024-05-16 VITALS
OXYGEN SATURATION: 95 % | BODY MASS INDEX: 21.16 KG/M2 | RESPIRATION RATE: 20 BRPM | TEMPERATURE: 98 F | DIASTOLIC BLOOD PRESSURE: 62 MMHG | WEIGHT: 115 LBS | HEART RATE: 60 BPM | HEIGHT: 62 IN | SYSTOLIC BLOOD PRESSURE: 122 MMHG

## 2024-05-16 LAB
INR BLD: 3.05 (ref 0.8–1.2)
PROTHROMBIN TIME: 32 SECONDS (ref 11.6–14.8)

## 2024-05-16 PROCEDURE — 4A0234Z MEASUREMENT OF CARDIAC ELECTRICAL ACTIVITY, PERCUTANEOUS APPROACH: ICD-10-PCS | Performed by: INTERNAL MEDICINE

## 2024-05-16 PROCEDURE — 4A023FZ MEASUREMENT OF CARDIAC RHYTHM, PERCUTANEOUS APPROACH: ICD-10-PCS | Performed by: INTERNAL MEDICINE

## 2024-05-16 PROCEDURE — 85610 PROTHROMBIN TIME: CPT | Performed by: NURSE PRACTITIONER

## 2024-05-16 PROCEDURE — 02K83ZZ MAP CONDUCTION MECHANISM, PERCUTANEOUS APPROACH: ICD-10-PCS | Performed by: INTERNAL MEDICINE

## 2024-05-16 PROCEDURE — 02583ZZ DESTRUCTION OF CONDUCTION MECHANISM, PERCUTANEOUS APPROACH: ICD-10-PCS | Performed by: INTERNAL MEDICINE

## 2024-05-16 RX ORDER — METOPROLOL TARTRATE 50 MG/1
25 TABLET, FILM COATED ORAL
Qty: 60 TABLET | Refills: 1 | Status: SHIPPED | OUTPATIENT
Start: 2024-05-16

## 2024-05-16 RX ADMIN — METOPROLOL TARTRATE 50 MG: 50 TABLET, FILM COATED ORAL at 06:07:00

## 2024-05-16 RX ADMIN — PANTOPRAZOLE SODIUM 40 MG: 40 TABLET, DELAYED RELEASE ORAL at 06:07:00

## 2024-05-16 RX ADMIN — AMIODARONE HYDROCHLORIDE 100 MG: 100 TABLET ORAL at 08:14:00

## 2024-05-16 RX ADMIN — EPLERENONE 25 MG: 25 TABLET, FILM COATED ORAL at 08:14:00

## 2024-05-16 NOTE — PROGRESS NOTES
Progress Note  Angeles Anand Patient Status:  Outpatient in a Bed    3/6/1945 MRN [id number]   Location [facility] 8NE-A Attending Pete Dee MD   Hosp Day # 0 PCP None Pcp     Subjective:  Walked around the unit, no complaints    Objective:  /48 (BP Location: Right arm)   Pulse 64   Temp 97.6 °F (36.4 °C) (Oral)   Resp 20   Ht 5' 2\" (1.575 m)   Wt 115 lb (52.2 kg)   SpO2 94%   BMI 21.03 kg/m²     Telemetry: SB/SR, PAC's      Intake/Output: +1800    Intake/Output Summary (Last 24 hours) at 2024 1037  Last data filed at 2024 1022  Gross per 24 hour   Intake 1050 ml   Output 450 ml   Net 600 ml       Last 3 Weights   24 1316 115 lb (52.2 kg)   24 1310 118 lb (53.5 kg)   24 0600 117 lb 9.6 oz (53.3 kg)   24 0502 117 lb 12.8 oz (53.4 kg)   02/10/24 0407 118 lb 9.7 oz (53.8 kg)   24 0612 120 lb 9.5 oz (54.7 kg)   24 0611 120 lb 9.5 oz (54.7 kg)   24 0515 123 lb 3.8 oz (55.9 kg)   24 2032 126 lb 5.2 oz (57.3 kg)       Labs:  No results for input(s): \"GLU\", \"BUN\", \"CREATSERUM\", \"GFRAA\", \"GFRNAA\", \"EGFRCR\", \"CA\", \"ALB\", \"NA\", \"K\", \"CL\", \"CO2\", \"ALKPHO\", \"AST\", \"ALT\", \"BILT\", \"TP\" in the last 168 hours.  No results for input(s): \"RBC\", \"HGB\", \"HCT\", \"MCV\", \"MCH\", \"MCHC\", \"RDW\", \"NEPRELIM\", \"WBC\", \"PLT\" in the last 168 hours.      No results for input(s): \"TROP\", \"TROPHS\", \"CK\" in the last 168 hours.  Lab Results   Component Value Date    PT 21.9 (H) 2013    PT 20.5 (H) 2013    PT 23.9 (H) 2013    INR 2.5 (A) 05/15/2024    INR 2.60 (H) 2024    INR 2.60 (H) 2024       Diagnostics:     ROS    Physical Exam:    Gen: Alert, oriented x 3, in no apparent distress  Heent: Pupils equal, reactive. Mucous membranes moist.   Cardiac: Regular rate and rhythm, normal S1,S2  Lungs: Clear to auscultation  Abd: Soft, non tender, non distended  Ext: No edema  Skin: Warm, dry  Neuro: No focal deficits  Right groin soft, no  hematoma. Dressing removed. Bruising noted      Medications:     atorvastatin  20 mg Oral Nightly    eplerenone  25 mg Oral Daily    metoprolol tartrate  50 mg Oral 2x Daily(Beta Blocker)    amiodarone  100 mg Oral Daily    pantoprazole  40 mg Oral QAM AC             Assessment:  Paroxysmal atrial fibrillation   S/p RFA ablation 5/15/24 with small groin ooze  On BB and amiodarone 100 mg po daily BB has been held due to bradycardia  On coumadin. INR 2.5 yesterday  Chronic HFrEF  On BB, eplerenone  CAD, CABG, PFO repair  On BB, statin   Hx of thoracic aortic aneurysm repair    Plan:  Continue BB, amiodarone, eplerenone, statin   Hold BB for HR < 60. Will reduce dose in half  Follow up Dr. Dee 6/6/24    Plan of care discussed with patient, RN.    LARISSA Loaiza  5/16/2024  10:37 AM

## 2024-05-16 NOTE — PLAN OF CARE
Alert and oriented x4 on tele monitor hr 50's sinus yash. Right groin dressing c/d/I. No bleeding and no hematoma. Pedal pulses present and palpable. Denies any pain. All needs attended and will continue to monitor. Call light within reach at all times.   Problem: CARDIOVASCULAR - ADULT  Goal: Maintains optimal cardiac output and hemodynamic stability  Description: INTERVENTIONS:  - Monitor vital signs, rhythm, and trends  - Monitor for bleeding, hypotension and signs of decreased cardiac output  - Evaluate effectiveness of vasoactive medications to optimize hemodynamic stability  - Monitor arterial and/or venous puncture sites for bleeding and/or hematoma  - Assess quality of pulses, skin color and temperature  - Assess for signs of decreased coronary artery perfusion - ex. Angina  - Evaluate fluid balance, assess for edema, trend weights  Outcome: Progressing  Goal: Absence of cardiac arrhythmias or at baseline  Description: INTERVENTIONS:  - Continuous cardiac monitoring, monitor vital signs, obtain 12 lead EKG if indicated  - Evaluate effectiveness of antiarrhythmic and heart rate control medications as ordered  - Initiate emergency measures for life threatening arrhythmias  - Monitor electrolytes and administer replacement therapy as ordered  Outcome: Progressing

## 2024-05-16 NOTE — PLAN OF CARE
Assumed care of patient at 0700  A/Ox4, RA, VSS  R groin with bruising but soft. Dressing C/D/I  Denies pain  Patient ambulating with staff, no oozing or bleeding from incisional site during ambulation. Once bandage was removed by APRN, scant bleeding noted. Treated with guaze and tape  Patient clear to discharge from cardiology's standpoint  Discharge instructions discussed at the bedside. All questions answered and patient verbalized understanding  Patient was discharged with all belongings by wheelchair to be taken home by spouse              Problem: Patient/Family Goals  Goal: Patient/Family Long Term Goal  Description: Patient's Long Term Goal: stay out of afib    Interventions:  - take meds as prescribed  -follow up appointments  - See additional Care Plan goals for specific interventions  Outcome: Adequate for Discharge  Goal: Patient/Family Short Term Goal  Description: Patient's Short Term Goal: no bleeding    Interventions:   - monitor for signs/symptoms of bleeding  -bed rest overnight  -1/2 dose coumadin tonight    - See additional Care Plan goals for specific interventions  Outcome: Adequate for Discharge     Problem: CARDIOVASCULAR - ADULT  Goal: Maintains optimal cardiac output and hemodynamic stability  Description: INTERVENTIONS:  - Monitor vital signs, rhythm, and trends  - Monitor for bleeding, hypotension and signs of decreased cardiac output  - Evaluate effectiveness of vasoactive medications to optimize hemodynamic stability  - Monitor arterial and/or venous puncture sites for bleeding and/or hematoma  - Assess quality of pulses, skin color and temperature  - Assess for signs of decreased coronary artery perfusion - ex. Angina  - Evaluate fluid balance, assess for edema, trend weights  Outcome: Adequate for Discharge  Goal: Absence of cardiac arrhythmias or at baseline  Description: INTERVENTIONS:  - Continuous cardiac monitoring, monitor vital signs, obtain 12 lead EKG if indicated  -  Evaluate effectiveness of antiarrhythmic and heart rate control medications as ordered  - Initiate emergency measures for life threatening arrhythmias  - Monitor electrolytes and administer replacement therapy as ordered  Outcome: Adequate for Discharge

## 2024-05-17 ENCOUNTER — PATIENT OUTREACH (OUTPATIENT)
Dept: CASE MANAGEMENT | Age: 79
End: 2024-05-17

## 2024-05-17 NOTE — PROGRESS NOTES
TCM chart review.  No TCM as patient follows with outside [facility] PCP.  Encounter closing.

## 2024-05-20 ENCOUNTER — HOSPITAL ENCOUNTER (OUTPATIENT)
Dept: LAB | Facility: HOSPITAL | Age: 79
Discharge: HOME | End: 2024-05-20
Attending: INTERNAL MEDICINE

## 2024-05-20 DIAGNOSIS — I48.0 PAROXYSMAL ATRIAL FIBRILLATION (HCC): ICD-10-CM

## 2024-05-20 LAB — INR BLDC: 1.9 (ref 0.9–1.1)

## 2024-05-20 PROCEDURE — 85610 PROTHROMBIN TIME: CPT

## 2024-05-28 ENCOUNTER — HOSPITAL ENCOUNTER (OUTPATIENT)
Dept: LAB | Facility: HOSPITAL | Age: 79
Discharge: HOME | End: 2024-05-28
Attending: INTERNAL MEDICINE

## 2024-05-28 DIAGNOSIS — Z51.81 ADMISSION FOR LONG-TERM (CURRENT) USE OF ANTICOAGULANTS: ICD-10-CM

## 2024-05-28 DIAGNOSIS — Z79.01 ADMISSION FOR LONG-TERM (CURRENT) USE OF ANTICOAGULANTS: ICD-10-CM

## 2024-05-28 DIAGNOSIS — I48.0 PAROXYSMAL ATRIAL FIBRILLATION (HCC): ICD-10-CM

## 2024-05-28 LAB — INR BLDC: 2.1 (ref 0.9–1.1)

## 2024-05-28 PROCEDURE — 85610 PROTHROMBIN TIME: CPT

## 2024-06-04 ENCOUNTER — HOSPITAL ENCOUNTER (OUTPATIENT)
Dept: LAB | Facility: HOSPITAL | Age: 79
Discharge: HOME | End: 2024-06-04
Attending: INTERNAL MEDICINE
Payer: MEDICARE

## 2024-06-04 DIAGNOSIS — Z79.01 ADMISSION FOR LONG-TERM (CURRENT) USE OF ANTICOAGULANTS: ICD-10-CM

## 2024-06-04 DIAGNOSIS — I48.0 PAROXYSMAL ATRIAL FIBRILLATION (HCC): ICD-10-CM

## 2024-06-04 DIAGNOSIS — Z51.81 ADMISSION FOR LONG-TERM (CURRENT) USE OF ANTICOAGULANTS: ICD-10-CM

## 2024-06-04 LAB — INR BLDC: 2.8 (ref 0.9–1.1)

## 2024-06-04 PROCEDURE — 85610 PROTHROMBIN TIME: CPT

## 2024-06-11 ENCOUNTER — HOSPITAL ENCOUNTER (OUTPATIENT)
Dept: LAB | Facility: HOSPITAL | Age: 79
Discharge: HOME | End: 2024-06-11
Attending: INTERNAL MEDICINE
Payer: MEDICARE

## 2024-06-11 DIAGNOSIS — Z51.81 ADMISSION FOR LONG-TERM (CURRENT) USE OF ANTICOAGULANTS: ICD-10-CM

## 2024-06-11 DIAGNOSIS — Z79.01 ADMISSION FOR LONG-TERM (CURRENT) USE OF ANTICOAGULANTS: ICD-10-CM

## 2024-06-11 DIAGNOSIS — I48.0 PAROXYSMAL ATRIAL FIBRILLATION (HCC): ICD-10-CM

## 2024-06-11 LAB — INR BLDC: 2.3 (ref 0.9–1.1)

## 2024-06-11 PROCEDURE — 85610 PROTHROMBIN TIME: CPT

## 2024-06-25 ENCOUNTER — HOSPITAL ENCOUNTER (OUTPATIENT)
Dept: LAB | Facility: HOSPITAL | Age: 79
Discharge: HOME | End: 2024-06-25
Attending: INTERNAL MEDICINE

## 2024-06-25 DIAGNOSIS — I48.0 PAROXYSMAL ATRIAL FIBRILLATION (HCC): ICD-10-CM

## 2024-06-25 DIAGNOSIS — Z51.81 ADMISSION FOR LONG-TERM (CURRENT) USE OF ANTICOAGULANTS: ICD-10-CM

## 2024-06-25 DIAGNOSIS — Z79.01 ADMISSION FOR LONG-TERM (CURRENT) USE OF ANTICOAGULANTS: ICD-10-CM

## 2024-06-25 LAB — INR BLDC: 2.7 (ref 0.9–1.1)

## 2024-06-25 PROCEDURE — 85610 PROTHROMBIN TIME: CPT

## 2024-07-09 ENCOUNTER — HOSPITAL ENCOUNTER (OUTPATIENT)
Dept: LAB | Facility: HOSPITAL | Age: 79
Discharge: HOME | End: 2024-07-09
Attending: INTERNAL MEDICINE
Payer: MEDICARE

## 2024-07-09 DIAGNOSIS — Z79.01 ADMISSION FOR LONG-TERM (CURRENT) USE OF ANTICOAGULANTS: ICD-10-CM

## 2024-07-09 DIAGNOSIS — I48.0 PAROXYSMAL ATRIAL FIBRILLATION (HCC): ICD-10-CM

## 2024-07-09 DIAGNOSIS — Z51.81 ADMISSION FOR LONG-TERM (CURRENT) USE OF ANTICOAGULANTS: ICD-10-CM

## 2024-07-09 LAB — INR BLDC: 2.2 (ref 0.9–1.1)

## 2024-07-09 PROCEDURE — 85610 PROTHROMBIN TIME: CPT

## 2024-07-23 ENCOUNTER — HOSPITAL ENCOUNTER (OUTPATIENT)
Dept: LAB | Facility: HOSPITAL | Age: 79
Discharge: HOME | End: 2024-07-23
Attending: INTERNAL MEDICINE
Payer: MEDICARE

## 2024-07-23 DIAGNOSIS — I48.0 PAROXYSMAL ATRIAL FIBRILLATION (HCC): ICD-10-CM

## 2024-07-23 DIAGNOSIS — Z51.81 ADMISSION FOR LONG-TERM (CURRENT) USE OF ANTICOAGULANTS: ICD-10-CM

## 2024-07-23 DIAGNOSIS — Z79.01 ADMISSION FOR LONG-TERM (CURRENT) USE OF ANTICOAGULANTS: ICD-10-CM

## 2024-07-23 LAB — INR BLDC: 2.6 (ref 0.9–1.1)

## 2024-07-23 PROCEDURE — 85610 PROTHROMBIN TIME: CPT

## 2024-08-18 NOTE — ED INITIAL ASSESSMENT (HPI)
Patient c/o epistaxis right nare on and off today. Patient sts normally minimal bleeding every morning. no

## 2024-08-19 ENCOUNTER — HOSPITAL ENCOUNTER (OUTPATIENT)
Dept: LAB | Facility: HOSPITAL | Age: 79
Discharge: HOME | End: 2024-08-19
Attending: INTERNAL MEDICINE
Payer: MEDICARE

## 2024-08-19 DIAGNOSIS — Z51.81 ADMISSION FOR LONG-TERM (CURRENT) USE OF ANTICOAGULANTS: ICD-10-CM

## 2024-08-19 DIAGNOSIS — I48.0 PAROXYSMAL ATRIAL FIBRILLATION (HCC): ICD-10-CM

## 2024-08-19 DIAGNOSIS — Z79.01 ADMISSION FOR LONG-TERM (CURRENT) USE OF ANTICOAGULANTS: ICD-10-CM

## 2024-08-19 LAB — INR BLDC: 2 (ref 0.9–1.1)

## 2024-08-19 PROCEDURE — 85610 PROTHROMBIN TIME: CPT

## 2024-08-26 ENCOUNTER — HOSPITAL ENCOUNTER (OUTPATIENT)
Dept: LAB | Facility: HOSPITAL | Age: 79
Discharge: HOME | End: 2024-08-26
Attending: INTERNAL MEDICINE
Payer: MEDICARE

## 2024-08-26 DIAGNOSIS — Z79.01 ADMISSION FOR LONG-TERM (CURRENT) USE OF ANTICOAGULANTS: ICD-10-CM

## 2024-08-26 DIAGNOSIS — I48.0 PAROXYSMAL ATRIAL FIBRILLATION (HCC): ICD-10-CM

## 2024-08-26 DIAGNOSIS — Z51.81 ADMISSION FOR LONG-TERM (CURRENT) USE OF ANTICOAGULANTS: ICD-10-CM

## 2024-08-26 LAB — INR BLDC: 1.8 (ref 0.9–1.1)

## 2024-08-26 PROCEDURE — 85610 PROTHROMBIN TIME: CPT

## 2024-08-30 ENCOUNTER — HOSPITAL ENCOUNTER (OUTPATIENT)
Dept: LAB | Facility: HOSPITAL | Age: 79
Discharge: HOME | End: 2024-08-30
Attending: INTERNAL MEDICINE
Payer: MEDICARE

## 2024-08-30 DIAGNOSIS — Z79.01 ADMISSION FOR LONG-TERM (CURRENT) USE OF ANTICOAGULANTS: ICD-10-CM

## 2024-08-30 DIAGNOSIS — Z51.81 ADMISSION FOR LONG-TERM (CURRENT) USE OF ANTICOAGULANTS: ICD-10-CM

## 2024-08-30 DIAGNOSIS — I48.0 PAROXYSMAL ATRIAL FIBRILLATION (HCC): ICD-10-CM

## 2024-08-30 LAB — INR BLDC: 1.3 (ref 0.9–1.1)

## 2024-08-30 PROCEDURE — 85610 PROTHROMBIN TIME: CPT

## 2024-09-03 ENCOUNTER — HOSPITAL ENCOUNTER (OUTPATIENT)
Dept: LAB | Facility: HOSPITAL | Age: 79
Discharge: HOME | End: 2024-09-03
Attending: INTERNAL MEDICINE
Payer: MEDICARE

## 2024-09-03 DIAGNOSIS — Z51.81 ADMISSION FOR LONG-TERM (CURRENT) USE OF ANTICOAGULANTS: ICD-10-CM

## 2024-09-03 DIAGNOSIS — Z79.01 ADMISSION FOR LONG-TERM (CURRENT) USE OF ANTICOAGULANTS: ICD-10-CM

## 2024-09-03 DIAGNOSIS — I48.0 PAROXYSMAL ATRIAL FIBRILLATION (HCC): ICD-10-CM

## 2024-09-03 LAB — INR BLDC: 1.7 (ref 0.9–1.1)

## 2024-09-03 PROCEDURE — 85610 PROTHROMBIN TIME: CPT

## 2024-09-10 ENCOUNTER — HOSPITAL ENCOUNTER (OUTPATIENT)
Dept: LAB | Facility: HOSPITAL | Age: 79
Discharge: HOME | End: 2024-09-10
Attending: INTERNAL MEDICINE
Payer: MEDICARE

## 2024-09-10 DIAGNOSIS — Z51.81 ADMISSION FOR LONG-TERM (CURRENT) USE OF ANTICOAGULANTS: ICD-10-CM

## 2024-09-10 DIAGNOSIS — I48.0 PAROXYSMAL ATRIAL FIBRILLATION (HCC): ICD-10-CM

## 2024-09-10 DIAGNOSIS — Z79.01 ADMISSION FOR LONG-TERM (CURRENT) USE OF ANTICOAGULANTS: ICD-10-CM

## 2024-09-10 LAB — INR BLDC: 2 (ref 0.9–1.1)

## 2024-09-10 PROCEDURE — 85610 PROTHROMBIN TIME: CPT

## 2024-09-16 ENCOUNTER — HOSPITAL ENCOUNTER (OUTPATIENT)
Dept: LAB | Facility: HOSPITAL | Age: 79
Discharge: HOME | End: 2024-09-16
Attending: INTERNAL MEDICINE
Payer: MEDICARE

## 2024-09-16 DIAGNOSIS — Z51.81 ADMISSION FOR LONG-TERM (CURRENT) USE OF ANTICOAGULANTS: ICD-10-CM

## 2024-09-16 DIAGNOSIS — I48.0 PAROXYSMAL ATRIAL FIBRILLATION (HCC): ICD-10-CM

## 2024-09-16 DIAGNOSIS — Z79.01 ADMISSION FOR LONG-TERM (CURRENT) USE OF ANTICOAGULANTS: ICD-10-CM

## 2024-09-16 LAB
INR BLDC: 2.4 (ref 0.9–1.1)
T4 FREE SERPL-MCNC: 1.6 NG/DL (ref 0.8–1.7)
TSI SER-ACNC: 2.92 MIU/ML (ref 0.55–4.78)

## 2024-09-16 PROCEDURE — 85610 PROTHROMBIN TIME: CPT

## 2024-09-16 PROCEDURE — 36415 COLL VENOUS BLD VENIPUNCTURE: CPT | Performed by: INTERNAL MEDICINE

## 2024-09-16 PROCEDURE — 84439 ASSAY OF FREE THYROXINE: CPT | Performed by: INTERNAL MEDICINE

## 2024-09-16 PROCEDURE — 84443 ASSAY THYROID STIM HORMONE: CPT | Performed by: INTERNAL MEDICINE

## 2024-09-24 ENCOUNTER — HOSPITAL ENCOUNTER (OUTPATIENT)
Dept: LAB | Facility: HOSPITAL | Age: 79
Discharge: HOME | End: 2024-09-24
Attending: INTERNAL MEDICINE
Payer: MEDICARE

## 2024-09-24 DIAGNOSIS — Z79.01 ADMISSION FOR LONG-TERM (CURRENT) USE OF ANTICOAGULANTS: ICD-10-CM

## 2024-09-24 DIAGNOSIS — Z51.81 ADMISSION FOR LONG-TERM (CURRENT) USE OF ANTICOAGULANTS: ICD-10-CM

## 2024-09-24 DIAGNOSIS — I48.0 PAROXYSMAL ATRIAL FIBRILLATION (HCC): ICD-10-CM

## 2024-09-24 LAB — INR BLDC: 2.5 (ref 0.9–1.1)

## 2024-09-24 PROCEDURE — 85610 PROTHROMBIN TIME: CPT

## 2024-10-22 ENCOUNTER — HOSPITAL ENCOUNTER (OUTPATIENT)
Dept: LAB | Facility: HOSPITAL | Age: 79
Discharge: HOME | End: 2024-10-22
Attending: INTERNAL MEDICINE
Payer: MEDICARE

## 2024-10-22 DIAGNOSIS — Z79.01 ADMISSION FOR LONG-TERM (CURRENT) USE OF ANTICOAGULANTS: ICD-10-CM

## 2024-10-22 DIAGNOSIS — Z51.81 ADMISSION FOR LONG-TERM (CURRENT) USE OF ANTICOAGULANTS: ICD-10-CM

## 2024-10-22 DIAGNOSIS — I48.0 PAROXYSMAL ATRIAL FIBRILLATION (HCC): ICD-10-CM

## 2024-10-22 LAB — INR BLDC: 3.2 (ref 0.9–1.1)

## 2024-10-22 PROCEDURE — 85610 PROTHROMBIN TIME: CPT

## 2024-10-29 ENCOUNTER — HOSPITAL ENCOUNTER (OUTPATIENT)
Dept: LAB | Facility: HOSPITAL | Age: 79
Discharge: HOME | End: 2024-10-29
Attending: INTERNAL MEDICINE
Payer: MEDICARE

## 2024-10-29 DIAGNOSIS — Z79.01 ADMISSION FOR LONG-TERM (CURRENT) USE OF ANTICOAGULANTS: ICD-10-CM

## 2024-10-29 DIAGNOSIS — Z51.81 ADMISSION FOR LONG-TERM (CURRENT) USE OF ANTICOAGULANTS: ICD-10-CM

## 2024-10-29 DIAGNOSIS — I48.0 PAROXYSMAL ATRIAL FIBRILLATION (HCC): ICD-10-CM

## 2024-10-29 LAB — INR BLDC: 2.7 (ref 0.9–1.1)

## 2024-10-29 PROCEDURE — 85610 PROTHROMBIN TIME: CPT

## 2024-11-04 ENCOUNTER — HOSPITAL ENCOUNTER (OUTPATIENT)
Dept: LAB | Facility: HOSPITAL | Age: 79
Discharge: HOME | End: 2024-11-04
Attending: INTERNAL MEDICINE
Payer: MEDICARE

## 2024-11-04 DIAGNOSIS — Z79.01 ADMISSION FOR LONG-TERM (CURRENT) USE OF ANTICOAGULANTS: ICD-10-CM

## 2024-11-04 DIAGNOSIS — I48.0 PAROXYSMAL ATRIAL FIBRILLATION (HCC): ICD-10-CM

## 2024-11-04 DIAGNOSIS — Z51.81 ADMISSION FOR LONG-TERM (CURRENT) USE OF ANTICOAGULANTS: ICD-10-CM

## 2024-11-04 LAB — INR BLDC: 2.6 (ref 0.9–1.1)

## 2024-11-04 PROCEDURE — 85610 PROTHROMBIN TIME: CPT

## 2024-11-19 ENCOUNTER — HOSPITAL ENCOUNTER (OUTPATIENT)
Dept: LAB | Facility: HOSPITAL | Age: 79
Discharge: HOME | End: 2024-11-19
Attending: INTERNAL MEDICINE
Payer: MEDICARE

## 2024-11-19 DIAGNOSIS — I48.0 PAROXYSMAL ATRIAL FIBRILLATION (HCC): ICD-10-CM

## 2024-11-19 DIAGNOSIS — Z79.01 ADMISSION FOR LONG-TERM (CURRENT) USE OF ANTICOAGULANTS: ICD-10-CM

## 2024-11-19 DIAGNOSIS — Z51.81 ADMISSION FOR LONG-TERM (CURRENT) USE OF ANTICOAGULANTS: ICD-10-CM

## 2024-11-19 LAB — INR BLDC: 2.4 (ref 0.9–1.1)

## 2024-11-19 PROCEDURE — 85610 PROTHROMBIN TIME: CPT

## 2024-12-04 ENCOUNTER — HOSPITAL ENCOUNTER (OUTPATIENT)
Dept: BONE DENSITY | Age: 79
Discharge: HOME | End: 2024-12-04
Attending: INTERNAL MEDICINE
Payer: MEDICARE

## 2024-12-04 ENCOUNTER — HOSPITAL ENCOUNTER (OUTPATIENT)
Dept: MAMMOGRAPHY | Facility: HOSPITAL | Age: 79
Discharge: HOME | End: 2024-12-04
Attending: INTERNAL MEDICINE
Payer: MEDICARE

## 2024-12-04 DIAGNOSIS — Z78.0 POSTMENOPAUSAL: ICD-10-CM

## 2024-12-04 DIAGNOSIS — Z12.31 ENCOUNTER FOR MAMMOGRAM TO ESTABLISH BASELINE MAMMOGRAM: ICD-10-CM

## 2024-12-04 PROCEDURE — 77063 BREAST TOMOSYNTHESIS BI: CPT | Performed by: INTERNAL MEDICINE

## 2024-12-04 PROCEDURE — 77080 DXA BONE DENSITY AXIAL: CPT | Performed by: INTERNAL MEDICINE

## 2024-12-04 PROCEDURE — 77067 SCR MAMMO BI INCL CAD: CPT | Performed by: INTERNAL MEDICINE

## 2024-12-05 ENCOUNTER — TELEPHONE (OUTPATIENT)
Dept: HEMATOLOGY/ONCOLOGY | Facility: HOSPITAL | Age: 79
End: 2024-12-05

## 2024-12-05 NOTE — TELEPHONE ENCOUNTER
Jayshree Emery, KOSTA  P Edw Nila Brown Rns  Bone density is slightly lower than previous, but similar to 2020. Please confirm that she's been taking her calcium and vitamin D supplement (on med list). Otherwise, would recommend weight bearing activity like walking or irma chi    Reviewed the above.    verifies she is taking the calcium and vit d, but is not walking like she used to, due to a compression fracture in her spine that happened in Feb.   Questions addressed regarding mammogram results as well.   Will call him back if there are other recommendations.

## 2024-12-17 ENCOUNTER — HOSPITAL ENCOUNTER (OUTPATIENT)
Dept: LAB | Facility: HOSPITAL | Age: 79
Discharge: HOME | End: 2024-12-17
Attending: INTERNAL MEDICINE
Payer: MEDICARE

## 2024-12-17 DIAGNOSIS — I48.0 PAROXYSMAL ATRIAL FIBRILLATION (HCC): ICD-10-CM

## 2024-12-17 DIAGNOSIS — Z79.01 ADMISSION FOR LONG-TERM (CURRENT) USE OF ANTICOAGULANTS: ICD-10-CM

## 2024-12-17 DIAGNOSIS — Z51.81 ADMISSION FOR LONG-TERM (CURRENT) USE OF ANTICOAGULANTS: ICD-10-CM

## 2024-12-17 LAB — INR BLDC: 2.3 (ref 0.9–1.1)

## 2024-12-17 PROCEDURE — 85610 PROTHROMBIN TIME: CPT

## 2024-12-23 ENCOUNTER — HOSPITAL ENCOUNTER (OUTPATIENT)
Dept: LAB | Facility: HOSPITAL | Age: 79
Discharge: HOME | End: 2024-12-23
Attending: INTERNAL MEDICINE
Payer: MEDICARE

## 2024-12-23 DIAGNOSIS — I48.0 PAROXYSMAL ATRIAL FIBRILLATION (HCC): ICD-10-CM

## 2024-12-23 DIAGNOSIS — Z79.01 ADMISSION FOR LONG-TERM (CURRENT) USE OF ANTICOAGULANTS: ICD-10-CM

## 2024-12-23 DIAGNOSIS — Z51.81 ADMISSION FOR LONG-TERM (CURRENT) USE OF ANTICOAGULANTS: ICD-10-CM

## 2024-12-23 LAB — INR BLDC: 2.9 (ref 0.9–1.1)

## 2024-12-23 PROCEDURE — 85610 PROTHROMBIN TIME: CPT

## 2024-12-30 ENCOUNTER — HOSPITAL ENCOUNTER (OUTPATIENT)
Dept: LAB | Facility: HOSPITAL | Age: 79
Discharge: HOME | End: 2024-12-30
Attending: INTERNAL MEDICINE
Payer: MEDICARE

## 2024-12-30 DIAGNOSIS — Z79.01 ADMISSION FOR LONG-TERM (CURRENT) USE OF ANTICOAGULANTS: ICD-10-CM

## 2024-12-30 DIAGNOSIS — Z51.81 ADMISSION FOR LONG-TERM (CURRENT) USE OF ANTICOAGULANTS: ICD-10-CM

## 2024-12-30 DIAGNOSIS — I48.0 PAROXYSMAL ATRIAL FIBRILLATION (HCC): ICD-10-CM

## 2024-12-30 LAB — INR BLDC: 3.3 (ref 0.9–1.1)

## 2024-12-30 PROCEDURE — 85610 PROTHROMBIN TIME: CPT

## 2025-01-06 ENCOUNTER — HOSPITAL ENCOUNTER (OUTPATIENT)
Dept: LAB | Facility: HOSPITAL | Age: 80
Discharge: HOME | End: 2025-01-06
Attending: INTERNAL MEDICINE
Payer: MEDICARE

## 2025-01-06 DIAGNOSIS — Z79.01 ADMISSION FOR LONG-TERM (CURRENT) USE OF ANTICOAGULANTS: ICD-10-CM

## 2025-01-06 DIAGNOSIS — I48.0 PAROXYSMAL ATRIAL FIBRILLATION (HCC): ICD-10-CM

## 2025-01-06 DIAGNOSIS — Z51.81 ADMISSION FOR LONG-TERM (CURRENT) USE OF ANTICOAGULANTS: ICD-10-CM

## 2025-01-06 LAB — INR BLDC: 2.8 (ref 0.9–1.1)

## 2025-01-06 PROCEDURE — 85610 PROTHROMBIN TIME: CPT

## 2025-01-07 ENCOUNTER — APPOINTMENT (OUTPATIENT)
Dept: HEMATOLOGY/ONCOLOGY | Facility: HOSPITAL | Age: 80
End: 2025-01-07
Attending: INTERNAL MEDICINE
Payer: MEDICARE

## 2025-01-08 ENCOUNTER — HOSPITAL ENCOUNTER (OUTPATIENT)
Dept: INTERVENTIONAL RADIOLOGY/VASCULAR | Facility: HOSPITAL | Age: 80
Discharge: HOME | End: 2025-01-08
Attending: NURSE PRACTITIONER
Payer: MEDICARE

## 2025-01-13 ENCOUNTER — HOSPITAL ENCOUNTER (OUTPATIENT)
Dept: LAB | Facility: HOSPITAL | Age: 80
Discharge: HOME | End: 2025-01-13
Attending: INTERNAL MEDICINE
Payer: MEDICARE

## 2025-01-13 DIAGNOSIS — Z79.01 ADMISSION FOR LONG-TERM (CURRENT) USE OF ANTICOAGULANTS: ICD-10-CM

## 2025-01-13 DIAGNOSIS — Z51.81 ADMISSION FOR LONG-TERM (CURRENT) USE OF ANTICOAGULANTS: ICD-10-CM

## 2025-01-13 DIAGNOSIS — I48.0 PAROXYSMAL ATRIAL FIBRILLATION (HCC): ICD-10-CM

## 2025-01-13 LAB — INR BLDC: 2.7 (ref 0.9–1.1)

## 2025-01-13 PROCEDURE — 85610 PROTHROMBIN TIME: CPT

## 2025-01-14 NOTE — PROGRESS NOTES
Cancer Center Progress Note      Patient Name:  Angeles Anand  YOB: 1945  Medical Record Number:  [id number]    Date of visit:1/15/2025    CHIEF COMPLAINT: Stage IIA L breast ca, s/p lumpectomy, RT.    HPI:  79 year old female that I have followed since 3/13. S/p L lumpectomy-1.4 cm grade 2 IDC with 1+LN. Tumor  ER/OR+, HER2 negative. She declined Oncotype DX or chemotherapy and started anastrozole in 3/13. She comes for a followup visit today.  Anastrozole finally stopped 2/24 after she had a T8 compression fracture.  ROS:     CONSTITUTIONAL: no fever,chills fatigue,night sweats or weight loss  NEUROLOGIC: no headache, seizures, diplopia or weakness  RESPIRATORY: no cough, SOB or hemoptysis  CVS: no chest pain, ankle swelling, DALLAS  GI: no nausea, vomiting, diarrhea or BRBPR  MUSCULOSKELETAL: Back pain.  DERM: no alopecia, rash, pruritis  : no hematuria, dysuria or frequency  PSYCH: no confusion, depression or panic  HEME: no easy bruising or bleeding     ALLERGIES:    Allergies   Allergen Reactions    Radiology Contrast Iodinated Dyes HIVES and RASH    Gadolinium     Kiwi Extract SWELLING       MEDICATIONS:    Current Outpatient Medications   Medication Sig Dispense Refill    metoprolol tartrate 25 MG Oral Tab Take 1 tablet (25 mg total) by mouth 2 (two) times daily.      amiodarone 200 MG Oral Tab Take 1/2 tablet daily (100mg daily) 40 tablet 3    amiodarone 100 MG Oral Tab Take 1 tablet (100 mg total) by mouth daily.      atorvastatin 20 MG Oral Tab Take 1 tablet (20 mg total) by mouth nightly.      cyanocobalamin 500 MCG Oral Tab Take 1 tablet (500 mcg total) by mouth every other day.      Melatonin 5 MG Oral Tab Take 1 tablet (5 mg total) by mouth nightly as needed (sleep).      acetaminophen 325 MG Oral Tab Take 1 tablet (325 mg total) by mouth every 6 (six) hours as needed for Pain.      eplerenone 25 MG Oral Tab Take 1 tablet (25 mg total) by mouth daily. 30 tablet 0    warfarin 2.5 MG Oral  Tab Take 1 tablet (2.5 mg total) by mouth As Directed. Take as directed by the warfarin clinic Nightly  Monday, Tuesday, Wednesday, Thursday, Saturday      Calcium-Vitamin D (CALTRATE 600 PLUS-VIT D OR) Take 600 mg by mouth 2 (two) times daily.           VITALS:  Height: 159 cm (5' 2.6\") (01/15 1036)  Weight: 53.5 kg (118 lb) (01/15 1036)  BSA (Calculated - sq m): 1.54 sq meters (01/15 1036)  Pulse: 60 (01/15 1036)  BP: 111/63 (01/15 1036)  Temp: 97.8 °F (36.6 °C) (01/15 1036)  Do Not Use - Resp Rate: --  SpO2: 95 % (01/15 1036)      PS:  ECO    PHYSICAL EXAM:    General: alert and oriented x 3, not in acute distress. Accompanied by .  In wheelchair.  HEENT: SRINIVASA, oropharynx  clear.  Neck: supple.  No JVD /adenopathy.  CVS: S1S2, regular  Rhythm, no murmurs.   Lungs: Clear to auscultation and percussion.  Abdomen: Soft, non tender, no hepato-splenomegaly.  Extremities:  No edema.  CNS: no focal deficit  Breast: Unable to examine as in wheelchair and severe back pain.    Emotional well being: Patient's emotional well being was assessed.  No issues requiring acute psychosocial intervention were identified.     ASSESSMENT AND PLAN:     # Stage II L breast ca, s/p lumpectomy (T1N1MX) s/p L breast lumpectomy 3/13 - 1.4 cm  grade 2 IDC, 1+LN with 0.4 cm  metastasis, no extracapsular extension. Tumor  ER/MS+, HER2 negative.  Received anastrozole from 3/13-.  She wanted to continue but had T8 compression fracture.    Mammogram  okay, next due .  # Osteopenia: DEXA  showed osteopenia with slightly worsening T-score of -1.6 compared to -1.3 in .  Continue calcium/vitamin D and repeat DEXA .  She was on Fosamax which was discontinued due to dental issues.  They will discuss alternate medication with endocrinology.  Survivorship issues were addressed with the patient.  No issues were identified by the patient.  They wish to continue annual follow-up.  ORDERS PLACED:        Procedures     [facility] DARCI 2D+3D SCREENING BILAT (CPT=77067/81117)       Return in about 1 year (around 1/15/2026) for MD visit.    Ava Brown M.D.    [facility]    [facility] [place]  [address] [place] [address] [place], [address]      1/15/2025

## 2025-01-15 ENCOUNTER — OFFICE VISIT (OUTPATIENT)
Age: 80
End: 2025-01-15
Attending: INTERNAL MEDICINE
Payer: MEDICARE

## 2025-01-15 VITALS
HEART RATE: 60 BPM | SYSTOLIC BLOOD PRESSURE: 111 MMHG | OXYGEN SATURATION: 95 % | HEIGHT: 62.6 IN | DIASTOLIC BLOOD PRESSURE: 63 MMHG | WEIGHT: 118 LBS | RESPIRATION RATE: 16 BRPM | TEMPERATURE: 98 F | BODY MASS INDEX: 21.17 KG/M2

## 2025-01-15 DIAGNOSIS — T50.905D ADVERSE EFFECT OF DRUG, SUBSEQUENT ENCOUNTER: ICD-10-CM

## 2025-01-15 DIAGNOSIS — C50.919 MALIGNANT NEOPLASM OF BREAST IN FEMALE, ESTROGEN RECEPTOR POSITIVE, UNSPECIFIED LATERALITY, UNSPECIFIED SITE OF BREAST (HCC): Primary | ICD-10-CM

## 2025-01-15 DIAGNOSIS — Z12.31 ENCOUNTER FOR MAMMOGRAM TO ESTABLISH BASELINE MAMMOGRAM: ICD-10-CM

## 2025-01-15 DIAGNOSIS — Z17.0 MALIGNANT NEOPLASM OF BREAST IN FEMALE, ESTROGEN RECEPTOR POSITIVE, UNSPECIFIED LATERALITY, UNSPECIFIED SITE OF BREAST (HCC): Primary | ICD-10-CM

## 2025-01-15 RX ORDER — METOPROLOL TARTRATE 25 MG/1
25 TABLET, FILM COATED ORAL 2 TIMES DAILY
COMMUNITY
Start: 2024-12-19

## 2025-01-15 NOTE — PROGRESS NOTES
Education Record    Learner:  Patient/ spouse    Disease / Diagnosis:left breast cancer   Annual FU approx   Barriers / Limitations:  None   Comments:    Method:  Discussion   Comments:    General Topics:  Plan of care reviewed   Comments:    Outcome:  Shows understanding   Comments:   Breast imaging up to date.   Completed AI 2/2024  Pt still with minor pain from compression fracture.   Since last seen, had cardiac ablation, that failed,  Questions about resuming fosamax, PCP want her to, but pt had teeth pulled, and oral surgeon with concerns about resuming.

## 2025-01-27 ENCOUNTER — HOSPITAL ENCOUNTER (OUTPATIENT)
Dept: LAB | Facility: HOSPITAL | Age: 80
Discharge: HOME | End: 2025-01-27
Attending: INTERNAL MEDICINE
Payer: MEDICARE

## 2025-01-27 DIAGNOSIS — Z51.81 ADMISSION FOR LONG-TERM (CURRENT) USE OF ANTICOAGULANTS: ICD-10-CM

## 2025-01-27 DIAGNOSIS — Z79.01 ADMISSION FOR LONG-TERM (CURRENT) USE OF ANTICOAGULANTS: ICD-10-CM

## 2025-01-27 DIAGNOSIS — I48.0 PAROXYSMAL ATRIAL FIBRILLATION (HCC): ICD-10-CM

## 2025-01-27 LAB — INR BLDC: 2.8 (ref 0.9–1.1)

## 2025-01-27 PROCEDURE — 85610 PROTHROMBIN TIME: CPT

## 2025-02-04 ENCOUNTER — TELEPHONE (OUTPATIENT)
Dept: HEMATOLOGY/ONCOLOGY | Facility: HOSPITAL | Age: 80
End: 2025-02-04

## 2025-02-04 NOTE — TELEPHONE ENCOUNTER
Returned the call.   Let him know I reviewed with Dr Brown and there is no complication to proceed with pacemaker on the left side.   No increased risk of lymphadema.

## 2025-02-04 NOTE — TELEPHONE ENCOUNTER
medardo calling asking a question for the doctor,. Her cardiologist want to place a pace maker in her left side but he has concerns because Angeles had breast surgery 11 years ago. Please give medardo a call back . Thank you paul

## 2025-02-07 ENCOUNTER — HOSPITAL ENCOUNTER (OUTPATIENT)
Dept: LAB | Facility: HOSPITAL | Age: 80
Discharge: HOME | End: 2025-02-07
Attending: INTERNAL MEDICINE
Payer: MEDICARE

## 2025-02-07 DIAGNOSIS — I48.0 PAROXYSMAL ATRIAL FIBRILLATION (HCC): ICD-10-CM

## 2025-02-07 DIAGNOSIS — Z79.01 ADMISSION FOR LONG-TERM (CURRENT) USE OF ANTICOAGULANTS: ICD-10-CM

## 2025-02-07 DIAGNOSIS — Z51.81 ADMISSION FOR LONG-TERM (CURRENT) USE OF ANTICOAGULANTS: ICD-10-CM

## 2025-02-07 LAB — INR BLDC: 3.1 (ref 0.9–1.1)

## 2025-02-07 PROCEDURE — 85610 PROTHROMBIN TIME: CPT

## 2025-02-14 ENCOUNTER — HOSPITAL ENCOUNTER (OUTPATIENT)
Dept: LAB | Facility: HOSPITAL | Age: 80
Discharge: HOME | End: 2025-02-14
Attending: INTERNAL MEDICINE
Payer: MEDICARE

## 2025-02-14 DIAGNOSIS — I48.0 PAROXYSMAL ATRIAL FIBRILLATION (HCC): ICD-10-CM

## 2025-02-14 DIAGNOSIS — Z79.01 ADMISSION FOR LONG-TERM (CURRENT) USE OF ANTICOAGULANTS: ICD-10-CM

## 2025-02-14 DIAGNOSIS — Z51.81 ADMISSION FOR LONG-TERM (CURRENT) USE OF ANTICOAGULANTS: ICD-10-CM

## 2025-02-14 LAB — INR BLDC: 3.2 (ref 0.9–1.1)

## 2025-02-14 PROCEDURE — 85610 PROTHROMBIN TIME: CPT

## 2025-02-18 ENCOUNTER — TELEPHONE (OUTPATIENT)
Age: 80
End: 2025-02-18

## 2025-02-18 NOTE — TELEPHONE ENCOUNTER
Pt spouse Deep is calling due to patient had visit over 30 days ago and the billing has not been submitted to insurance. He contacted billing and they informed him to contact Dr. Brown. He is requesting a call back from Nurse or  To straighten the matter out.       Please contact Deep.

## 2025-02-21 ENCOUNTER — HOSPITAL ENCOUNTER (OUTPATIENT)
Dept: LAB | Facility: HOSPITAL | Age: 80
Discharge: HOME | End: 2025-02-21
Attending: INTERNAL MEDICINE
Payer: MEDICARE

## 2025-02-21 DIAGNOSIS — Z51.81 ADMISSION FOR LONG-TERM (CURRENT) USE OF ANTICOAGULANTS: ICD-10-CM

## 2025-02-21 DIAGNOSIS — Z79.01 ADMISSION FOR LONG-TERM (CURRENT) USE OF ANTICOAGULANTS: ICD-10-CM

## 2025-02-21 DIAGNOSIS — I48.0 PAROXYSMAL ATRIAL FIBRILLATION (HCC): ICD-10-CM

## 2025-02-21 LAB — INR BLDC: 3.4 (ref 0.9–1.1)

## 2025-02-21 PROCEDURE — 85610 PROTHROMBIN TIME: CPT

## 2025-02-28 ENCOUNTER — HOSPITAL ENCOUNTER (OUTPATIENT)
Dept: LAB | Facility: HOSPITAL | Age: 80
Discharge: HOME | End: 2025-02-28
Attending: INTERNAL MEDICINE
Payer: MEDICARE

## 2025-02-28 DIAGNOSIS — Z79.01 ADMISSION FOR LONG-TERM (CURRENT) USE OF ANTICOAGULANTS: ICD-10-CM

## 2025-02-28 DIAGNOSIS — I48.0 PAROXYSMAL ATRIAL FIBRILLATION (HCC): ICD-10-CM

## 2025-02-28 DIAGNOSIS — Z51.81 ADMISSION FOR LONG-TERM (CURRENT) USE OF ANTICOAGULANTS: ICD-10-CM

## 2025-02-28 LAB — INR BLDC: 2.6 (ref 0.9–1.1)

## 2025-02-28 PROCEDURE — 85610 PROTHROMBIN TIME: CPT

## 2025-03-05 ENCOUNTER — HOSPITAL ENCOUNTER (OUTPATIENT)
Dept: LAB | Facility: HOSPITAL | Age: 80
Discharge: HOME | End: 2025-03-05
Attending: INTERNAL MEDICINE
Payer: MEDICARE

## 2025-03-05 DIAGNOSIS — I48.0 PAROXYSMAL ATRIAL FIBRILLATION (HCC): ICD-10-CM

## 2025-03-05 DIAGNOSIS — Z51.81 ADMISSION FOR LONG-TERM (CURRENT) USE OF ANTICOAGULANTS: ICD-10-CM

## 2025-03-05 DIAGNOSIS — Z79.01 ADMISSION FOR LONG-TERM (CURRENT) USE OF ANTICOAGULANTS: ICD-10-CM

## 2025-03-05 LAB
INR BLDC: 2.6 (ref 0.9–1.1)
INR BLDC: 2.7 (ref 0.9–1.1)

## 2025-03-05 PROCEDURE — 85610 PROTHROMBIN TIME: CPT

## 2025-03-09 ENCOUNTER — APPOINTMENT (OUTPATIENT)
Dept: GENERAL RADIOLOGY | Facility: HOSPITAL | Age: 80
End: 2025-03-09
Attending: EMERGENCY MEDICINE
Payer: MEDICARE

## 2025-03-09 ENCOUNTER — HOSPITAL ENCOUNTER (INPATIENT)
Facility: HOSPITAL | Age: 80
LOS: 1 days | Discharge: HOME | End: 2025-03-11
Attending: EMERGENCY MEDICINE | Admitting: HOSPITALIST
Payer: MEDICARE

## 2025-03-09 DIAGNOSIS — R06.02 SOB (SHORTNESS OF BREATH) ON EXERTION: Primary | ICD-10-CM

## 2025-03-09 LAB
ALBUMIN SERPL-MCNC: 4.1 G/DL (ref 3.2–4.8)
ALBUMIN/GLOB SERPL: 1.4 {RATIO} (ref 1–2)
ALP LIVER SERPL-CCNC: 99 U/L
ALT SERPL-CCNC: 43 U/L
ANION GAP SERPL CALC-SCNC: 12 MMOL/L (ref 0–18)
AST SERPL-CCNC: 46 U/L (ref ?–34)
BASOPHILS # BLD AUTO: 0.01 X10(3) UL (ref 0–0.2)
BASOPHILS NFR BLD AUTO: 0.2 %
BILIRUB SERPL-MCNC: 1 MG/DL (ref 0.2–1.1)
BUN BLD-MCNC: 23 MG/DL (ref 9–23)
CALCIUM BLD-MCNC: 8.9 MG/DL (ref 8.7–10.6)
CHLORIDE SERPL-SCNC: 108 MMOL/L (ref 98–112)
CO2 SERPL-SCNC: 23 MMOL/L (ref 21–32)
CREAT BLD-MCNC: 0.97 MG/DL
EGFRCR SERPLBLD CKD-EPI 2021: 59 ML/MIN/1.73M2 (ref 60–?)
EOSINOPHIL # BLD AUTO: 0.01 X10(3) UL (ref 0–0.7)
EOSINOPHIL NFR BLD AUTO: 0.2 %
ERYTHROCYTE [DISTWIDTH] IN BLOOD BY AUTOMATED COUNT: 15 %
FLUAV + FLUBV RNA SPEC NAA+PROBE: NEGATIVE
FLUAV + FLUBV RNA SPEC NAA+PROBE: NEGATIVE
GLOBULIN PLAS-MCNC: 3 G/DL (ref 2–3.5)
GLUCOSE BLD-MCNC: 106 MG/DL (ref 70–99)
HCT VFR BLD AUTO: 35.8 %
HGB BLD-MCNC: 11.3 G/DL
IMM GRANULOCYTES # BLD AUTO: 0.01 X10(3) UL (ref 0–1)
IMM GRANULOCYTES NFR BLD: 0.2 %
LYMPHOCYTES # BLD AUTO: 2.03 X10(3) UL (ref 1–4)
LYMPHOCYTES NFR BLD AUTO: 41.7 %
MCH RBC QN AUTO: 26.2 PG (ref 26–34)
MCHC RBC AUTO-ENTMCNC: 31.6 G/DL (ref 31–37)
MCV RBC AUTO: 83.1 FL
MONOCYTES # BLD AUTO: 1.08 X10(3) UL (ref 0.1–1)
MONOCYTES NFR BLD AUTO: 22.2 %
NEUTROPHILS # BLD AUTO: 1.73 X10 (3) UL (ref 1.5–7.7)
NEUTROPHILS # BLD AUTO: 1.73 X10(3) UL (ref 1.5–7.7)
NEUTROPHILS NFR BLD AUTO: 35.5 %
NT-PROBNP SERPL-MCNC: ABNORMAL PG/ML (ref ?–450)
OSMOLALITY SERPL CALC.SUM OF ELEC: 300 MOSM/KG (ref 275–295)
PLATELET # BLD AUTO: 147 10(3)UL (ref 150–450)
POTASSIUM SERPL-SCNC: 3.8 MMOL/L (ref 3.5–5.1)
PROT SERPL-MCNC: 7.1 G/DL (ref 5.7–8.2)
RBC # BLD AUTO: 4.31 X10(6)UL
RSV RNA SPEC NAA+PROBE: NEGATIVE
SARS-COV-2 RNA RESP QL NAA+PROBE: NOT DETECTED
SODIUM SERPL-SCNC: 143 MMOL/L (ref 136–145)
TROPONIN I SERPL HS-MCNC: 17 NG/L
WBC # BLD AUTO: 4.9 X10(3) UL (ref 4–11)

## 2025-03-09 PROCEDURE — 83880 ASSAY OF NATRIURETIC PEPTIDE: CPT | Performed by: EMERGENCY MEDICINE

## 2025-03-09 PROCEDURE — 96374 THER/PROPH/DIAG INJ IV PUSH: CPT

## 2025-03-09 PROCEDURE — 85610 PROTHROMBIN TIME: CPT | Performed by: HOSPITALIST

## 2025-03-09 PROCEDURE — 99285 EMERGENCY DEPT VISIT HI MDM: CPT

## 2025-03-09 PROCEDURE — 80053 COMPREHEN METABOLIC PANEL: CPT | Performed by: EMERGENCY MEDICINE

## 2025-03-09 PROCEDURE — 84484 ASSAY OF TROPONIN QUANT: CPT | Performed by: EMERGENCY MEDICINE

## 2025-03-09 PROCEDURE — 93005 ELECTROCARDIOGRAM TRACING: CPT

## 2025-03-09 PROCEDURE — 93010 ELECTROCARDIOGRAM REPORT: CPT

## 2025-03-09 PROCEDURE — 0241U SARS-COV-2/FLU A AND B/RSV BY PCR (GENEXPERT): CPT | Performed by: EMERGENCY MEDICINE

## 2025-03-09 PROCEDURE — 85025 COMPLETE CBC W/AUTO DIFF WBC: CPT | Performed by: EMERGENCY MEDICINE

## 2025-03-09 PROCEDURE — 71045 X-RAY EXAM CHEST 1 VIEW: CPT | Performed by: EMERGENCY MEDICINE

## 2025-03-09 RX ORDER — FUROSEMIDE 10 MG/ML
40 INJECTION INTRAMUSCULAR; INTRAVENOUS ONCE
Status: COMPLETED | OUTPATIENT
Start: 2025-03-09 | End: 2025-03-09

## 2025-03-10 ENCOUNTER — APPOINTMENT (OUTPATIENT)
Dept: CV DIAGNOSTICS | Facility: HOSPITAL | Age: 80
End: 2025-03-10
Attending: INTERNAL MEDICINE
Payer: MEDICARE

## 2025-03-10 ENCOUNTER — APPOINTMENT (OUTPATIENT)
Dept: CV DIAGNOSTICS | Facility: HOSPITAL | Age: 80
End: 2025-03-10
Attending: NURSE PRACTITIONER
Payer: MEDICARE

## 2025-03-10 LAB
ALBUMIN SERPL-MCNC: 4.2 G/DL (ref 3.2–4.8)
ALBUMIN/GLOB SERPL: 1.6 {RATIO} (ref 1–2)
ALP LIVER SERPL-CCNC: 96 U/L
ALT SERPL-CCNC: 42 U/L
ANION GAP SERPL CALC-SCNC: 12 MMOL/L (ref 0–18)
ANION GAP SERPL CALC-SCNC: 9 MMOL/L (ref 0–18)
AST SERPL-CCNC: 40 U/L (ref ?–34)
ATRIAL RATE: 68 BPM
BASOPHILS # BLD AUTO: 0.02 X10(3) UL (ref 0–0.2)
BASOPHILS NFR BLD AUTO: 0.4 %
BILIRUB SERPL-MCNC: 1.4 MG/DL (ref 0.2–1.1)
BUN BLD-MCNC: 18 MG/DL (ref 9–23)
BUN BLD-MCNC: 19 MG/DL (ref 9–23)
CALCIUM BLD-MCNC: 9.4 MG/DL (ref 8.7–10.6)
CALCIUM BLD-MCNC: 9.4 MG/DL (ref 8.7–10.6)
CHLORIDE SERPL-SCNC: 104 MMOL/L (ref 98–112)
CHLORIDE SERPL-SCNC: 106 MMOL/L (ref 98–112)
CO2 SERPL-SCNC: 25 MMOL/L (ref 21–32)
CO2 SERPL-SCNC: 29 MMOL/L (ref 21–32)
CREAT BLD-MCNC: 0.83 MG/DL
CREAT BLD-MCNC: 0.99 MG/DL
EGFRCR SERPLBLD CKD-EPI 2021: 58 ML/MIN/1.73M2 (ref 60–?)
EGFRCR SERPLBLD CKD-EPI 2021: 72 ML/MIN/1.73M2 (ref 60–?)
EOSINOPHIL # BLD AUTO: 0.01 X10(3) UL (ref 0–0.7)
EOSINOPHIL NFR BLD AUTO: 0.2 %
ERYTHROCYTE [DISTWIDTH] IN BLOOD BY AUTOMATED COUNT: 15.1 %
GLOBULIN PLAS-MCNC: 2.7 G/DL (ref 2–3.5)
GLUCOSE BLD-MCNC: 174 MG/DL (ref 70–99)
GLUCOSE BLD-MCNC: 93 MG/DL (ref 70–99)
HCT VFR BLD AUTO: 35.2 %
HGB BLD-MCNC: 11.4 G/DL
IMM GRANULOCYTES # BLD AUTO: 0.02 X10(3) UL (ref 0–1)
IMM GRANULOCYTES NFR BLD: 0.4 %
INR BLD: 2.4 (ref 0.8–1.2)
INR BLD: 2.71 (ref 0.8–1.2)
IRON SATN MFR SERPL: 7 %
IRON SERPL-MCNC: 29 UG/DL
LYMPHOCYTES # BLD AUTO: 1.87 X10(3) UL (ref 1–4)
LYMPHOCYTES NFR BLD AUTO: 40.1 %
MAGNESIUM SERPL-MCNC: 1.8 MG/DL (ref 1.6–2.6)
MCH RBC QN AUTO: 26.1 PG (ref 26–34)
MCHC RBC AUTO-ENTMCNC: 32.4 G/DL (ref 31–37)
MCV RBC AUTO: 80.7 FL
MONOCYTES # BLD AUTO: 1.11 X10(3) UL (ref 0.1–1)
MONOCYTES NFR BLD AUTO: 23.8 %
NEUTROPHILS # BLD AUTO: 1.63 X10 (3) UL (ref 1.5–7.7)
NEUTROPHILS # BLD AUTO: 1.63 X10(3) UL (ref 1.5–7.7)
NEUTROPHILS NFR BLD AUTO: 35.1 %
OSMOLALITY SERPL CALC.SUM OF ELEC: 298 MOSM/KG (ref 275–295)
OSMOLALITY SERPL CALC.SUM OF ELEC: 300 MOSM/KG (ref 275–295)
P AXIS: 41 DEGREES
P-R INTERVAL: 220 MS
PLATELET # BLD AUTO: 142 10(3)UL (ref 150–450)
POTASSIUM SERPL-SCNC: 3.2 MMOL/L (ref 3.5–5.1)
POTASSIUM SERPL-SCNC: 3.4 MMOL/L (ref 3.5–5.1)
PROT SERPL-MCNC: 6.9 G/DL (ref 5.7–8.2)
PROTHROMBIN TIME: 26.4 SECONDS (ref 11.6–14.8)
PROTHROMBIN TIME: 29 SECONDS (ref 11.6–14.8)
Q-T INTERVAL: 534 MS
QRS DURATION: 184 MS
QTC CALCULATION (BEZET): 567 MS
R AXIS: 162 DEGREES
RBC # BLD AUTO: 4.36 X10(6)UL
SODIUM SERPL-SCNC: 142 MMOL/L (ref 136–145)
SODIUM SERPL-SCNC: 143 MMOL/L (ref 136–145)
T AXIS: 10 DEGREES
TOTAL IRON BINDING CAPACITY: 411 UG/DL (ref 250–425)
TRANSFERRIN SERPL-MCNC: 316 MG/DL (ref 250–380)
TROPONIN I SERPL HS-MCNC: 17 NG/L
TROPONIN I SERPL HS-MCNC: 17 NG/L
TSI SER-ACNC: 1.42 UIU/ML (ref 0.55–4.78)
VENTRICULAR RATE: 68 BPM
WBC # BLD AUTO: 4.7 X10(3) UL (ref 4–11)

## 2025-03-10 PROCEDURE — 80053 COMPREHEN METABOLIC PANEL: CPT | Performed by: NURSE PRACTITIONER

## 2025-03-10 PROCEDURE — 83550 IRON BINDING TEST: CPT | Performed by: NURSE PRACTITIONER

## 2025-03-10 PROCEDURE — 85025 COMPLETE CBC W/AUTO DIFF WBC: CPT | Performed by: NURSE PRACTITIONER

## 2025-03-10 PROCEDURE — 83735 ASSAY OF MAGNESIUM: CPT | Performed by: NURSE PRACTITIONER

## 2025-03-10 PROCEDURE — 84484 ASSAY OF TROPONIN QUANT: CPT | Performed by: NURSE PRACTITIONER

## 2025-03-10 PROCEDURE — 93306 TTE W/DOPPLER COMPLETE: CPT | Performed by: INTERNAL MEDICINE

## 2025-03-10 PROCEDURE — 83540 ASSAY OF IRON: CPT | Performed by: NURSE PRACTITIONER

## 2025-03-10 PROCEDURE — 85610 PROTHROMBIN TIME: CPT | Performed by: HOSPITALIST

## 2025-03-10 PROCEDURE — 94760 N-INVAS EAR/PLS OXIMETRY 1: CPT

## 2025-03-10 PROCEDURE — 84443 ASSAY THYROID STIM HORMONE: CPT | Performed by: INTERNAL MEDICINE

## 2025-03-10 RX ORDER — SENNOSIDES 8.6 MG
17.2 TABLET ORAL NIGHTLY PRN
Status: DISCONTINUED | OUTPATIENT
Start: 2025-03-10 | End: 2025-03-11

## 2025-03-10 RX ORDER — ATORVASTATIN CALCIUM 20 MG/1
20 TABLET, FILM COATED ORAL NIGHTLY
Status: DISCONTINUED | OUTPATIENT
Start: 2025-03-10 | End: 2025-03-10 | Stop reason: SDUPTHER

## 2025-03-10 RX ORDER — ACETAMINOPHEN 500 MG
1000 TABLET ORAL EVERY 6 HOURS PRN
Status: DISCONTINUED | OUTPATIENT
Start: 2025-03-10 | End: 2025-03-11

## 2025-03-10 RX ORDER — AMIODARONE HYDROCHLORIDE 200 MG/1
200 TABLET ORAL DAILY
Status: DISCONTINUED | OUTPATIENT
Start: 2025-03-10 | End: 2025-03-11

## 2025-03-10 RX ORDER — POTASSIUM CHLORIDE 1500 MG/1
40 TABLET, EXTENDED RELEASE ORAL ONCE
Status: COMPLETED | OUTPATIENT
Start: 2025-03-10 | End: 2025-03-10

## 2025-03-10 RX ORDER — ATORVASTATIN CALCIUM 20 MG/1
20 TABLET, FILM COATED ORAL NIGHTLY
Status: DISCONTINUED | OUTPATIENT
Start: 2025-03-10 | End: 2025-03-11

## 2025-03-10 RX ORDER — METOPROLOL TARTRATE 25 MG/1
25 TABLET, FILM COATED ORAL
Status: DISCONTINUED | OUTPATIENT
Start: 2025-03-10 | End: 2025-03-11

## 2025-03-10 RX ORDER — WARFARIN SODIUM 2 MG/1
2 TABLET ORAL
Status: COMPLETED | OUTPATIENT
Start: 2025-03-10 | End: 2025-03-10

## 2025-03-10 RX ORDER — ONDANSETRON 2 MG/ML
4 INJECTION INTRAMUSCULAR; INTRAVENOUS EVERY 6 HOURS PRN
Status: DISCONTINUED | OUTPATIENT
Start: 2025-03-10 | End: 2025-03-10

## 2025-03-10 RX ORDER — WARFARIN SODIUM 2.5 MG/1
1.25 TABLET ORAL AS DIRECTED
COMMUNITY
End: 2025-03-24 | Stop reason: ALTCHOICE

## 2025-03-10 RX ORDER — MAGNESIUM OXIDE 400 MG/1
400 TABLET ORAL ONCE
Status: COMPLETED | OUTPATIENT
Start: 2025-03-10 | End: 2025-03-10

## 2025-03-10 RX ORDER — CALCIUM CARBONATE 500 MG/1
1000 TABLET, CHEWABLE ORAL 3 TIMES DAILY PRN
Status: DISCONTINUED | OUTPATIENT
Start: 2025-03-10 | End: 2025-03-11

## 2025-03-10 RX ORDER — FUROSEMIDE 10 MG/ML
40 INJECTION INTRAMUSCULAR; INTRAVENOUS
Status: DISCONTINUED | OUTPATIENT
Start: 2025-03-10 | End: 2025-03-11

## 2025-03-10 RX ORDER — POLYETHYLENE GLYCOL 3350 17 G/17G
17 POWDER, FOR SOLUTION ORAL DAILY PRN
Status: DISCONTINUED | OUTPATIENT
Start: 2025-03-10 | End: 2025-03-11

## 2025-03-10 RX ORDER — EPLERENONE 25 MG/1
25 TABLET ORAL DAILY
Status: DISCONTINUED | OUTPATIENT
Start: 2025-03-10 | End: 2025-03-11

## 2025-03-10 RX ORDER — BISACODYL 10 MG
10 SUPPOSITORY, RECTAL RECTAL
Status: DISCONTINUED | OUTPATIENT
Start: 2025-03-10 | End: 2025-03-11

## 2025-03-10 NOTE — ED PROVIDER NOTES
Patient Seen in: [facility] Emergency Department      History     Chief Complaint   Patient presents with    Difficulty Breathing     Stated Complaint: SOB, fluid retention    Subjective:   HPI      80-year-old female presenting with shortness of breath.  Patient is been have cough congestion for the past week but over the last 2 nights patient is having trouble laying down because she gets short of breath over the past week she has used torsemide with some improvement of her leg swelling but she is getting more short of breath and having trouble sleeping because this prompting her visit here she denies any other exacerbating relieving factors or associated symptoms.    Objective:     Past Medical History:    Arrhythmia    ATRIAL FIBRILLATION    Dr. 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     Cancer (HCC)    CORONARY ARTERY DISEASE    Endocrine disorder    Gout    History of blood transfusion    Hypertension    Lymphedema of upper extremity following lymphadenectomy    Migraines    Osteopenia    Other and unspecified hyperlipidemia    Unspecified essential hypertension    Visual impairment              Past Surgical History:   Procedure Laterality Date    Angiogram      Appendectomy      Appendectomy      Cabg  2004    Hysterectomy  1997    MIKE/BSO- fibroid uterus    Lumpectomy left  01/2013    invasive ductal    Yue localization wire 1 site left (cpt=19281)  1998;1996    Needle biopsy left  2012    us core bx-papilloma    Needle biopsy right  02/2013    mri bx-sclerosing adenosis    Oophorectomy  1997    Other  2015    thoracic aortic aneurysm repair    Other accessory      cardiac ablation/ failed per pt    Other surgical history  2004    Surgery for aortic dissection- aortic root repair, aortic valve replacement, single CABG    Other surgical history       left breast biopsy in 1996 and 1998    Other surgical history  03/01/2013     re-excision left lumpectomy    Radiation left  2013                Social History     Socioeconomic History    Marital status:    Tobacco Use    Smoking status: Former     Current packs/day: 0.00     Types: Cigarettes     Quit date: 1988     Years since quittin.1    Smokeless tobacco: Never   Vaping Use    Vaping status: Never Used   Substance and Sexual Activity    Alcohol use: Yes    Drug use: No   Other Topics Concern    Seat Belt Yes     Social Drivers of Health     Food Insecurity: No Food Insecurity (5/15/2024)    Food Insecurity     Food Insecurity: Never true   Transportation Needs: No Transportation Needs (5/15/2024)    Transportation Needs     Lack of Transportation: No   Housing Stability: Low Risk  (5/15/2024)    Housing Stability     Housing Instability: No                  Physical Exam     ED Triage Vitals [25]   /72   Pulse 69   Resp 18   Temp 97.7 °F (36.5 °C)   Temp src    SpO2 97 %   O2 Device None (Room air)       Current Vitals:   Vital Signs  BP: 130/82  Pulse: 66  Resp: 23  Temp: 97.7 °F (36.5 °C)  MAP (mmHg): 96    Oxygen Therapy  SpO2: 97 %  O2 Device: None (Room air)        Physical Exam  Awake alert patient appears no distress HEENT exam is normal lungs were decreased breath sounds in the bases cardiovascular exam shows regular rhythm abdomen soft and nontender extremities no clubbing cyanosis or edema no rashNo focal neurologic deficits    ED Course     Labs Reviewed   COMP METABOLIC PANEL (14) - Abnormal; Notable for the following components:       Result Value    Glucose 106 (*)     Calculated Osmolality 300 (*)     eGFR-Cr 59 (*)     AST 46 (*)     All other components within normal limits   CBC WITH DIFFERENTIAL WITH PLATELET - Abnormal; Notable for the following components:    HGB 11.3 (*)     .0 (*)     Monocyte Absolute 1.08 (*)     All other components within normal limits   PRO BETA NATRIURETIC PEPTIDE - Abnormal; Notable for the  following components:    Pro-Beta Natriuretic Peptide 11,863 (*)     All other components within normal limits   TROPONIN I HIGH SENSITIVITY - Normal   RAINBOW DRAW BLUE   SARS-COV-2/FLU A AND B/RSV BY PCR (GENEXPERT)     EKG    Rate, intervals and axes as noted on EKG Report.  Rate: 68  Rhythm: Sinus Rhythm  Reading: Right bundle branch block noted                Differential diagnosis includes acute pulm edema, pneumonia, sepsis       MDM          Admission disposition: 3/9/2025 11:07 PM           Medical Decision Making  80-year-old female presenting to the emergency department for shortness of breath and orthopnea.  IV established cardiac monitor shows a sinus rhythm pulse ox shows no signs of hypoxia renal function CBC stable.  Level BNP level elevated independent interpretation by ED physician chest x-ray shows no consolidation patient was ordered Lasix will be admitted at this time    Problems Addressed:  SOB (shortness of breath) on exertion: acute illness or injury    Amount and/or Complexity of Data Reviewed  Labs: ordered. Decision-making details documented in ED Course.  Radiology: ordered and independent interpretation performed. Decision-making details documented in ED Course.  ECG/medicine tests: ordered and independent interpretation performed. Decision-making details documented in ED Course.    Troponin shows no elevation indicative of NSTEMI metabolic panel stable    Disposition and Plan     Clinical Impression:  1. SOB (shortness of breath) on exertion         Disposition:  Admit  3/9/2025 11:07 pm    Follow-up:  No follow-up provider specified.        Medications Prescribed:  Current Discharge Medication List              Supplementary Documentation:         Hospital Problems       Present on Admission  Date Reviewed: 3/9/2025            ICD-10-CM Noted POA    * (Principal) SOB (shortness of breath) on exertion R06.02 3/9/2025 Unknown

## 2025-03-10 NOTE — DIETARY NOTE
[facility]   part of [facility]   CLINICAL NUTRITION    Angeles Anand     Admitting diagnosis:  SOB (shortness of breath) on exertion [R06.02]    Ht: 162.6 cm (5' 4\")  Wt: 51.9 kg (114 lb 6.7 oz).   Body mass index is 19.64 kg/m².  IBW: 54.5 kg    Wt Readings from Last 6 Encounters:   03/10/25 51.9 kg (114 lb 6.7 oz)   01/15/25 53.5 kg (118 lb)   24 52.2 kg (115 lb)   24 53.5 kg (118 lb)   24 53.3 kg (117 lb 9.6 oz)   24 56.7 kg (125 lb)        Labs/Meds reviewed  -Glu:174, K+:3.4, M.8, BUN:18, Cr:0.99, GFR:55, elevated BNP  -Kcl:40 meq, Mag Ox, Lasix, Warfarin    Diet:       Procedures    Cardiac diet Sodium Restriction: 2 GM NA; Fluid Restriction: 2000 ml; Is Patient on Accuchecks? No     Percent Meals Eaten (last 3 days)       Date/Time Percent Meals Eaten (%)    03/10/25 0900 0 %    03/10/25 1047 0 %    03/10/25 1100 100 %     Percent Meals Eaten (%): Per pt at 03/10/25 1100    03/10/25 1227 0 %    03/10/25 1504 80 %          Pt chart reviewed d/t nutrition consult for HF diet ed, at risk, and MST score of 3.    Discussed rationale for low sodium diet. Provided handout on Low Sodium Nutrition Therapy w/ list of foods recommended, foods to avoid/limit, sample menus, and list of salt free seasoning alternatives.   Pt stated she does not add salt to food. All nutrition related questions answered at this time.    Pt's spouse present at bedside and aided w/ wt/diet hx.  Pt reported no appetite, early satiety, intermittent nausea/dry heaving (no vomiting) for the past 10 days, which she contributes to fluid accumulation.Will occasionally consume van Ensure mixed w/ strawberry ice cream at home.   Recorded PO intakes:% of smaller meals. Consumed 100% of cheerios for breakfast and 50% of salad and 100% of yogurt parfait for lunch today. Will liberalize diet to 3-4 gm Na++ to promote increased PO intakes.  Encouraged pt to consume smaller more frequent meals, if easier.  Pt  agreeable to try berry Magic Shake daily at lunch. Will order Magic Shake for dinner tonight, since pt stated she does not feel like ordering dinner.    -Per EMR hx, pt may have lost 4 lb (3.4%) in about 2 months, which is not significant wt loss per standards (118 lb down to 114 lb).   -Pt's spouse reported 5 lb wt gain d/t fluid.     PMH:former tobacco use, S2 breast CA, RT, HTN, HLD, CAD, CABG, AAA repair.  FOOD ALLERGIES: kiwi extract.    Patient is at low nutrition risk at this time.    Please consult if patient status changes or nutrition issues arise.    Augustina Ovalle MS, RD, LDN  Clinical Dietitian  Ext:[phone]

## 2025-03-10 NOTE — ED QUICK NOTES
Orders for admission, patient is aware of plan and ready to go upstairs. Any questions, please call ED RN Shelbi at extension [phone].     Patient Covid vaccination status: Fully vaccinated     COVID Test Ordered in ED: SARS-CoV-2/Flu A and B/RSV by PCR (GeneXpert)    COVID Suspicion at Admission: N/A    Running Infusions:  None    Mental Status/LOC at time of transport: a/o x4    Other pertinent information:   CIWA score: N/A   NIH score:  N/A

## 2025-03-10 NOTE — PLAN OF CARE
Angeles Anand Patient Status:  Observation    3/6/1945 MRN [id number]   Location [facility] 2NE-A Attending Anish Francois MD   Hosp Day # 0 PCP Bettie Israel DO     Cardiology Nocturnal APN Note    Briefly: (Documentation from chart review)     Angeles Anand is a  80 F who presented with  increasing shortness of breath unable to lay down due to dyspnea aslo with cough congestion for past week Changed recently to torsemide with some improvement in legs.  Difficulty sleeping due to sob.  and has a PMH/PSH of:       Past Medical History:    Arrhythmia    ATRIAL FIBRILLATION    Dr. 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     Cancer (HCC)    CORONARY ARTERY DISEASE    Endocrine disorder    Gout    History of blood transfusion    Hypertension    Lymphedema of upper extremity following lymphadenectomy    Migraines    Osteopenia    Other and unspecified hyperlipidemia    Unspecified essential hypertension    Visual impairment       Primary Cardiologist Charlie/ Luiz     Vital Signs:       3/10/2025    12:41 AM 3/10/2025     4:29 AM   Vitals History   /82 129/70   BP Location Right arm Right arm   Pulse 75 72   Resp 19 16   Temp  98.2 °F (36.8 °C)   SpO2 96 % 89 %        Labs:   Lab Results   Component Value Date    WBC 4.9 2025    HGB 11.3 2025    HCT 35.8 2025    .0 2025    CREATSERUM 0.97 2025    BUN 23 2025     2025    K 3.8 2025     2025    CO2 23.0 2025     2025    CA 8.9 2025    ALB 4.1 2025    ALKPHO 99 2025    BILT 1.0 2025    TP 7.1 2025    AST 46 2025    ALT 43 2025    INR 2.71 2025    TROPHS 17 2025       Diagnostics:   XR CHEST AP PORTABLE  (CPT=71045)    Result Date: 3/9/2025  PROCEDURE:  XR CHEST AP PORTABLE  (CPT=71045)  TECHNIQUE:  AP chest radiograph was obtained.  COMPARISON:   [facility] , XR, XR CHEST AP PORTABLE  (CPT=71045), 1/21/2024, 6:54 AM.  INDICATIONS:  SOB, fluid retention  PATIENT STATED HISTORY: (As transcribed by Technologist)  Patient offered no additional history at this time.    FINDINGS:  There is stable cardiomegaly.  There is subsegmental atelectasis at the lung bases.  The mediastinum wires are stable.  There are no pleural effusions.            CONCLUSION:  Cardiomegaly with subsegmental atelectasis at the lung bases.   LOCATION:  [facility]      Dictated by (CST): Dann Reynaga MD on 3/09/2025 at 10:40 PM     Finalized by (CST): Dann Reynaga MD on 3/09/2025 at 10:41 PM       Echo 1/10/2025  There is LVE. Global left ventricular systolic function is severely decreased. The left ventricular ejection fraction is 25-30%.   Mild mitral annular calcification is noted. Mild-moderate mitral regurgitation.   A SAVR has been performed. The trans-aortic peak velocity is 2.2 m/s. The trans-aortic mean gradient is 10.0 mmHg. DI = 0.33. Mild perivalvular aortic regurgitation.  Mild to moderate (1-2+) tricuspid regurgitation.  The estimated pulmonary artery systolic pressure is 52 mmHg assuming a right atrial pressure of 8 mmHg.                  *   Allergies:  Allergies[1]    Medications:    furosemide    acetaminophen    melatonin    polyethylene glycol (PEG 3350)    sennosides    bisacodyl    calcium carbonate    metoprolol tartrate    amiodarone    atorvastatin    Assessment:   EKG shows SR 1 avb with pacs rate 68  CxR shows Cardiomegaly with subsegmental atelectasis at the lung bases.  Trop 17 pBNP 11,863   INR 2.71   K 3.8 creat 0.97  WBC 4.9 HH 11.3/35.8   plts 147   EF 25-30%   mild - mod MR mild - mod TR pasp est 52 RAP 8   Vs   98.2 - 72 - 16 - 129/70 O2 sats 96%    IV lasix 40 mg IV given in ED     Plan:  Trend trops likely due to demand given fluid overload if indicated to get ekg  Consider echo in am (above 1/10/25)  Continue to diurese   Strict I/O  Daily weights  CHF  instruction/reinforcement   - Continue to monitor overnight  - Formal Cardiology consult to follow in AM.       Esperanza Berg NP  [facility]  3/10/2025  4:39 AM         [1]   Allergies  Allergen Reactions    Radiology Contrast Iodinated Dyes HIVES and RASH    Gadolinium     Kiwi Extract SWELLING

## 2025-03-10 NOTE — PROGRESS NOTES
[facility] Pharmacy Dosing Service  Warfarin (Coumadin) Initial Dosing    Angeles Anand is a 80 year old patient for whom pharmacy has been consulted to dose warfarin (COUMADIN) for Prevention of systemic embolism by Dr. Stewart.  Based on this indication, goal INR is 2-3.    Pertinent Patient Medical History:  Afib  Potential Drug Interactions:  Amiodarone    Latest INR:   Recent Labs     03/10/25  0639   INR 2.40*       Other Anticoagulants:  none  Home regimen (if applicable):  2.5 mg Sun, Tues, Thurs; 1.25 mg ROW  Date/Time last dose was given (if applicable): 3/9 PM    Based on above -  1.  For today, Give warfarin (COUMADIN) 2 mg at 2100 tonight    2.  PT/INR ordered daily while on warfarin    3.  Pharmacy will continue to follow.  We appreciate the opportunity to assist in the care of this patient.    Vicky De La Cruz, PharmD  3/10/2025  9:19 AM

## 2025-03-10 NOTE — HISTORICAL OFFICE NOTE
Facility Logo [facility]  [address] [place] [address], [address] [place] [address]  [phone]      Angeles Anand  Progress Note  Demographics:  Name: Angeles Anand YOB: 1945  Age: 79, Female Medical Record No: [id number]  Visited Date/Time: 02/25/2025 11:45 AM    Chief Complaints  1 month follow up  History of Present Illness  Follow-up visit.  Still with significant exertional dyspnea.    Nice regular rhythm.  Her  watches over her carefully.  She has been steady in the 60s.    She did feel like she was gaining some weight and took 1 torsemide.  She lost several pounds.    Lungs are clear.  No edema today.  Cardiac risk factors Hypertension, Dyslipidemia and Former smoker  Past Medical History  1.Allergy to iodine  2.Monitoring for long-term anticoagulant use  3.Atrial fibrillation (Afib), paroxysmal - A Fib  4.Atrial Flutter, unspecified  5.CAD (coronary artery disease)  6.Aneurysm, thoracic aortic  7.Hx of CABG  8.Essential hypertension  9.Pure hypercholesterolemia  Family History  No significant family history noted.  Social History  Smoking status Former smoker  Tobacco usage - No (Non-smoker (finding))  Review of systems  Constitutional No history of Fevers  Eyes No history of Double vision  ENT No history of Bloody nose (epistaxis)  Gastrointestinal No history of Abdominal pain  Cardiovascular [place]  Genitourinary No history of Hematuria  Musculoskeletal Muscle weakness and Arthritis  Respiratory No history of Hemoptysis  Neurological No history of Ataxia  Endocrine No history of Orthostatic symptoms  Psychiatric No history of Drug abuse  Hem/Lymphatic No history of Blood clots  Allergy Immunology No history of Hives  Integumentary No history of Rashes  Physical Examination  Vitals Right Arm Sitting  / 78 mmHg, Pulse rate 62 bpm, Regular, Height in 5' 4\", BMI: 19.7, Weight in 115 lbs (or) 52.16 kgs and BSA : 1.53 cc/m²  General Appearance No Acute  Distress  Head/Eyes/Ears/Nose/Mouth/Throat No icterus  Neck Normal carotid pulsations  Respiratory Lungs clear with normal breath sounds  Cardiovascular Regular rhythm.    Gastrointestinal Abdomen soft  Lower Extremities No edema  Skin Warm and dry  Neurologic / Psychiatric Non-focal  Allergies  1.Iodine and/or iodine compound (substance) [Snomed:[id number]](Reaction:Hives [Snomed:[id number]], Severity:Moderate)  2.Kiwi(Reaction:, Severity:Mild)  3.Kiwi (Food Or Med)(Reaction:, Severity:Mild)  Medications  1.amiodarone 200 mg tablet, Take 1 tablet orally once a day.  2.amoxicillin (AMOXIL) 500 MG tablet, 500 mg. For dental procedures  3.atorvastatin 20 mg tablet, Take 1 tablet orally once a day.  4.Augmentin 500 mg-125 mg tablet, Take 1 tablet orally once a day for 10 days  5.calcium acetate 667 mg tablet, Take 1 tablet orally 2 times a day.  6.cyanocobalamin (vit B-12) 500 mcg tablet, Take 1 tablet orally once every other day.  7.Eplerenone 25 Mg Tab Brec, TAKE 1 TABLET BY MOUTH ONE TIME DAILY  8.metoprolol tartrate 25 mg tablet, Take 1 tablet orally once a day.  9.TylenoL 325 mg tablet, Take 1 tablet orally every 4-6 hours as needed.  10.warfarin 2.5 mg tablet, Take 1 tablet orally once in the evening as instructed by warfarin clinic [phone]  Impression  1.Allergy to iodine  2.Monitoring for long-term anticoagulant use  3.Atrial fibrillation (Afib), paroxysmal - A Fib  4.Atrial Flutter, unspecified  5.CAD (coronary artery disease)  6.Aneurysm, thoracic aortic  7.Hx of CABG  8.Essential hypertension  9.Pure hypercholesterolemia  Assessment & Plan  Compensated LV dysfunction with prior bypass surgery, aortic valve replacement and surgical repair of an ascending thoracic aneurysm.  Has developed significant LV dysfunction in the setting of a wide QRS and paroxysmal atrial arrhythmia.    She seems to be maintaining sinus rhythm.  She was a bit hesitant to consider a pacemaker/defibrillator.    She needs an EKG  today.    I would like her to have a follow-up echocardiogram in April.  This should be mid to late April.  If she does not show improvement in ventricular function I would lean toward cardiac resynchronization therapy and I will have to adjust her cardiovascular regimen [facility]    Please reschedule her visit with Dr. Dee from  to very early in May.    I will see her in 3 months.  Labs and Diagnostics ordered  1.EKG (electrocardiogram) (Today)  2.Echocardiography - Complete ([facility]) (Schedule next available)  Future appointments  1.Follow up visit - Ritesh Guerra MD (3 Months)  2.Follow up visit - Pete Dee MD early may (Schedule next available)  Miscellaneous  1.Weight monitoring (regime/therapy)  Nurses documentation  Upcoming surgeries: no  Use of assistive devices(s): no  Refills: no  EKG: no  Triage & medication list reviewed by: ALICE WALDRON   Patient instructions  Medications:   1. Continue current medications.    Testin. Echocardiogram Mid April     Provider Follow Up:  1. Follow up with Dr. Guerra in 3 months   2.Follow up with Dr. Dee Early May     Please bring in you medication bottles or updated medicine list to your next appointment.  Call [facility] if you have any problems or concerns at [phone]   Lab Details  POCT SAMRA  2025 12:44:19 PM  POC INR 3.40 0.90-1.10 H F  INR  2025 12:00:00 AM  INR 3.4 2-3 RATIO H F  FREE T4 (FREE THYROXINE)  2024 10:52:51 AM  FREE T4 1.6 0.8-1.7 ng/dL  F  ASSAY, THYROID STIM HORMONE  2024 10:52:51 AM  TSH 2.922 0.550-4.780 mIU/mL  F  PROTHROMBIN TIME (PT)  2024 12:39:33 PM  PT 32.0 11.6-14.8 seconds H F  INR 3.05 0.80-1.20 H F  BASIC METABOLIC PANEL (8)  2024 11:20:14 AM  GLUCOSE 135 70-99 mg/dL H F  SODIUM 139 136-145 mmol/L  F  POTASSIUM 4.1 3.5-5.1 mmol/L  F  CHLORIDE 107  mmol/L  F  CO2 23.0 21.0-32.0 mmol/L  F  ANION GAP 9 0-18 mmol/L  F  BUN 18 9-23  mg/dL  F  CREATININE 0.87 0.55-1.02 mg/dL  F  CALCIUM 9.4 8.5-10.1 mg/dL  F  OSMOLALITY CALCULATED 292 275-295 mOsm/kg  F  E GFR CR 68 >=60 mL/min/1.73m2  F  FASTING PATIENT BMP ANSWER Yes   F  CBC, PLATELET; NO DIFFERENTIAL  05/08/2024 11:02:57 AM  WBC 2.8 4.0-11.0 x10(3) uL L F  RED BLOOD COUNT 4.63 3.80-5.30 x10(6)uL  F  HGB 13.2 12.0-16.0 g/dL  F  HCT 41.4 35.0-48.0 %  F  PLATELETS 162.0 150.0-450.0 10(3)uL  F  MEAN CELL VOLUME 89.4 80.0-100.0 fL  F  MEAN CORPUSCULAR HEMOGLOBIN 28.5 26.0-34.0 pg  F  MEAN CORPUSCULAR HGB CONC 31.9 31.0-37.0 g/dL  F  RED CELL DISTRIBUTION WIDTH CV 15.4 %  F  Diagnostics Details  Trans Thoracic Echocardiogram 01/10/2025  1.There is LVE. Global left ventricular systolic function is severely decreased. The left ventricular ejection fraction is 25-30%.    2.Mild mitral annular calcification is noted. Mild-moderate mitral regurgitation.    3.A SAVR has been performed. The trans-aortic peak velocity is 2.2 m/s. The trans-aortic mean gradient is 10.0 mmHg. DI = 0.33. Mild perivalvular aortic regurgitation.    4.Mild to moderate (1-2+) tricuspid regurgitation.    5.The estimated pulmonary artery systolic pressure is 52 mmHg assuming a right atrial pressure of 8 mmHg.    ACTMonitoring 11/04/2024  1.This is a good quality study.    2.Predominant rhythm is sinus bradycardia.    3.The minimum heart rate recorded was 51 beats / minute. The maximum heart rate is 91 beats / minute. The mean heart rate is 60 beats / minute.    4.First degree AV block noted.    5.Rare premature atrial contractions noted.    6.AFIB [place]: <1%    7.Rare premature ventricular contractions noted.    8.No evidence of ventricular tachycardia is noted.    9.No evidence of supraventricular arrhythmia is noted.    10.No evidence of ventricular arrhythmia is noted.    11.No pauses were noted.    12.Telemetry Tech: Gricelda Walsh no clear evidence of Afib or third degree AV block    CPOE Orders carried out by: Malou  Lula  Care Providers: Ritesh Guerra MD, Eliane JENKINS and Malou Cotton  Electronically Authenticated by  Ritesh Guerra MD  02/26/2025 04:37:59 PM  Disclaimer: Components of this note were documented using voice recognition system and are subject to errors not corrected at proofreading. Contact the author of this note for any clarifications.

## 2025-03-10 NOTE — PROGRESS NOTES
03/10/25 0037 03/10/25 0039 03/10/25 0041   Vital Signs   /76 137/76 132/82   MAP (mmHg) 95 95 95   BP Location Right arm Right arm Right arm   BP Method Automatic Automatic Automatic   Patient Position Lying Sitting Standing     Pt denies symptoms when changing position

## 2025-03-10 NOTE — PLAN OF CARE
Assumed care of patient around 0730.Patient AXOX4.On room air.NSR on tele.No c/o pain.Remains on IV diuretics,DW,I/O.2D echo done,results pending.Call light within reach.Plan of care updated.Patient up in chair.  Problem: CARDIOVASCULAR - ADULT  Goal: Maintains optimal cardiac output and hemodynamic stability  Description: INTERVENTIONS:  - Monitor vital signs, rhythm, and trends  - Monitor for bleeding, hypotension and signs of decreased cardiac output  - Evaluate effectiveness of vasoactive medications to optimize hemodynamic stability  - Monitor arterial and/or venous puncture sites for bleeding and/or hematoma  - Assess quality of pulses, skin color and temperature  - Assess for signs of decreased coronary artery perfusion - ex. Angina  - Evaluate fluid balance, assess for edema, trend weights  Outcome: Progressing  Goal: Absence of cardiac arrhythmias or at baseline  Description: INTERVENTIONS:  - Continuous cardiac monitoring, monitor vital signs, obtain 12 lead EKG if indicated  - Evaluate effectiveness of antiarrhythmic and heart rate control medications as ordered  - Initiate emergency measures for life threatening arrhythmias  - Monitor electrolytes and administer replacement therapy as ordered  Outcome: Progressing

## 2025-03-10 NOTE — DISCHARGE INSTRUCTIONS
Going Home Instructions  In this section you will find the tools which will guide you through the first few days after you leave the hospital. Continued use of these tools will help you develop the skills necessary to keep your heart failure under control.     Home Care Instructions Following Heart Failure - the most important things to do every day include:   Weigh yourself and review the “Self-Check Plan” sheet every morning.   Call your cardiologist office if you are in the “Pay Attention-Use Caution” (yellow zone) or “Medical Alert-Warning!” (red zone) as outlined in the Self-Check Plan sheet.  Take your medicines as prescribed.  Limit your sodium (salt) intake.  Know when to call your cardiologist, primary doctor, or nurse.  Know when to seek emergency care.      Things for You to Remember:   1. See your doctor or healthcare provider as written on your discharge instructions.  It is important that you attend this appointment to make sure your symptoms are under control.     2. Your recommended sodium intake is 8675-0504 mg daily.    3.  Weigh yourself every day.    4. Some exercise and activity is important to help keep your heart functioning and strong. Unless instructed not to exercise, you may walk at a slow to moderate pace for 10-15 minutes 2-3 days per week to start. Pace your activity to prevent shortness of breath or fatigue. Stop exercising if you develop chest pain, lightheadedness, or significant shortness of breath.       Call Your Cardiologist If:   You gain 2-3 pounds in one day or 5 pounds in one week.  You have more difficulty breathing.  You are getting more tired with normal activity.  You are more short of breath lying down, or awaken at night short of breath.  You have swelling of your feet or legs.  You urinate less often during the day and more often at night.  You have cramps in your legs.  You have blurred vision or see yellowish-green halos around objects of lights.    Go to the  Emergency Room If:   You have pain or tightness in your chest  You are extremely short of breath  You are coughing up pink-frothy mucus  You are traveling and develop symptoms of worsening heart failure      ** Please follow up with your cardiologist or Advanced Practice Provider as written on your discharge instructions. If you are not provided with an appointment, let your nurse know so you can get an appointment**

## 2025-03-10 NOTE — ED INITIAL ASSESSMENT (HPI)
Patient coming in for increased shortness of breath and fluid retention over the last week. States since last night she is unable to lay down due to the shortness of breath. No chest pain, but does have upper right back pain. Patient had recent echo done that showed EF of 20%

## 2025-03-10 NOTE — PLAN OF CARE
Pt transferred from ER to room 2612 @0015~. Aox4. RA, Nolan, Orthopnea, Coarse crackles. NSR w/1st Deg AVB, R-BBB, PAC's, QT .53. Pt reports chronic back and R-shoulder pain. Pt continent x2, purewick in place. 1x w/walker. LUE limb alert (Breast Cx Hx). Skin CDI, skin check completed with HEIDI SAMUELS Bed locked and in lowest position. Call light and personal items within reach.  PTA med rec provided by pt . Pt and patient  at bedside updated on POC.     POC:  -Cardiology to see  -IV Lasix BID  -DW/IO     Problem: CARDIOVASCULAR - ADULT  Goal: Maintains optimal cardiac output and hemodynamic stability  Description: INTERVENTIONS:  - Monitor vital signs, rhythm, and trends  - Monitor for bleeding, hypotension and signs of decreased cardiac output  - Evaluate effectiveness of vasoactive medications to optimize hemodynamic stability  - Monitor arterial and/or venous puncture sites for bleeding and/or hematoma  - Assess quality of pulses, skin color and temperature  - Assess for signs of decreased coronary artery perfusion - ex. Angina  - Evaluate fluid balance, assess for edema, trend weights  Reactivated  Goal: Absence of cardiac arrhythmias or at baseline  Description: INTERVENTIONS:  - Continuous cardiac monitoring, monitor vital signs, obtain 12 lead EKG if indicated  - Evaluate effectiveness of antiarrhythmic and heart rate control medications as ordered  - Initiate emergency measures for life threatening arrhythmias  - Monitor electrolytes and administer replacement therapy as ordered  Reactivated

## 2025-03-10 NOTE — CONSULTS
[facility]  Cardiac EP Consultation    Angeles Anand Patient Status:  Observation    3/6/1945 MRN [id number]   Location [facility] 2NE-A Attending Isacc Adams DO   Hosp Day # 0 PCP Bettie Israel DO     Consults      Reason for Consultation     CRT-D        History of Present Illness     Angeles Anand is a a(n) 80 year old female with SAVR/Aortic repair, CABG, AF/AFL here with Acute on chronic CHF.    -890 ml. She is feeling better today.    Wide QRS. On GDMT.    Prior RF PVI/CTI            History:     Past Medical History:    Arrhythmia    ATRIAL FIBRILLATION    Dr. 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     Cancer (HCC)    CORONARY ARTERY DISEASE    Endocrine disorder    Gout    History of blood transfusion    Hypertension    Lymphedema of upper extremity following lymphadenectomy    Migraines    Osteopenia    Other and unspecified hyperlipidemia    Unspecified essential hypertension    Visual impairment     Past Surgical History:   Procedure Laterality Date    Angiogram      Appendectomy      Appendectomy      Cabg      Hysterectomy      MIKE/BSO- fibroid uterus    Lumpectomy left  2013    invasive ductal    Yue localization wire 1 site left (cpt=19281)  ;    Needle biopsy left      us core bx-papilloma    Needle biopsy right  2013    mri bx-sclerosing adenosis    Oophorectomy      Other      thoracic aortic aneurysm repair    Other accessory      cardiac ablation/ failed per pt    Other surgical history  2004    Surgery for aortic dissection- aortic root repair, aortic valve replacement, single CABG    Other surgical history       left breast biopsy in  and     Other surgical history  2013    re-excision left lumpectomy    Radiation left  2013    Spine surgery procedure unlisted       Family History   Problem Relation Age of Onset    Heart Disorder Father     Diabetes Mother      Breast Cancer Self 68      reports that she quit smoking about 37 years ago. Her smoking use included cigarettes. She has never used smokeless tobacco. She reports current alcohol use of about 1.0 standard drink of alcohol per week. She reports that she does not use drugs.      Allergies:     Allergies[1]      Medications       Current Facility-Administered Medications:     furosemide (Lasix) 10 mg/mL injection 40 mg, 40 mg, Intravenous, BID (Diuretic)    acetaminophen (Tylenol Extra Strength) tab 1,000 mg, 1,000 mg, Oral, Q6H PRN    melatonin tab 3 mg, 3 mg, Oral, Nightly PRN    polyethylene glycol (PEG 3350) (Miralax) 17 g oral packet 17 g, 17 g, Oral, Daily PRN    sennosides (Senokot) tab 17.2 mg, 17.2 mg, Oral, Nightly PRN    bisacodyl (Dulcolax) 10 MG rectal suppository 10 mg, 10 mg, Rectal, Daily PRN    calcium carbonate (Tums) chewable tab 1,000 mg, 1,000 mg, Oral, TID PRN    metoprolol tartrate (Lopressor) tab 25 mg, 25 mg, Oral, Daily Beta Blocker    amiodarone (Pacerone) tab 200 mg, 200 mg, Oral, Daily    atorvastatin (Lipitor) tab 20 mg, 20 mg, Oral, Nightly    warfarin (Coumadin) tab 2 mg, 2 mg, Oral, Once at night    eplerenone (Inspra) tab 25 mg, 25 mg, Oral, Daily      Review of Systems     10 point ROS was negative except  Shortness of breath      Telemetry:         Telemetry: NSR      Physical Exam     Physical Exam   Blood pressure 106/58, pulse 66, temperature 97.4 °F (36.3 °C), temperature source Oral, resp. rate 18, height 5' 4\" (1.626 m), weight 114 lb 6.7 oz (51.9 kg), SpO2 96%, not currently breastfeeding.  Temp (24hrs), Av.8 °F (36.6 °C), Min:97.4 °F (36.3 °C), Max:98.2 °F (36.8 °C)    Wt Readings from Last 3 Encounters:   03/10/25 114 lb 6.7 oz (51.9 kg)   01/15/25 118 lb (53.5 kg)   24 115 lb (52.2 kg)     Well appearing  NAD  PERRLA/EOMI  Neck veins not elevated  Carotids- no bruits  CTA bilaterally  Cardiac- RRR. PANCHITO  Abdomen- Soft,Nontender, normal BS  Extremities- pulses  normal  Edema- none  Mood /Affect Congruent  Skin- no lesions            Lab/Radiology Results     Recent Labs   Lab 03/09/25  2216 03/10/25  0639 03/10/25  1049   * 93 174*   BUN 23 19 18   CREATSERUM 0.97 0.83 0.99   EGFRCR 59* 72 58*   CA 8.9 9.4 9.4    143 142   K 3.8 3.2* 3.4*    106 104   CO2 23.0 25.0 29.0     Recent Labs   Lab 03/09/25  2216 03/10/25  0639   RBC 4.31 4.36   HGB 11.3* 11.4*   HCT 35.8 35.2   MCV 83.1 80.7   MCH 26.2 26.1   MCHC 31.6 32.4   RDW 15.0 15.1   NEPRELIM 1.73 1.63   WBC 4.9 4.7   .0* 142.0*         [unfilled]  No results for input(s): \"BNP\" in the last 168 hours.  Lab Results   Component Value Date    PT 21.9 (H) 08/21/2013    PT 20.5 (H) 08/20/2013    PT 23.9 (H) 08/19/2013    INR 2.40 (H) 03/10/2025    INR 2.71 (H) 03/09/2025    INR 2.70 (H) 03/05/2025     Lab Results   Component Value Date    TROP 1.060 (HH) 12/08/2015    TROP 1.480 (HH) 12/08/2015    TROP 2.230 (HH) 12/08/2015         XR CHEST AP PORTABLE  (CPT=71045)    Result Date: 3/9/2025  CONCLUSION:  Cardiomegaly with subsegmental atelectasis at the lung bases.   LOCATION:  [facility]      Dictated by (CST): Dann Reynaga MD on 3/09/2025 at 10:40 PM     Finalized by (CST): Dann Reynaga MD on 3/09/2025 at 10:41 PM       EKG 12 Lead    Result Date: 3/10/2025  Sinus rhythm with 1st degree A-V block with Premature supraventricular complexes Right bundle branch block Abnormal ECG When compared with ECG of 15-MAY-2024 10:23, Premature supraventricular complexes are now Present T wave inversion less evident in Inferior leads Confirmed by MONICA CURRY ([id number]) on 3/10/2025 8:03:53 AM       Problem List     Patient Active Problem List   Diagnosis    L Breast cancer    Hypertension    Paroxysmal atrial fibrillation (HCC)    Osteopenia    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    Atherosclerosis of coronary artery    Hx of CABG    Pure hypercholesterolemia    RBBB    Medication monitoring encounter    S/P AVR  (aortic valve replacement) and aortoplasty    Aortic atherosclerosis    Atrial fibrillation with rapid ventricular response (HCC)    Acute on chronic congestive heart failure, unspecified heart failure type (HCC)    Compression fracture of body of thoracic vertebra (HCC)    SOB (shortness of breath) on exertion       Diagnostic Testing     ECG 3/10/25       TTE 3/10/25  Low EF 30%- final read pending         EP Procedures 5/2024    RF PVI/CTI         Assessment     Acute on Chronic CHF  Primarily Nonischemic, but mixed CM  Persistent AF- s/p RF PVI/CTI  Chadsvasc- 6 (Agex2, CHF, Gender, CAD, HTN)  Warfarin  SAVR/Aortic Aneurysm repair  RBBB/Right Axis Deviation-  ms      Plan     Given her wide RBBB ( > 150 ms) she meets criteria for CRT-D  Follow up with me in clinic.   I will message [facility] nurses to get a tentative date.        C5- Cardiac Electrophysiology Cons[place]lt      Pete Dee MD    Cardiac Electrophysiology  [facility]  3/10/2025  1:45 PM         [1]   Allergies  Allergen Reactions    Radiology Contrast Iodinated Dyes HIVES and RASH    Gadolinium     Kiwi Extract SWELLING

## 2025-03-10 NOTE — H&P
[facility]      Chief Complaint   Patient presents with    Difficulty Breathing        PCP: Bettie Israel DO    History of Present Illness: Patient is a 80 year old female with medical history of stage II breast cancer s/p lumpectomy and RT, HTN, HLD, CAD s/p CABG, Afib s/p ablation (2015), AVR and AAA repair who presented for dyspnea.  Patient reports mostly orthopnea and exertional dyspnea worsening over the last few days.  Notes she has been sleeping in a bedside recliner.  No peripheral edema.  No chest pain or palpitations.    In the ER, vitals stable on room air. Labs with chronic anemia, mild hypokalemia, INR 2.4 and markedly elevated proBNP. Initial trop 17. CXR with cardiology. Admitted for further evaluation.     Medical History:  Past Medical History:    Arrhythmia    ATRIAL FIBRILLATION    Dr. 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     Cancer (HCC)    CORONARY ARTERY DISEASE    Endocrine disorder    Gout    History of blood transfusion    Hypertension    Lymphedema of upper extremity following lymphadenectomy    Migraines    Osteopenia    Other and unspecified hyperlipidemia    Unspecified essential hypertension    Visual impairment      Past Surgical History:   Procedure Laterality Date    Angiogram      Appendectomy      Appendectomy      Cabg  2004    Hysterectomy  1997    MIKE/BSO- fibroid uterus    Lumpectomy left  01/2013    invasive ductal    Yue localization wire 1 site left (cpt=19281)  1998;1996    Needle biopsy left  2012    us core bx-papilloma    Needle biopsy right  02/2013    mri bx-sclerosing adenosis    Oophorectomy  1997    Other  2015    thoracic aortic aneurysm repair    Other accessory      cardiac ablation/ failed per pt    Other surgical history  2004    Surgery for aortic dissection- aortic root repair, aortic valve replacement, single CABG    Other surgical history        left breast biopsy in  and     Other surgical history  2013    re-excision left lumpectomy    Radiation left  2013    Spine surgery procedure unlisted        Social History     Tobacco Use    Smoking status: Former     Current packs/day: 0.00     Types: Cigarettes     Quit date: 1988     Years since quittin.1    Smokeless tobacco: Never   Substance Use Topics    Alcohol use: Yes     Alcohol/week: 1.0 standard drink of alcohol     Types: 1 Glasses of wine per week     Comment: 1 glass of wine per week      Family History   Problem Relation Age of Onset    Heart Disorder Father     Diabetes Mother     Breast Cancer Self 68       Allergies[1]     Review of Systems  Comprehensive ROS reviewed and negative except for what is stated in HPI.      OBJECTIVE:  /65 (BP Location: Right arm)   Pulse 68   Temp 98 °F (36.7 °C) (Oral)   Resp 18   Ht 5' 4\" (1.626 m)   Wt 114 lb 6.7 oz (51.9 kg)   SpO2 95%   BMI 19.64 kg/m²   General: No apparent distress.  Alert and oriented.  HEENT:  EOMI, PERRLA.  Pulm: Clear breath sounds bilaterally.  Normal respiratory effort.  CV: Regular rate and rhythm.  Abd: Soft, nontender, nondistended. Bowel sounds present.  MSK: No peripheral edema or obvious deformities.  Skin: No lesions or rashes.  Neuro: No obvious focal deficits.    Data Review:    LABS:   Lab Results   Component Value Date    WBC 4.7 03/10/2025    HGB 11.4 03/10/2025    HCT 35.2 03/10/2025    .0 03/10/2025    CREATSERUM 0.83 03/10/2025    BUN 19 03/10/2025     03/10/2025    K 3.2 03/10/2025     03/10/2025    CO2 25.0 03/10/2025    GLU 93 03/10/2025    CA 9.4 03/10/2025    ALB 4.2 03/10/2025    ALKPHO 96 03/10/2025    BILT 1.4 03/10/2025    TP 6.9 03/10/2025    AST 40 03/10/2025    ALT 42 03/10/2025    INR 2.40 03/10/2025    PTP 26.4 03/10/2025    MG 1.8 03/10/2025       All lab work and imaging personally reviewed.    Assessment/Plan:     Assessment/ Plan:     #Acute  on chronic HFrEF exacerbation   -IV Lasix 40 mg BID. Reasess.  -strict I/O and daily weights, diet restriction  -repeat echo- reviewed previous  LVEF 25-30%  -remains on room air, will do ambulatory pulse ox.  -appreciate cardiology input    #Afib  #CAD s/p CABG  #HTN  #HLD  -management as above, cont home meds  -tele    #Hypercoagulable state  -daily INR, coumadin dosing per pharmacy    #Stage II breast ca s/p lumpectomy  #Osteoporosis  #Constipation 2/2 narcotics    -onc notes reviewed  -anastrozole dc'd 2/24 due to compression fracture  -monitor labs     DVT ppx: coumadin   Diet: cardiac  Disposition: inpt     D/w patient and her  at bedside     Outpatient records or previous hospital records reviewed.   [facility] hospitalist to continue to follow patient while in house.    Isacc Adams DO  [facility] Hospitalist         [1]   Allergies  Allergen Reactions    Radiology Contrast Iodinated Dyes HIVES and RASH    Gadolinium     Kiwi Extract SWELLING

## 2025-03-10 NOTE — CONSULTS
[facility]   part of [facility]    Report of Consultation    Angeles Anand Patient Status:  Observation    3/6/1945 MRN [id number]   Location [facility] 2NE-A Attending Isacc Adams,    Hosp Day # 0 PCP Bettie Israel DO     Date of Admission:  3/9/2025  Date of Consult:  3/10/25    Reason for Consultation:  Difficulty breathing    History of Present Illness:  Angeles Anand is a a(n) 80 year old female. Complex history with single vessel bypass, SAVR and ascending aortic aneurysm repair (remote). Symptomatic atrial arrhythmia with prior ablation (RF PVI and atrial flutter ablation). Increasing DALL[place] as of late with a decline in ventricular function. Admitted overnight with acute HFrEF.    Pro-bnp  11,863    ECG sinus with extremely wide cardiomyopathic appearing QRS    Poor appetite for several weeks        History:  Past Medical History:    Arrhythmia    ATRIAL FIBRILLATION    Dr. 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     Cancer (HCC)    CORONARY ARTERY DISEASE    Endocrine disorder    Gout    History of blood transfusion    Hypertension    Lymphedema of upper extremity following lymphadenectomy    Migraines    Osteopenia    Other and unspecified hyperlipidemia    Unspecified essential hypertension    Visual impairment     Past Surgical History:   Procedure Laterality Date    Angiogram      Appendectomy      Appendectomy      Cabg  2004    Hysterectomy      MIKE/BSO- fibroid uterus    Lumpectomy left  2013    invasive ductal    Yue localization wire 1 site left (cpt=19281)  1998;1996    Needle biopsy left      us core bx-papilloma    Needle biopsy right  2013    mri bx-sclerosing adenosis    Oophorectomy  1997    Other  2015    thoracic aortic aneurysm repair    Other accessory      cardiac ablation/ failed per pt    Other surgical history  2004    Surgery for aortic dissection- aortic root repair, aortic valve  replacement, single CABG    Other surgical history       left breast biopsy in  and     Other surgical history  2013    re-excision left lumpectomy    Radiation left  2013    Spine surgery procedure unlisted       Family History   Problem Relation Age of Onset    Heart Disorder Father     Diabetes Mother     Breast Cancer Self 67      reports that she quit smoking about 37 years ago. Her smoking use included cigarettes. She has never used smokeless tobacco. She reports current alcohol use of about 1.0 standard drink of alcohol per week. She reports that she does not use drugs.    Allergies:  Allergies[1]    Medications:    Current Facility-Administered Medications:     furosemide (Lasix) 10 mg/mL injection 40 mg, 40 mg, Intravenous, BID (Diuretic)    acetaminophen (Tylenol Extra Strength) tab 1,000 mg, 1,000 mg, Oral, Q6H PRN    melatonin tab 3 mg, 3 mg, Oral, Nightly PRN    polyethylene glycol (PEG 3350) (Miralax) 17 g oral packet 17 g, 17 g, Oral, Daily PRN    sennosides (Senokot) tab 17.2 mg, 17.2 mg, Oral, Nightly PRN    bisacodyl (Dulcolax) 10 MG rectal suppository 10 mg, 10 mg, Rectal, Daily PRN    calcium carbonate (Tums) chewable tab 1,000 mg, 1,000 mg, Oral, TID PRN    metoprolol tartrate (Lopressor) tab 25 mg, 25 mg, Oral, Daily Beta Blocker    amiodarone (Pacerone) tab 200 mg, 200 mg, Oral, Daily    atorvastatin (Lipitor) tab 20 mg, 20 mg, Oral, Nightly    warfarin (Coumadin) tab 2 mg, 2 mg, Oral, Once at night    Review of Systems:  All systems were reviewed and are negative except as described above in HPI.    Physical Exam:  Blood pressure 121/65, pulse 68, temperature 98 °F (36.7 °C), temperature source Oral, resp. rate 18, height 162.6 cm (5' 4\"), weight 114 lb 6.7 oz (51.9 kg), SpO2 95%, not currently breastfeeding.  Temp (24hrs), Av.9 °F (36.6 °C), Min:97.6 °F (36.4 °C), Max:98.2 °F (36.8 °C)    Wt Readings from Last 3 Encounters:   03/10/25 114 lb 6.7 oz (51.9 kg)   01/15/25  118 lb (53.5 kg)   05/13/24 115 lb (52.2 kg)       Telemetry: sinus  General: Alert and oriented in no apparent distress.  HEENT: No focal deficits.  Neck: No JVD  Cardiac: Regular rate and rhythm, PANCHITO  Lungs: Clear without wheezes, rales, rhonchi or dullness.  Normal excursions and effort.  Abdomen: Soft, non-tender.   Extremities: trace edema  Neurologic: Alert and oriented, normal affect.  Skin: Warm and dry.     Laboratories and Data:  Diagnostics:  EKG: as above  Echo:   Stress Test:   Cath:   CTA Chest:   CXR: cardiomegaly - chronic scarring    Labs:   Lab Results   Component Value Date    WBC 4.7 03/10/2025    RBC 4.36 03/10/2025    HGB 11.4 03/10/2025    HCT 35.2 03/10/2025    MCV 80.7 03/10/2025    MCH 26.1 03/10/2025    MCHC 32.4 03/10/2025    RDW 15.1 03/10/2025    .0 03/10/2025     Lab Results   Component Value Date    PT 21.9 (H) 08/21/2013    PT 20.5 (H) 08/20/2013    PT 23.9 (H) 08/19/2013    INR 2.40 (H) 03/10/2025    INR 2.71 (H) 03/09/2025    INR 2.70 (H) 03/05/2025       Assessment/Plan:  Patient Active Problem List   Diagnosis    L Breast cancer    Hypertension    Paroxysmal atrial fibrillation (HCC)    Osteopenia    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 Dee    Reviewed in detail with patient and her .    Ritesh Guerra MD  3/10/2025  10:26 AM        C5       [1]   Allergies  Allergen Reactions    Radiology  Contrast Iodinated Dyes HIVES and RASH    Gadolinium     Kiwi Extract SWELLING

## 2025-03-11 LAB
ANION GAP SERPL CALC-SCNC: 6 MMOL/L (ref 0–18)
BUN BLD-MCNC: 19 MG/DL (ref 9–23)
CALCIUM BLD-MCNC: 9.8 MG/DL (ref 8.7–10.6)
CHLORIDE SERPL-SCNC: 105 MMOL/L (ref 98–112)
CO2 SERPL-SCNC: 30 MMOL/L (ref 21–32)
CREAT BLD-MCNC: 0.84 MG/DL
DEPRECATED HBV CORE AB SER IA-ACNC: 18 NG/ML
EGFRCR SERPLBLD CKD-EPI 2021: 71 ML/MIN/1.73M2 (ref 60–?)
ERYTHROCYTE [DISTWIDTH] IN BLOOD BY AUTOMATED COUNT: 15.1 %
GLUCOSE BLD-MCNC: 103 MG/DL (ref 70–99)
HCT VFR BLD AUTO: 39.6 %
HGB BLD-MCNC: 12.8 G/DL
INR BLD: 2.41 (ref 0.8–1.2)
MAGNESIUM SERPL-MCNC: 1.8 MG/DL (ref 1.6–2.6)
MCH RBC QN AUTO: 26.1 PG (ref 26–34)
MCHC RBC AUTO-ENTMCNC: 32.3 G/DL (ref 31–37)
MCV RBC AUTO: 80.7 FL
NT-PROBNP SERPL-MCNC: 6124 PG/ML (ref ?–450)
OSMOLALITY SERPL CALC.SUM OF ELEC: 295 MOSM/KG (ref 275–295)
PLATELET # BLD AUTO: 133 10(3)UL (ref 150–450)
PLATELETS.RETICULATED NFR BLD AUTO: 4.7 % (ref 0–7)
POTASSIUM SERPL-SCNC: 3.9 MMOL/L (ref 3.5–5.1)
POTASSIUM SERPL-SCNC: 3.9 MMOL/L (ref 3.5–5.1)
PROTHROMBIN TIME: 26.4 SECONDS (ref 11.6–14.8)
RBC # BLD AUTO: 4.91 X10(6)UL
SODIUM SERPL-SCNC: 141 MMOL/L (ref 136–145)
WBC # BLD AUTO: 5.6 X10(3) UL (ref 4–11)

## 2025-03-11 PROCEDURE — 85610 PROTHROMBIN TIME: CPT | Performed by: STUDENT IN AN ORGANIZED HEALTH CARE EDUCATION/TRAINING PROGRAM

## 2025-03-11 PROCEDURE — 80048 BASIC METABOLIC PNL TOTAL CA: CPT | Performed by: STUDENT IN AN ORGANIZED HEALTH CARE EDUCATION/TRAINING PROGRAM

## 2025-03-11 PROCEDURE — 82728 ASSAY OF FERRITIN: CPT | Performed by: NURSE PRACTITIONER

## 2025-03-11 PROCEDURE — 83735 ASSAY OF MAGNESIUM: CPT | Performed by: STUDENT IN AN ORGANIZED HEALTH CARE EDUCATION/TRAINING PROGRAM

## 2025-03-11 PROCEDURE — 83880 ASSAY OF NATRIURETIC PEPTIDE: CPT | Performed by: INTERNAL MEDICINE

## 2025-03-11 PROCEDURE — 84132 ASSAY OF SERUM POTASSIUM: CPT | Performed by: STUDENT IN AN ORGANIZED HEALTH CARE EDUCATION/TRAINING PROGRAM

## 2025-03-11 PROCEDURE — 85027 COMPLETE CBC AUTOMATED: CPT | Performed by: STUDENT IN AN ORGANIZED HEALTH CARE EDUCATION/TRAINING PROGRAM

## 2025-03-11 RX ORDER — AMIODARONE HYDROCHLORIDE 200 MG/1
200 TABLET ORAL DAILY
Status: SHIPPED | COMMUNITY
Start: 2025-03-11

## 2025-03-11 RX ORDER — MAGNESIUM OXIDE 400 MG/1
400 TABLET ORAL ONCE
Status: COMPLETED | OUTPATIENT
Start: 2025-03-11 | End: 2025-03-11

## 2025-03-11 RX ORDER — TORSEMIDE 10 MG/1
10 TABLET ORAL DAILY
Qty: 30 TABLET | Refills: 2 | Status: SHIPPED | OUTPATIENT
Start: 2025-03-11

## 2025-03-11 RX ORDER — WARFARIN SODIUM 2.5 MG/1
2.5 TABLET ORAL
Status: DISCONTINUED | OUTPATIENT
Start: 2025-03-11 | End: 2025-03-11

## 2025-03-11 NOTE — PLAN OF CARE
Assumed care of patient around 0730.Patient AXOX4.On room air.NSR on tele.No c/o pain.IV diuretics given,I/O,DW.Plan of care updated.Call light within reach.Spouse at bedside.  Problem: CARDIOVASCULAR - ADULT  Goal: Maintains optimal cardiac output and hemodynamic stability  Description: INTERVENTIONS:  - Monitor vital signs, rhythm, and trends  - Monitor for bleeding, hypotension and signs of decreased cardiac output  - Evaluate effectiveness of vasoactive medications to optimize hemodynamic stability  - Monitor arterial and/or venous puncture sites for bleeding and/or hematoma  - Assess quality of pulses, skin color and temperature  - Assess for signs of decreased coronary artery perfusion - ex. Angina  - Evaluate fluid balance, assess for edema, trend weights  Outcome: Progressing  Goal: Absence of cardiac arrhythmias or at baseline  Description: INTERVENTIONS:  - Continuous cardiac monitoring, monitor vital signs, obtain 12 lead EKG if indicated  - Evaluate effectiveness of antiarrhythmic and heart rate control medications as ordered  - Initiate emergency measures for life threatening arrhythmias  - Monitor electrolytes and administer replacement therapy as ordered  Outcome: Progressing

## 2025-03-11 NOTE — PROGRESS NOTES
[facility] Dosing Service  Warfarin (Coumadin) Subsequent Dosing    Angeles Anand is a 80 year old patient for whom pharmacy is dosing warfarin (Coumadin). Goal INR is 2-3    Recent Labs   Lab 03/05/25  1327 03/05/25  1348 03/09/25  2219 03/10/25  0639 03/11/25  0947   INR 2.60* 2.70* 2.71* 2.40* 2.41*       Consulted by:  Dr. Stewart  Indication:  afib  Potential Drug Interactions:  Amiodarone  Other Anticoagulants:  none  Home regimen (if applicable):  2.5 mg Tu, Th, Sun and 1.25 mg ROW    Inpatient Dosing History:    Date 3/10 3/11       INR 2.4 2.41       Coumadin dose 2 mg 2.5 mg                Based on above -  1.  For today, Give warfarin (COUMADIN) 2.5 mg at 2100 tonight  2   PT/INR ordered daily while on warfarin  3.  Pharmacy will continue to follow.  We appreciate the opportunity to assist in the care of this patient.    Henrietta Wylie, PharmD  3/11/2025  10:28 AM

## 2025-03-11 NOTE — PLAN OF CARE
Patients spouse, Deep notified via telephone call of the appointment with Dr. Dee for this Thursday, March 13th at 12:50. Instructed to call [facility] directly with any questions or issues.     Scarlett MONTOYA, RN, PCCN  HF  [phone]

## 2025-03-11 NOTE — CONSULTS
Heart Failure Nurse  Progress Note    Patient was evaluated by the Heart Failure Nurse  for understanding, verbalization, demonstration, and recall of education related to heart failure, overall adherence to the behaviors necessary to maintain a compensated status, and risk for readmission.     Patient assessment: Patient is alert, oriented. Up in chair. Family at bedside. HF education reviewed with patient. Patient had poor appetite and so weight was not going up - doing daily weights at home. Reviewed medications. Poor mobility due to a pre-existing hip injury and fall that led to back injury. Patient aware she should follow up with Dr. Dee. Awaiting appt from [facility].     Patient is able to verbalize signs/symptoms fluid overload/impending HF exacerbation and who to contact with problems                                          _x__ yes  ___ no      Patient is following a 2000 mg sodium diet                                             _x__ yes  ___ no    If no, barriers to 2000 mg sodium diet:_______________________________________________    Patient informed of 2-Part dietician classes that is free if sign up within 30 days of discharge or $40  _x__ yes  ___ no      Patient is adherent to medication regimen                                              _x__ yes  ___ no    If no, barriers to medication regimen:    Patient has sufficient funds to purchase medication                      _x__ yes  ___ no      Patient has a scale in the home              _x__ yes  ___ no      Patient is adherent to daily weight monitoring                                        _x__ yes   ___ no    If no, barriers to daily weight monitoring:    Symptom Tracker Worksheet reviewed with patient  _x__ yes   ___ no      Patient verbalizes understanding of stoplight/heart failure zones          _x__ yes   ___ no      Patient understands the importance of 7-day follow-up appointment      _x__ yes  ___ no    Appointment Date:  requested           Patient has adequate transportation to attend follow-up appointments    x___ yes  ___ no    If no, was referral to Social Work made  ___yes  _x__ no      Family/Friend present during education: spouse    Additional consultations required: Cardiac rehab    Scarlett DAUGHERTYN, RN, PCCN  HF  [id number]

## 2025-03-11 NOTE — PLAN OF CARE
Pt is A&O x4. Denies pain, SOB & cardiac symptoms.  Maintaining O2 on RA. NSR w/ 1st AVB & RBBB.  Bowel sounds active. Continent of bowel and bladder.  Pt updated on plan of care. IV lasix BID. DW. I&Os.  Bed in lowest position. Bed alarm on. Call light within reach.     Problem: CARDIOVASCULAR - ADULT  Goal: Maintains optimal cardiac output and hemodynamic stability  Description: INTERVENTIONS:  - Monitor vital signs, rhythm, and trends  - Monitor for bleeding, hypotension and signs of decreased cardiac output  - Evaluate effectiveness of vasoactive medications to optimize hemodynamic stability  - Monitor arterial and/or venous puncture sites for bleeding and/or hematoma  - Assess quality of pulses, skin color and temperature  - Assess for signs of decreased coronary artery perfusion - ex. Angina  - Evaluate fluid balance, assess for edema, trend weights  Outcome: Progressing  Goal: Absence of cardiac arrhythmias or at baseline  Description: INTERVENTIONS:  - Continuous cardiac monitoring, monitor vital signs, obtain 12 lead EKG if indicated  - Evaluate effectiveness of antiarrhythmic and heart rate control medications as ordered  - Initiate emergency measures for life threatening arrhythmias  - Monitor electrolytes and administer replacement therapy as ordered  Outcome: Progressing

## 2025-03-11 NOTE — DISCHARGE SUMMARY
[facility] Hospitalist Discharge Summary     Patient ID:  Angeles Anand  80 year old  3/6/1945    Admit date: 3/9/2025  Discharge date and time:  3/11/25  Attending Physician: Isacc Adams DO   Primary Care Physician: Bettie Israel DO   Discharge Diagnoses: SOB (shortness of breath) on exertion [R06.02]    Discharge Condition: Stable      Disposition:  Home    Follow up:   -Cardiology as advised    Hospital Course:   Patient is a 80 year old female with medical history of stage II breast cancer s/p lumpectomy and RT, HTN, HLD, CAD s/p CABG, Afib s/p ablation (2015), AVR and AAA repair who presented for dyspnea.  Patient reports mostly orthopnea and exertional dyspnea worsening over the last few days.  Notes she has been sleeping in a bedside recliner.  No peripheral edema.  No chest pain or palpitations.     In the ER, vitals stable on room air. Labs with chronic anemia, mild hypokalemia, INR 2.4 and markedly elevated proBNP. Initial trop 17. CXR with cardiology. Admitted for further evaluation.    #Acute on chronic HFrEF exacerbation   -Compensating after IV Lasix yesterday.  -Dyspnea improved.  -Echo reviewed.  -Cleared by cardiology, to resume torsemide 10 mg as advised.  Follow-up with cardiology as instructed.  Patient denies acute chest pain or worsening dyspnea.     #Afib  #CAD s/p CABG  #HTN  #HLD  -management as above, cont home meds  -tele     #Hypercoagulable state  -daily INR, coumadin dosing per pharmacy     #Stage II breast ca s/p lumpectomy  #Osteoporosis  #Constipation 2/2 narcotics    -onc notes reviewed  -anastrozole dc'd 2/24 due to compression fracture  -monitor labs      Cleared for discharge  Discussed discharge medications and follow-up instructions at patient bedside. Already has torsemide pills at home.    Exam on Day of DC:  /61 (BP Location: Right arm)   Pulse 67   Temp 97.9 °F (36.6 °C) (Oral)   Resp 16   Ht 5' 4\" (1.626 m)   Wt 108 lb 14.5 oz (49.4 kg)   SpO2 96%   BMI 18.69  kg/m²   General: No apparent distress.  Alert and oriented.  HEENT:  EOMI, PERRLA.  Pulm: Clear breath sounds bilaterally.  Normal respiratory effort.  CV: Regular rate and rhythm.  Abd: Soft, nontender, nondistended. Bowel sounds present.  MSK: No peripheral edema or obvious deformities.  Skin: No lesions or rashes.  Neuro: No obvious focal deficits.    I as the attending physician reconciled the current and discharge medications on day of discharge.     Current Discharge Medication List        CONTINUE these medications which have NOT CHANGED    Details   !! warfarin 1 MG Oral Tab Take 1.5 tablets (1.5 mg total) by mouth As Directed. 2.5mg on Sunday, Tuesday, Thursday  1.5mg on Saturday, Monday, Wednesday, Friday      metoprolol tartrate 25 MG Oral Tab Take 1 tablet (25 mg total) by mouth daily.      amiodarone 200 MG Oral Tab Take 1/2 tablet daily (100mg daily)      atorvastatin 20 MG Oral Tab Take 1 tablet (20 mg total) by mouth nightly.      cyanocobalamin 500 MCG Oral Tab Take 1 tablet (500 mcg total) by mouth every other day. Every other day      Melatonin 5 MG Oral Tab Take 1 tablet (5 mg total) by mouth nightly as needed (sleep).      acetaminophen 325 MG Oral Tab Take 2 tablets (650 mg total) by mouth every 6 (six) hours as needed for Pain. Pt states they take tylenol BID PRN      eplerenone 25 MG Oral Tab Take 1 tablet (25 mg total) by mouth daily.      !! warfarin 2.5 MG Oral Tab Take 1 tablet (2.5 mg total) by mouth As Directed. 2.5mg on Sunday, Tuesday, Thursday  1.25mg on Saturday, Monday, Wednesday, Friday  Per patient      Calcium-Vitamin D (CALTRATE 600 PLUS-VIT D OR) Take 600 mg by mouth 2 (two) times daily.         !! - Potential duplicate medications found. Please discuss with provider.          Activity: activity as tolerated  Diet: cardiac diet  Wound Care: as directed  Code Status: Prior    Total time coordinating care for discharge: Greater than 32 minutes    Isacckhoa Adams DO  [facility]  and Care Hospitalist

## 2025-03-11 NOTE — PROGRESS NOTES
Feels better.- ate breakfast - no nausea    Tele sinus    Afebrile  121/72  75 regular    Lungs clear  Ht RRR - PANCHITO  Abd soft  Ext no significant edema  Neuro intact    Echo with severe LV dysfunction - moderate-severe MR    19/0.8     Hgb 12.8      A/P: Compensated HFrEF.    May discharge home - same meds as pre-admission except add Torsemide 10 mg daily (they have at home already)    Follow up with me is already scheduled for late April    We will arrange for follow up with Dr. Dee in the interim to plan CRT therapy    Reviewed with patient and her  in detail          L3

## 2025-03-11 NOTE — PLAN OF CARE
NURSING DISCHARGE NOTE    Discharged Home via Wheelchair.  Accompanied by Spouse  Belongings Taken by patient/family.  Patient discharged home today.All discharge instructions reviewed with the patient.All follow up appointment reviewed.All questions answered.Patient and spouse verbalized understanding.Peripheral IV off.Tele off.

## 2025-03-11 NOTE — CARDIAC REHAB
Spoke with pt and her . Informed them her low EF qualifies her for phase 2 CR. However, pt's  states pt unable to do program as pt needs hip replacement and had fall causing compression fracture of T8.

## 2025-03-13 VITALS
DIASTOLIC BLOOD PRESSURE: 72 MMHG | SYSTOLIC BLOOD PRESSURE: 115 MMHG | OXYGEN SATURATION: 96 % | WEIGHT: 109 LBS | HEIGHT: 64 IN | HEART RATE: 69 BPM | BODY MASS INDEX: 18.61 KG/M2 | TEMPERATURE: 98 F | RESPIRATION RATE: 16 BRPM

## 2025-03-18 ENCOUNTER — HOSPITAL ENCOUNTER (OUTPATIENT)
Dept: LAB | Facility: HOSPITAL | Age: 80
Discharge: HOME | End: 2025-03-18
Attending: INTERNAL MEDICINE
Payer: MEDICARE

## 2025-03-18 DIAGNOSIS — I48.0 PAROXYSMAL ATRIAL FIBRILLATION (HCC): ICD-10-CM

## 2025-03-18 DIAGNOSIS — Z51.81 ADMISSION FOR LONG-TERM (CURRENT) USE OF ANTICOAGULANTS: ICD-10-CM

## 2025-03-18 DIAGNOSIS — Z79.01 ADMISSION FOR LONG-TERM (CURRENT) USE OF ANTICOAGULANTS: ICD-10-CM

## 2025-03-18 LAB — INR BLDC: 3.1 (ref 0.9–1.1)

## 2025-03-18 PROCEDURE — 85610 PROTHROMBIN TIME: CPT

## 2025-03-24 NOTE — PAT NURSING NOTE
PAT call with . The following instructions were given. They do not use My Chart. Questions were answered and he verbalized understanding.       You are scheduled for a Cardiac Procedure   3/31/2025   Do not eat or drink anything after midnight including gum, mints, candy, etc.   Medications you are allowed to take can be taken with a sip of water the morning of your procedure   Last dose of Warfarin is to be 3/25/25   Hold herbal supplements and vitamins   Hold Torsemide the morning of the procedure.   Shower with antibacterial soap using a clean washcloth, prior to procedure. Once dried off, no lotions/powders/creams/ointments, etc.   The day prior to your procedure you will receive a phone call before 6:00 pm with your arrival time. If you haven't received a phone call, please check your voicemail messages.; If you did not receive a voice mail and it is after 6:00 pm, please call the nursing supervisor at [phone].   Sedation will be given so you WILL NOT be able to drive home. You will need a responsible adult  to drive you home. You can NOT take uber or taxi unless approved by your physician in advance.   Your nurse will give you specific instructions before discharge; Any questions, please call the physician's office

## 2025-03-25 ENCOUNTER — HOSPITAL ENCOUNTER (OUTPATIENT)
Dept: LAB | Facility: HOSPITAL | Age: 80
Discharge: HOME | End: 2025-03-25
Attending: INTERNAL MEDICINE
Payer: MEDICARE

## 2025-03-25 DIAGNOSIS — Z79.01 ADMISSION FOR LONG-TERM (CURRENT) USE OF ANTICOAGULANTS: ICD-10-CM

## 2025-03-25 DIAGNOSIS — I48.0 PAROXYSMAL ATRIAL FIBRILLATION (HCC): ICD-10-CM

## 2025-03-25 DIAGNOSIS — Z51.81 ADMISSION FOR LONG-TERM (CURRENT) USE OF ANTICOAGULANTS: ICD-10-CM

## 2025-03-25 LAB — INR BLDC: 2.4 (ref 0.9–1.1)

## 2025-03-25 PROCEDURE — 85610 PROTHROMBIN TIME: CPT

## 2025-03-25 RX ORDER — POTASSIUM CHLORIDE 1500 MG/1
1 TABLET, EXTENDED RELEASE ORAL DAILY
COMMUNITY

## 2025-03-31 ENCOUNTER — APPOINTMENT (OUTPATIENT)
Dept: GENERAL RADIOLOGY | Facility: HOSPITAL | Age: 80
End: 2025-03-31
Attending: INTERNAL MEDICINE
Payer: MEDICARE

## 2025-03-31 ENCOUNTER — HOSPITAL ENCOUNTER (OUTPATIENT)
Dept: INTERVENTIONAL RADIOLOGY/VASCULAR | Facility: HOSPITAL | Age: 80
Discharge: HOME | End: 2025-03-31
Attending: INTERNAL MEDICINE | Admitting: INTERNAL MEDICINE
Payer: MEDICARE

## 2025-03-31 VITALS
HEART RATE: 60 BPM | SYSTOLIC BLOOD PRESSURE: 101 MMHG | BODY MASS INDEX: 19.2 KG/M2 | RESPIRATION RATE: 18 BRPM | DIASTOLIC BLOOD PRESSURE: 51 MMHG | OXYGEN SATURATION: 96 % | TEMPERATURE: 98 F | HEIGHT: 62.5 IN | WEIGHT: 107 LBS

## 2025-03-31 DIAGNOSIS — I50.20 HFREF (HEART FAILURE WITH REDUCED EJECTION FRACTION) (HCC): ICD-10-CM

## 2025-03-31 LAB — INR: 1.4 (ref 0.8–1.3)

## 2025-03-31 PROCEDURE — 02HK3KZ INSERTION OF DEFIBRILLATOR LEAD INTO RIGHT VENTRICLE, PERCUTANEOUS APPROACH: ICD-10-PCS | Performed by: INTERNAL MEDICINE

## 2025-03-31 PROCEDURE — 71046 X-RAY EXAM CHEST 2 VIEWS: CPT | Performed by: INTERNAL MEDICINE

## 2025-03-31 PROCEDURE — 33225 L VENTRIC PACING LEAD ADD-ON: CPT | Performed by: INTERNAL MEDICINE

## 2025-03-31 PROCEDURE — 02HL3KZ INSERTION OF DEFIBRILLATOR LEAD INTO LEFT VENTRICLE, PERCUTANEOUS APPROACH: ICD-10-PCS | Performed by: INTERNAL MEDICINE

## 2025-03-31 PROCEDURE — 0JH609Z INSERTION OF CARDIAC RESYNCHRONIZATION DEFIBRILLATOR PULSE GENERATOR INTO CHEST SUBCUTANEOUS TISSUE AND FASCIA, OPEN APPROACH: ICD-10-PCS | Performed by: INTERNAL MEDICINE

## 2025-03-31 PROCEDURE — 02H63KZ INSERTION OF DEFIBRILLATOR LEAD INTO RIGHT ATRIUM, PERCUTANEOUS APPROACH: ICD-10-PCS | Performed by: INTERNAL MEDICINE

## 2025-03-31 PROCEDURE — 33249 INSJ/RPLCMT DEFIB W/LEAD(S): CPT | Performed by: INTERNAL MEDICINE

## 2025-03-31 PROCEDURE — 99153 MOD SED SAME PHYS/QHP EA: CPT | Performed by: INTERNAL MEDICINE

## 2025-03-31 PROCEDURE — 99152 MOD SED SAME PHYS/QHP 5/>YRS: CPT | Performed by: INTERNAL MEDICINE

## 2025-03-31 PROCEDURE — 85610 PROTHROMBIN TIME: CPT | Performed by: INTERNAL MEDICINE

## 2025-03-31 RX ORDER — LIDOCAINE HYDROCHLORIDE 10 MG/ML
INJECTION, SOLUTION EPIDURAL; INFILTRATION; INTRACAUDAL; PERINEURAL
Status: COMPLETED
Start: 2025-03-31 | End: 2025-03-31

## 2025-03-31 RX ORDER — SODIUM CHLORIDE 9 MG/ML
INJECTION, SOLUTION INTRAVENOUS
Status: DISCONTINUED | OUTPATIENT
Start: 2025-04-01 | End: 2025-03-31 | Stop reason: HOSPADM

## 2025-03-31 RX ORDER — HEPARIN SODIUM 1000 [USP'U]/ML
INJECTION, SOLUTION INTRAVENOUS; SUBCUTANEOUS
Status: COMPLETED
Start: 2025-03-31 | End: 2025-03-31

## 2025-03-31 RX ORDER — CHLORHEXIDINE GLUCONATE 40 MG/ML
SOLUTION TOPICAL
Status: DISCONTINUED | OUTPATIENT
Start: 2025-03-31 | End: 2025-03-31 | Stop reason: HOSPADM

## 2025-03-31 RX ORDER — VANCOMYCIN HYDROCHLORIDE 1 G/20ML
INJECTION, POWDER, LYOPHILIZED, FOR SOLUTION INTRAVENOUS
Status: COMPLETED
Start: 2025-03-31 | End: 2025-03-31

## 2025-03-31 RX ORDER — MIDAZOLAM HYDROCHLORIDE 1 MG/ML
INJECTION INTRAMUSCULAR; INTRAVENOUS
Status: COMPLETED
Start: 2025-03-31 | End: 2025-03-31

## 2025-03-31 NOTE — H&P
[place]  Pre Procedure History and Physical    Angeles Anand Patient Status:  Outpatient    3/6/1945 MRN [id number]   Location [facility] INTERVENTIONAL SUITES Attending Pete Dee MD   Hosp Day # 0 PCP Bettie Israel DO     Consults      History of Present Illness:  Angeles Anand is a a(n) 80 year old female here for CRT-D      Prior H/P Reviewed with no changes    History:  Past Medical History:    Arrhythmia    ATRIAL FIBRILLATION    Dr. 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     Cancer (HCC)    CORONARY ARTERY DISEASE    Endocrine disorder    Gout    History of blood transfusion    Hypertension    Lymphedema of upper extremity following lymphadenectomy    Migraines    Osteopenia    Other and unspecified hyperlipidemia    Unspecified essential hypertension    Visual impairment     Past Surgical History:   Procedure Laterality Date    Angiogram      Appendectomy      Appendectomy      Cabg  2004    Hysterectomy      MIKE/BSO- fibroid uterus    Lumpectomy left  2013    invasive ductal    Yue localization wire 1 site left (cpt=19281)  ;    Needle biopsy left      us core bx-papilloma    Needle biopsy right  2013    mri bx-sclerosing adenosis    Oophorectomy      Other  2015    thoracic aortic aneurysm repair    Other accessory      cardiac ablation/ failed per pt    Other surgical history  2004    Surgery for aortic dissection- aortic root repair, aortic valve replacement, single CABG    Other surgical history       left breast biopsy in  and     Other surgical history  2013    re-excision left lumpectomy    Radiation left  2013    Spine surgery procedure unlisted       Family History   Problem Relation Age of Onset    Heart Disorder Father     Diabetes Mother     Breast Cancer Self 68      reports that she quit smoking about 37 years ago. Her smoking use included cigarettes. She has  never used smokeless tobacco. She reports current alcohol use of about 1.0 standard drink of alcohol per week. She reports that she does not use drugs.    Allergies:  Allergies[1]    Medications:    Current Facility-Administered Medications:     chlorhexidine (Hibiclens) 4 % external liquid, , Topical, On Call    [START ON 2025] sodium chloride 0.9% infusion, , Intravenous, On Call    OBJECTIVE      Physical Exam:  Physical Exam   Blood pressure 132/69, pulse 55, temperature 97.6 °F (36.4 °C), resp. rate 20, height 5' 2.5\" (1.588 m), weight 107 lb (48.5 kg), SpO2 98%, not currently breastfeeding.  Temp (24hrs), Av.6 °F (36.4 °C), Min:97.6 °F (36.4 °C), Max:97.6 °F (36.4 °C)    Wt Readings from Last 3 Encounters:   25 107 lb (48.5 kg)   25 109 lb (49.4 kg)   01/15/25 118 lb (53.5 kg)       NAD  PERRLA/EOMI  Neck veins not elevated  Carotids- no bruits  CTA bilaterally  Cardiac- RRR  Abdomen- Soft,Nontender, normal BS  Extremities- pulses normal  Edema-   Mood /Affect Congruent  Skin- no lesions          Results:   No results for input(s): \"GLU\", \"BUN\", \"CREATSERUM\", \"GFRAA\", \"GFRNAA\", \"EGFRCR\", \"CA\", \"NA\", \"K\", \"CL\", \"CO2\" in the last 168 hours.  No results for input(s): \"RBC\", \"HGB\", \"HCT\", \"MCV\", \"MCH\", \"MCHC\", \"RDW\", \"NEPRELIM\", \"WBC\", \"PLT\" in the last 168 hours.      [unfilled]  No results for input(s): \"BNP\" in the last 168 hours.  Lab Results   Component Value Date    PT 21.9 (H) 2013    PT 20.5 (H) 2013    PT 23.9 (H) 2013    INR 1.4 (A) 2025    INR 2.40 (H) 2025    INR 3.10 (H) 2025     Lab Results   Component Value Date    TROP 1.060 (HH) 2015    TROP 1.480 (HH) 2015    TROP 2.230 (HH) 2015         No results found.       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 Reverified on the day of the procedure    Procedure Planned: CRT-D    Sedation Plan: Moderate    Discharge Plan: same day      Pete Dee MD  Cardiac Electrophysiology  [facility]  3/31/2025  11:29 AM         [1]   Allergies  Allergen Reactions    Radiology Contrast Iodinated Dyes HIVES and RASH    Gadolinium     Kiwi Extract SWELLING

## 2025-03-31 NOTE — DISCHARGE INSTRUCTIONS
Restart coumadin on Friday          Pacemaker and Defibrillator Discharge Instructions    Activity  Plan to rest tonight and tomorrow.  Avoid drinking alcohol for next 24 hours.  Do not drive after procedure until 1-week device check appointment, unless otherwise instructed by your cardiologist.   Wear sling for next 24 hours, then only at night as needed for 30 days to help assist with arm restrictions while sleeping.     Arm Restrictions:  For 30 days after implant:  do not lift arm with implanted device above your head or behind your back. Arm is to remain below your shoulder and in front of your body.   do not lift more than 10 lbs with affected arm   do not perform repetitive cycling motion with affected arm (swimming, golfing, bowling, tennis, exercise machines, raking, vacuuming, snow shoveling).     Incision Care/Bathing  Keep incision site completely dry for 5 days after surgery. After day 5, you can remove top dressing and shower, letting clean soapy water run over incision area, patting dry.   The white steri-strips placed directly over the incision will gradually fall off over the next few weeks and can be physically removed after 3 weeks. Do not take them off before this time. (If you have a zipline dressing, this will be removed by device nurse or MD in 4 weeks)   Keep procedure site clean and dry, do not apply any ointments, creams, lotions to the incision.   Look at your incision daily to monitor for signs and symptoms of infection (redness, swelling, drainage, foul odor from procedure site)  Do not cover incision with extra dressings or gauze unless otherwise instructed.   Do not submerge incision in water, take whirlpool baths or swim for 30 days or until site is completely healed.     When to notify your physician:   If you have chest pain, shortness of breath or persistent cough  If you experience any signs of fever (temperature >101), chills, infection (redness, swelling, thick yellow  drainage, foul order from procedure site)  New or worsening swelling of arm with implanted device   If an ICD was inserted and you receive a shock and have no symptoms, call [facility] device clinic. If you have symptoms (fainting, near fainting, dizziness, lightheadedness, chest pain, shortness of breath, palpitations), call 911.    [facility] Device Clinic Direct Line: [phone]. Monday-Friday. 8:30AM-4PM.   [facility] Main Office: [phone] Monday- Friday 8AM-5PM   [facility] Main Office: [phone] Monday-Friday 8AM-5PM

## 2025-03-31 NOTE — PROCEDURES
[facility]  Procedure Note    Angeles Anand Patient Status:  Outpatient    3/6/1945 MRN [id number]   [facility] INTERVENTIONAL SUITES Attending Pete Dee MD   Hosp Day # 0 [facility] Bettie Israel DO     OPERATION(S) PERFORMED:   1. Cardiac Resynchronization therapy Defibrillator Implant.  Device - [employer]   2. Chest fluoroscopy.   3. Left upper extremity venography.      : Pete Dee MD  INDICATION: Near Heart Association class II heart failure, ischemic cardiomyopathy, wide QRS  COMPLICATIONS: None      ESTIMATED BLOOD LOSS: Minimal.  SEDATION: IV was maintained by RN. Patient was assessed by myself and the nursing staff, IV sedation (versed 6 mg and fentanyl 150 micrograms) were administered during continuous ECG, pulse oximeter, and non-invasive hemodynamic monitoring. I was present from the time of sedation being started to the end of the procedure (-15 25).          METHODS: The patient was brought to the EP lab in a fasting and nonsedated state after providing informed consent. IV sedation was administered during continuous ECG, pulse oximeter, and noninvasive hemodynamic monitoring. After administering 1% lidocaine for local anesthesia, an incision was made parallel to the left clavicle. The plane of the incision was extended to the prepectoral fascia. A pocket was formed.   Access to the axillary vein was achieved via the extrathoracic approach with 3 separate punctures.The guidewires were advanced to the IVC.     Initially we made a pocket on the left side and it tried to obtain access that was not successful.  Patient has a history of a lymph node dissection from breast cancer and did not want an IV in the left arm initially.  We placed a left arm IV to do a venogram which showed occlusion of the left subclavian.  I also attempted to do an axillary stick as well as a cephalic cutdown.  We washed this pocket and then closed it in perform the procedure  from the right infraclavicular area    A ventricular lead was positioned in the RV apex.  Local electrograms and pacing thresholds were measured. Pacing was performed at 10 v to rule out phrenic nerve stimulation.      An atrial lead was positioned in the RA appendage. Local electrograms and pacing thresholds were measured. Pacing was performed at 10 v to rule out phrenic nerve stimulation.     Coronary sinus access was obtained with inner and outer guide. Venography showed no dissection and 1 posterolateral branches. The lead was placed in a posterior lateral branch. Pacing and high output pacing was perfromed to check for phrenic and diaphragmatic stimulation.    The leads were tied to the pre-pectoral fascia with 2-0 Ethibond.    The pocket was irrigated with antibiotic solution. Bleeding was sought for until hemostasis was achieved. The leads were connected to a pulse generator. They were coiled and placed into the pocket along with the pulse generator. The incision was closed in 3 layers using 2-0 and 3-0 Vicryl and a 4-0 subcuticular.   Steri-Strips were applied followed by a sterile dressing. The left arm was placed in a sling and the patient was transported to telemetry in stable condition. There were no apparent intraoperative complications.            CONCLUSIONS:   1. Status post successful implant of a Hernandez CRT-D   2. Adequate RA, RV and LV pacing and sensing thresholds.   3.  Resume warfarin on Friday  4.  PA and lateral chest x-ray prior to discharge       KALYAN Zambrano  Cardiac Electrophysiology  [facility]  3/31/2025  4:10 PM

## 2025-04-07 ENCOUNTER — HOSPITAL ENCOUNTER (OUTPATIENT)
Dept: LAB | Facility: HOSPITAL | Age: 80
Discharge: HOME | End: 2025-04-07
Attending: INTERNAL MEDICINE
Payer: MEDICARE

## 2025-04-07 DIAGNOSIS — Z79.01 ADMISSION FOR LONG-TERM (CURRENT) USE OF ANTICOAGULANTS: ICD-10-CM

## 2025-04-07 DIAGNOSIS — Z51.81 ADMISSION FOR LONG-TERM (CURRENT) USE OF ANTICOAGULANTS: ICD-10-CM

## 2025-04-07 DIAGNOSIS — I48.0 PAROXYSMAL ATRIAL FIBRILLATION (HCC): ICD-10-CM

## 2025-04-07 LAB — INR BLDC: 1.3 (ref 0.9–1.1)

## 2025-04-07 PROCEDURE — 85610 PROTHROMBIN TIME: CPT

## 2025-04-11 ENCOUNTER — HOSPITAL ENCOUNTER (OUTPATIENT)
Dept: LAB | Facility: HOSPITAL | Age: 80
Discharge: HOME | End: 2025-04-11
Attending: INTERNAL MEDICINE
Payer: MEDICARE

## 2025-04-11 ENCOUNTER — HOSPITAL ENCOUNTER (OUTPATIENT)
Dept: LAB | Facility: HOSPITAL | Age: 80
End: 2025-04-11
Attending: INTERNAL MEDICINE
Payer: MEDICARE

## 2025-04-11 DIAGNOSIS — I48.0 PAROXYSMAL ATRIAL FIBRILLATION (HCC): ICD-10-CM

## 2025-04-11 DIAGNOSIS — Z79.01 ADMISSION FOR LONG-TERM (CURRENT) USE OF ANTICOAGULANTS: ICD-10-CM

## 2025-04-11 DIAGNOSIS — Z51.81 ADMISSION FOR LONG-TERM (CURRENT) USE OF ANTICOAGULANTS: ICD-10-CM

## 2025-04-11 LAB — INR BLDC: 1.7 (ref 0.9–1.1)

## 2025-04-11 PROCEDURE — 85610 PROTHROMBIN TIME: CPT

## 2025-04-14 ENCOUNTER — HOSPITAL ENCOUNTER (OUTPATIENT)
Dept: LAB | Facility: HOSPITAL | Age: 80
Discharge: HOME | End: 2025-04-14
Attending: INTERNAL MEDICINE
Payer: MEDICARE

## 2025-04-14 DIAGNOSIS — I48.0 PAROXYSMAL ATRIAL FIBRILLATION (HCC): ICD-10-CM

## 2025-04-14 DIAGNOSIS — Z51.81 ADMISSION FOR LONG-TERM (CURRENT) USE OF ANTICOAGULANTS: ICD-10-CM

## 2025-04-14 DIAGNOSIS — Z79.01 ADMISSION FOR LONG-TERM (CURRENT) USE OF ANTICOAGULANTS: ICD-10-CM

## 2025-04-14 LAB — INR BLDC: 2.4 (ref 0.9–1.1)

## 2025-04-14 PROCEDURE — 85610 PROTHROMBIN TIME: CPT

## 2025-04-21 ENCOUNTER — HOSPITAL ENCOUNTER (OUTPATIENT)
Dept: LAB | Facility: HOSPITAL | Age: 80
Discharge: HOME | End: 2025-04-21
Attending: INTERNAL MEDICINE
Payer: MEDICARE

## 2025-04-21 DIAGNOSIS — I48.0 PAROXYSMAL ATRIAL FIBRILLATION (HCC): ICD-10-CM

## 2025-04-21 DIAGNOSIS — Z51.81 ADMISSION FOR LONG-TERM (CURRENT) USE OF ANTICOAGULANTS: ICD-10-CM

## 2025-04-21 DIAGNOSIS — Z79.01 ADMISSION FOR LONG-TERM (CURRENT) USE OF ANTICOAGULANTS: ICD-10-CM

## 2025-04-21 LAB — INR BLDC: 2.6 (ref 0.9–1.1)

## 2025-04-21 PROCEDURE — 85610 PROTHROMBIN TIME: CPT

## 2025-05-05 ENCOUNTER — HOSPITAL ENCOUNTER (OUTPATIENT)
Dept: LAB | Facility: HOSPITAL | Age: 80
Discharge: HOME | End: 2025-05-05
Attending: INTERNAL MEDICINE
Payer: MEDICARE

## 2025-05-05 DIAGNOSIS — Z51.81 ADMISSION FOR LONG-TERM (CURRENT) USE OF ANTICOAGULANTS: ICD-10-CM

## 2025-05-05 DIAGNOSIS — Z79.01 ADMISSION FOR LONG-TERM (CURRENT) USE OF ANTICOAGULANTS: ICD-10-CM

## 2025-05-05 DIAGNOSIS — I48.0 PAROXYSMAL ATRIAL FIBRILLATION (HCC): ICD-10-CM

## 2025-05-05 LAB
ALBUMIN SERPL-MCNC: 4.4 G/DL (ref 3.2–4.8)
ALBUMIN/GLOB SERPL: 1.5 {RATIO} (ref 1–2)
ALP LIVER SERPL-CCNC: 86 U/L (ref 55–142)
ALT SERPL-CCNC: 24 U/L (ref 10–49)
ANION GAP SERPL CALC-SCNC: 13 MMOL/L (ref 0–18)
AST SERPL-CCNC: 32 U/L (ref ?–34)
BASOPHILS # BLD AUTO: 0.02 X10(3) UL (ref 0–0.2)
BASOPHILS NFR BLD AUTO: 0.5 %
BILIRUB SERPL-MCNC: 0.8 MG/DL (ref 0.2–1.1)
BUN BLD-MCNC: 21 MG/DL (ref 9–23)
CALCIUM BLD-MCNC: 9.4 MG/DL (ref 8.7–10.6)
CHLORIDE SERPL-SCNC: 108 MMOL/L (ref 98–112)
CO2 SERPL-SCNC: 22 MMOL/L (ref 21–32)
CREAT BLD-MCNC: 0.9 MG/DL (ref 0.55–1.02)
EGFRCR SERPLBLD CKD-EPI 2021: 65 ML/MIN/1.73M2 (ref 60–?)
EOSINOPHIL # BLD AUTO: 0.04 X10(3) UL (ref 0–0.7)
EOSINOPHIL NFR BLD AUTO: 1 %
ERYTHROCYTE [DISTWIDTH] IN BLOOD BY AUTOMATED COUNT: 17.1 %
FASTING STATUS PATIENT QL REPORTED: NO
GLOBULIN PLAS-MCNC: 3 G/DL (ref 2–3.5)
GLUCOSE BLD-MCNC: 118 MG/DL (ref 70–99)
HCT VFR BLD AUTO: 35.3 % (ref 35–48)
HGB BLD-MCNC: 11.2 G/DL (ref 12–16)
IMM GRANULOCYTES # BLD AUTO: 0.01 X10(3) UL (ref 0–1)
IMM GRANULOCYTES NFR BLD: 0.3 %
INR BLDC: 3.1 (ref 0.9–1.1)
LYMPHOCYTES # BLD AUTO: 1.73 X10(3) UL (ref 1–4)
LYMPHOCYTES NFR BLD AUTO: 44.1 %
MCH RBC QN AUTO: 25.9 PG (ref 26–34)
MCHC RBC AUTO-ENTMCNC: 31.7 G/DL (ref 31–37)
MCV RBC AUTO: 81.7 FL (ref 80–100)
MONOCYTES # BLD AUTO: 0.83 X10(3) UL (ref 0.1–1)
MONOCYTES NFR BLD AUTO: 21.2 %
NEUTROPHILS # BLD AUTO: 1.29 X10 (3) UL (ref 1.5–7.7)
NEUTROPHILS # BLD AUTO: 1.29 X10(3) UL (ref 1.5–7.7)
NEUTROPHILS NFR BLD AUTO: 32.9 %
OSMOLALITY SERPL CALC.SUM OF ELEC: 300 MOSM/KG (ref 275–295)
PLATELET # BLD AUTO: 114 10(3)UL (ref 150–450)
PLATELETS.RETICULATED NFR BLD AUTO: 4.1 % (ref 0–7)
POTASSIUM SERPL-SCNC: 3.9 MMOL/L (ref 3.5–5.1)
PROT SERPL-MCNC: 7.4 G/DL (ref 5.7–8.2)
RBC # BLD AUTO: 4.32 X10(6)UL (ref 3.8–5.3)
SODIUM SERPL-SCNC: 143 MMOL/L (ref 136–145)
WBC # BLD AUTO: 3.9 X10(3) UL (ref 4–11)

## 2025-05-05 PROCEDURE — 80053 COMPREHEN METABOLIC PANEL: CPT | Performed by: INTERNAL MEDICINE

## 2025-05-05 PROCEDURE — 36415 COLL VENOUS BLD VENIPUNCTURE: CPT | Performed by: INTERNAL MEDICINE

## 2025-05-05 PROCEDURE — 85025 COMPLETE CBC W/AUTO DIFF WBC: CPT | Performed by: INTERNAL MEDICINE

## 2025-05-05 PROCEDURE — 85610 PROTHROMBIN TIME: CPT

## 2025-05-13 ENCOUNTER — HOSPITAL ENCOUNTER (OUTPATIENT)
Dept: LAB | Facility: HOSPITAL | Age: 80
Discharge: HOME | End: 2025-05-13
Attending: INTERNAL MEDICINE
Payer: MEDICARE

## 2025-05-13 DIAGNOSIS — I48.0 PAROXYSMAL ATRIAL FIBRILLATION (HCC): ICD-10-CM

## 2025-05-13 DIAGNOSIS — Z51.81 ADMISSION FOR LONG-TERM (CURRENT) USE OF ANTICOAGULANTS: ICD-10-CM

## 2025-05-13 DIAGNOSIS — Z79.01 ADMISSION FOR LONG-TERM (CURRENT) USE OF ANTICOAGULANTS: ICD-10-CM

## 2025-05-13 LAB — INR BLDC: 2.9 (ref 0.9–1.1)

## 2025-05-13 PROCEDURE — 85610 PROTHROMBIN TIME: CPT

## 2025-05-28 ENCOUNTER — HOSPITAL ENCOUNTER (OUTPATIENT)
Dept: LAB | Facility: HOSPITAL | Age: 80
Discharge: HOME | End: 2025-05-28
Attending: INTERNAL MEDICINE
Payer: MEDICARE

## 2025-05-28 DIAGNOSIS — Z79.01 ADMISSION FOR LONG-TERM (CURRENT) USE OF ANTICOAGULANTS: ICD-10-CM

## 2025-05-28 DIAGNOSIS — I48.0 PAROXYSMAL ATRIAL FIBRILLATION (HCC): ICD-10-CM

## 2025-05-28 DIAGNOSIS — Z51.81 ADMISSION FOR LONG-TERM (CURRENT) USE OF ANTICOAGULANTS: ICD-10-CM

## 2025-05-28 LAB — INR BLDC: 2.8 (ref 0.9–1.1)

## 2025-05-28 PROCEDURE — 85610 PROTHROMBIN TIME: CPT

## 2025-06-23 ENCOUNTER — HOSPITAL ENCOUNTER (OUTPATIENT)
Dept: LAB | Facility: HOSPITAL | Age: 80
Discharge: HOME | End: 2025-06-23
Attending: INTERNAL MEDICINE
Payer: MEDICARE

## 2025-06-23 DIAGNOSIS — Z79.01 LONG TERM (CURRENT) USE OF ANTICOAGULANTS: Primary | ICD-10-CM

## 2025-06-23 DIAGNOSIS — Z79.01 LONG TERM (CURRENT) USE OF ANTICOAGULANTS: ICD-10-CM

## 2025-06-23 LAB — INR BLDC: 2.7 (ref 0.9–1.1)

## 2025-06-23 PROCEDURE — 85610 PROTHROMBIN TIME: CPT

## 2025-07-22 ENCOUNTER — HOSPITAL ENCOUNTER (OUTPATIENT)
Dept: LAB | Facility: HOSPITAL | Age: 80
Discharge: HOME | End: 2025-07-22
Attending: INTERNAL MEDICINE
Payer: MEDICARE

## 2025-07-22 DIAGNOSIS — Z79.01 LONG TERM (CURRENT) USE OF ANTICOAGULANTS: ICD-10-CM

## 2025-07-22 LAB — INR BLDC: 3.3 (ref 0.9–1.1)

## 2025-07-22 PROCEDURE — 85610 PROTHROMBIN TIME: CPT

## 2025-07-29 ENCOUNTER — HOSPITAL ENCOUNTER (OUTPATIENT)
Dept: LAB | Facility: HOSPITAL | Age: 80
Discharge: HOME | End: 2025-07-29
Attending: INTERNAL MEDICINE

## 2025-07-29 DIAGNOSIS — Z79.01 LONG TERM (CURRENT) USE OF ANTICOAGULANTS: ICD-10-CM

## 2025-07-29 LAB — INR BLDC: 3.2 (ref 0.9–1.1)

## 2025-07-29 PROCEDURE — 85610 PROTHROMBIN TIME: CPT

## 2025-08-05 ENCOUNTER — HOSPITAL ENCOUNTER (OUTPATIENT)
Dept: LAB | Facility: HOSPITAL | Age: 80
Discharge: HOME | End: 2025-08-05
Attending: INTERNAL MEDICINE

## 2025-08-05 DIAGNOSIS — Z79.01 LONG TERM (CURRENT) USE OF ANTICOAGULANTS: ICD-10-CM

## 2025-08-05 LAB — INR BLDC: 3 (ref 0.9–1.1)

## 2025-08-05 PROCEDURE — 85610 PROTHROMBIN TIME: CPT

## 2025-08-12 ENCOUNTER — HOSPITAL ENCOUNTER (OUTPATIENT)
Dept: LAB | Facility: HOSPITAL | Age: 80
Discharge: HOME | End: 2025-08-12
Attending: INTERNAL MEDICINE

## 2025-08-12 DIAGNOSIS — Z79.01 LONG TERM (CURRENT) USE OF ANTICOAGULANTS: ICD-10-CM

## 2025-08-12 LAB — INR BLDC: 2.1 (ref 0.9–1.1)

## 2025-08-12 PROCEDURE — 85610 PROTHROMBIN TIME: CPT

## 2025-08-13 ENCOUNTER — HOSPITAL ENCOUNTER (OUTPATIENT)
Dept: LAB | Facility: HOSPITAL | Age: 80
Discharge: HOME | End: 2025-08-13
Attending: INTERNAL MEDICINE

## 2025-08-13 LAB
ANION GAP SERPL CALC-SCNC: 14 MMOL/L (ref 0–18)
BASOPHILS # BLD AUTO: 0.02 X10(3) UL (ref 0–0.2)
BASOPHILS NFR BLD AUTO: 0.4 %
BUN BLD-MCNC: 22 MG/DL (ref 9–23)
CALCIUM BLD-MCNC: 9.5 MG/DL (ref 8.7–10.6)
CHLORIDE SERPL-SCNC: 108 MMOL/L (ref 98–112)
CO2 SERPL-SCNC: 21 MMOL/L (ref 21–32)
CREAT BLD-MCNC: 0.98 MG/DL (ref 0.55–1.02)
EGFRCR SERPLBLD CKD-EPI 2021: 59 ML/MIN/1.73M2 (ref 60–?)
EOSINOPHIL # BLD AUTO: 0.04 X10(3) UL (ref 0–0.7)
EOSINOPHIL NFR BLD AUTO: 0.8 %
ERYTHROCYTE [DISTWIDTH] IN BLOOD BY AUTOMATED COUNT: 16.5 %
FASTING STATUS PATIENT QL REPORTED: NO
GLUCOSE BLD-MCNC: 99 MG/DL (ref 70–99)
HCT VFR BLD AUTO: 35.7 % (ref 35–48)
HGB BLD-MCNC: 11.1 G/DL (ref 12–16)
IMM GRANULOCYTES # BLD AUTO: 0.02 X10(3) UL (ref 0–1)
IMM GRANULOCYTES NFR BLD: 0.4 %
LYMPHOCYTES # BLD AUTO: 1.97 X10(3) UL (ref 1–4)
LYMPHOCYTES NFR BLD AUTO: 38.5 %
MCH RBC QN AUTO: 24.1 PG (ref 26–34)
MCHC RBC AUTO-ENTMCNC: 31.1 G/DL (ref 31–37)
MCV RBC AUTO: 77.4 FL (ref 80–100)
MONOCYTES # BLD AUTO: 1.27 X10(3) UL (ref 0.1–1)
MONOCYTES NFR BLD AUTO: 24.8 %
NEUTROPHILS # BLD AUTO: 1.8 X10 (3) UL (ref 1.5–7.7)
NEUTROPHILS # BLD AUTO: 1.8 X10(3) UL (ref 1.5–7.7)
NEUTROPHILS NFR BLD AUTO: 35.1 %
OSMOLALITY SERPL CALC.SUM OF ELEC: 299 MOSM/KG (ref 275–295)
PLATELET # BLD AUTO: 160 10(3)UL (ref 150–450)
POTASSIUM SERPL-SCNC: 4.3 MMOL/L (ref 3.5–5.1)
RBC # BLD AUTO: 4.61 X10(6)UL (ref 3.8–5.3)
SODIUM SERPL-SCNC: 143 MMOL/L (ref 136–145)
WBC # BLD AUTO: 5.1 X10(3) UL (ref 4–11)

## 2025-08-13 PROCEDURE — 36415 COLL VENOUS BLD VENIPUNCTURE: CPT | Performed by: INTERNAL MEDICINE

## 2025-08-13 PROCEDURE — 80048 BASIC METABOLIC PNL TOTAL CA: CPT | Performed by: INTERNAL MEDICINE

## 2025-08-13 PROCEDURE — 85025 COMPLETE CBC W/AUTO DIFF WBC: CPT | Performed by: INTERNAL MEDICINE

## 2025-08-19 ENCOUNTER — HOSPITAL ENCOUNTER (OUTPATIENT)
Dept: LAB | Facility: HOSPITAL | Age: 80
Discharge: HOME | End: 2025-08-19
Attending: INTERNAL MEDICINE

## 2025-08-19 DIAGNOSIS — Z79.01 LONG TERM (CURRENT) USE OF ANTICOAGULANTS: ICD-10-CM

## 2025-08-19 LAB — INR BLDC: 2.3 (ref 0.9–1.1)

## 2025-08-19 PROCEDURE — 85610 PROTHROMBIN TIME: CPT

## 2025-08-29 ENCOUNTER — HOSPITAL ENCOUNTER (OUTPATIENT)
Dept: INTERVENTIONAL RADIOLOGY/VASCULAR | Facility: HOSPITAL | Age: 80
Discharge: HOME | End: 2025-08-29
Attending: INTERNAL MEDICINE | Admitting: INTERNAL MEDICINE

## 2025-08-29 ENCOUNTER — HOSPITAL ENCOUNTER (OUTPATIENT)
Dept: CV DIAGNOSTICS | Facility: HOSPITAL | Age: 80
Discharge: HOME | End: 2025-08-29
Attending: INTERNAL MEDICINE

## 2025-08-29 VITALS
SYSTOLIC BLOOD PRESSURE: 129 MMHG | TEMPERATURE: 98 F | HEIGHT: 62 IN | RESPIRATION RATE: 20 BRPM | HEART RATE: 59 BPM | WEIGHT: 109 LBS | BODY MASS INDEX: 20.06 KG/M2 | OXYGEN SATURATION: 92 % | DIASTOLIC BLOOD PRESSURE: 65 MMHG

## 2025-08-29 DIAGNOSIS — I34.0 MITRAL INSUFFICIENCY: ICD-10-CM

## 2025-08-29 PROBLEM — Z95.0 PACEMAKER: Chronic | Status: ACTIVE | Noted: 2025-08-29

## 2025-08-29 PROCEDURE — 93320 DOPPLER ECHO COMPLETE: CPT | Performed by: INTERNAL MEDICINE

## 2025-08-29 PROCEDURE — 93312 ECHO TRANSESOPHAGEAL: CPT | Performed by: INTERNAL MEDICINE

## 2025-08-29 PROCEDURE — 99153 MOD SED SAME PHYS/QHP EA: CPT | Performed by: INTERNAL MEDICINE

## 2025-08-29 PROCEDURE — 93325 DOPPLER ECHO COLOR FLOW MAPG: CPT | Performed by: INTERNAL MEDICINE

## 2025-08-29 PROCEDURE — 99152 MOD SED SAME PHYS/QHP 5/>YRS: CPT | Performed by: INTERNAL MEDICINE

## 2025-08-29 RX ORDER — MIDAZOLAM HYDROCHLORIDE 1 MG/ML
INJECTION INTRAMUSCULAR; INTRAVENOUS
Status: COMPLETED
Start: 2025-08-29 | End: 2025-08-29

## 2025-08-29 RX ORDER — SODIUM CHLORIDE 9 MG/ML
INJECTION, SOLUTION INTRAVENOUS
Status: DISCONTINUED | OUTPATIENT
Start: 2025-08-30 | End: 2025-08-29

## (undated) NOTE — ED AVS SNAPSHOT
[facility] Emergency Department    [place] [address]    Phone:  [phone]    Fax:  [phone]           Mrs. Gricelda Mccabe   MRN: [id number]    Department:  [facility] Emergency Department   Date of Visit: IF THERE IS ANY CHANGE OR WORSENING OF YOUR CONDITION, CALL YOUR PRIMARY CARE PHYSICIAN AT ONCE OR RETURN IMMEDIATELY TO THE EMERGENCY DEPARTMENT.     If you have been prescribed any medication(s), please fill your prescription right away and begin taking t

## (undated) NOTE — ED AVS SNAPSHOT
[facility] Emergency Department    [place] [address]    Phone:  [phone]    Fax:  [phone]           Mrs. Dee Arias   MRN: [id number]    Department:  [facility] Emergency Department   Date of Visit: You have not been prescribed any medications. Discharge Instructions       If you start bleeding again, hold direct pressure with a nasal clip.   Return immediately if you develop any worrisome symptoms such as persistent bleeding does not res primary care or specialist physician will see patients referred from the [facility] Emergency Department. Follow-up care is at the discretion of that Physician.     IF THERE IS ANY CHANGE OR WORSENING OF YOUR CONDITION, CALL YOUR PRIMARY CARE PHYSICIAN harming yourself, contact [facility] at [phone]. - If you don’t have insurance, [facility] has partnered with [facility] [address] to help you get signed up for insurance coverage.   [facility] [place]

## (undated) NOTE — MR AVS SNAPSHOT
After Visit Summary   4/26/2017    Maria C Peters    MRN: [id number]           Diagnoses this Visit     Malignant neoplasm of left female breast, unspecified site of breast [facility])    -  Primary     Osteopenia, unspecified location           Allergies [address] [facility] [facility] [address]       Wednesday December 27, 2017 11:00 AM     Appointment with Silvia Carlos at [address] [place] [address] ([phone])   [address] [place] [id number]

## (undated) NOTE — ED AVS SNAPSHOT
Mrs. Aydee Castellon   MRN: [id number]    Department:  [facility] Emergency Department   Date of Visit:  8/27/2019           Disclosure     Insurance plans vary and the physician(s) referred by the ER may not be covered by your plan.  Please contact tell this physician (or your personal doctor if your instructions are to return to your personal doctor) about any new or lasting problems. The primary care or specialist physician will see patients referred from the [facility] Emergency Department.  [place]